# Patient Record
Sex: MALE | Race: WHITE | NOT HISPANIC OR LATINO
[De-identification: names, ages, dates, MRNs, and addresses within clinical notes are randomized per-mention and may not be internally consistent; named-entity substitution may affect disease eponyms.]

---

## 2017-04-03 PROBLEM — Z00.00 ENCOUNTER FOR PREVENTIVE HEALTH EXAMINATION: Status: ACTIVE | Noted: 2017-04-03

## 2022-02-09 ENCOUNTER — APPOINTMENT (OUTPATIENT)
Dept: COLORECTAL SURGERY | Facility: CLINIC | Age: 57
End: 2022-02-09
Payer: COMMERCIAL

## 2022-02-09 VITALS
HEART RATE: 58 BPM | WEIGHT: 240 LBS | BODY MASS INDEX: 33.6 KG/M2 | DIASTOLIC BLOOD PRESSURE: 80 MMHG | HEIGHT: 71 IN | TEMPERATURE: 98.1 F | SYSTOLIC BLOOD PRESSURE: 150 MMHG

## 2022-02-09 DIAGNOSIS — Z83.3 FAMILY HISTORY OF DIABETES MELLITUS: ICD-10-CM

## 2022-02-09 DIAGNOSIS — U07.1 COVID-19: ICD-10-CM

## 2022-02-09 DIAGNOSIS — Z72.89 OTHER PROBLEMS RELATED TO LIFESTYLE: ICD-10-CM

## 2022-02-09 DIAGNOSIS — Z87.891 PERSONAL HISTORY OF NICOTINE DEPENDENCE: ICD-10-CM

## 2022-02-09 DIAGNOSIS — Z86.79 PERSONAL HISTORY OF OTHER DISEASES OF THE CIRCULATORY SYSTEM: ICD-10-CM

## 2022-02-09 DIAGNOSIS — R73.03 PREDIABETES.: ICD-10-CM

## 2022-02-09 PROCEDURE — 99244 OFF/OP CNSLTJ NEW/EST MOD 40: CPT

## 2022-02-10 NOTE — PHYSICAL EXAM
[Normal Breath Sounds] : Normal breath sounds [Normal Heart Sounds] : normal heart sounds [Normal Rate and Rhythm] : normal rate and rhythm [No Edema] : No edema [Alert] : alert [Oriented to Person] : oriented to person [Oriented to Place] : oriented to place [Oriented to Time] : oriented to time [Anxious] : anxious [de-identified] : protruberant large ventral hernia Lt sided ostomy [de-identified] : NC/AT [de-identified] : +ROM [de-identified] : intact

## 2022-02-10 NOTE — REVIEW OF SYSTEMS
[As Noted in HPI] : as noted in HPI [Negative] : Endocrine [Chest Pain] : no chest pain [Shortness Of Breath] : no shortness of breath [Easy Bleeding] : no tendency for easy bleeding [Easy Bruising] : no tendency for easy bruising [FreeTextEntry8] : recent testicular sono

## 2022-02-10 NOTE — HISTORY OF PRESENT ILLNESS
[FreeTextEntry1] : 55yo WM with COVID in August 2021. Sept 6 went to [facility] ER with excruciating abdominal yana/distention.  CT scan revealed perforated colon. Underwent urgent Dev procedure. Hospitalized x1 weeks. Discharged with PICC line for IV ABX. Two days later VNS sent pt to clinic. Wound opened. Returned to [facility] x1 week. IV ABX. Wound VAC x2-3weeks.\par \par Presently ostomy functioning. Denies abdominal pain. Large ventral hernia. Presents for consultation re reversal of ostomy. Last colonoscopy 2015 (?diverticulosis)

## 2022-02-10 NOTE — ASSESSMENT
[FreeTextEntry1] : 56-year-old male who presents for consultation regarding Dev reversal.\par \par Patient had Covid in August of 2021. In early September of this same year he developed excruciating abdominal pain prompting evaluation at the emergency room of [facility]. CAT scan revealed perforated diverticulitis. He underwent an emergent Dev procedure. He was discharged home with a PICC line for an extended course of antibiotic. He was seen by a visiting nurse and there were concerns regarding his wound. He was seen at urgent care where the wound was opened. He eventually was readmitted to the hospital for additional antibiotics and eventual VAC placement. Slowly his wound healed. A large ventral hernia has developed. The patient has not had a colonoscopy in many years.\par \par We certainly can proceed with scheduling of the reversal of Dev. He will require a preoperative colonoscopy via the ostomy and anus. I will also have him seen by general surgery preoperatively so that we can combine the hernia repair with biomesh with the reversal of the Dev. We talked about anticipated operative time, hospital stay and time to complete recuperation. Risks, alternatives and benefits including but not limited to anastomotic leak, wound infection and deep venous thrombosis were discussed.\par \par Questions were answered and he wishes to proceed with the plan outlined above.

## 2022-02-17 ENCOUNTER — APPOINTMENT (OUTPATIENT)
Dept: SURGERY | Facility: CLINIC | Age: 57
End: 2022-02-17
Payer: COMMERCIAL

## 2022-02-17 VITALS
WEIGHT: 240 LBS | HEART RATE: 91 BPM | DIASTOLIC BLOOD PRESSURE: 87 MMHG | SYSTOLIC BLOOD PRESSURE: 142 MMHG | BODY MASS INDEX: 33.6 KG/M2 | HEIGHT: 71 IN | RESPIRATION RATE: 17 BRPM | OXYGEN SATURATION: 98 % | TEMPERATURE: 97.8 F

## 2022-02-17 DIAGNOSIS — Z78.9 OTHER SPECIFIED HEALTH STATUS: ICD-10-CM

## 2022-02-17 PROCEDURE — 99204 OFFICE O/P NEW MOD 45 MIN: CPT

## 2022-02-17 RX ORDER — METRONIDAZOLE 500 MG/1
500 TABLET ORAL
Qty: 3 | Refills: 0 | Status: DISCONTINUED | COMMUNITY
Start: 2022-02-09 | End: 2022-02-17

## 2022-02-17 RX ORDER — NEOMYCIN SULFATE 500 MG/1
500 TABLET ORAL
Qty: 3 | Refills: 0 | Status: DISCONTINUED | COMMUNITY
Start: 2022-02-09 | End: 2022-02-17

## 2022-02-20 ENCOUNTER — OUTPATIENT (OUTPATIENT)
Dept: OUTPATIENT SERVICES | Facility: HOSPITAL | Age: 57
LOS: 1 days | End: 2022-02-20
Payer: COMMERCIAL

## 2022-02-20 DIAGNOSIS — Z11.52 ENCOUNTER FOR SCREENING FOR COVID-19: ICD-10-CM

## 2022-02-20 LAB — SARS-COV-2 RNA SPEC QL NAA+PROBE: SIGNIFICANT CHANGE UP

## 2022-02-20 PROCEDURE — C9803: CPT

## 2022-02-20 PROCEDURE — U0005: CPT

## 2022-02-20 PROCEDURE — U0003: CPT

## 2022-02-22 ENCOUNTER — RESULT REVIEW (OUTPATIENT)
Age: 57
End: 2022-02-22

## 2022-02-22 ENCOUNTER — OUTPATIENT (OUTPATIENT)
Dept: OUTPATIENT SERVICES | Facility: HOSPITAL | Age: 57
LOS: 1 days | End: 2022-02-22
Payer: COMMERCIAL

## 2022-02-22 ENCOUNTER — APPOINTMENT (OUTPATIENT)
Dept: ULTRASOUND IMAGING | Facility: HOSPITAL | Age: 57
End: 2022-02-22
Payer: COMMERCIAL

## 2022-02-22 DIAGNOSIS — Z00.00 ENCOUNTER FOR GENERAL ADULT MEDICAL EXAMINATION WITHOUT ABNORMAL FINDINGS: ICD-10-CM

## 2022-02-22 PROCEDURE — 64647 CHEMODENERV TRUNK MUSC 6/>: CPT

## 2022-02-22 PROCEDURE — 76942 ECHO GUIDE FOR BIOPSY: CPT | Mod: 26

## 2022-02-22 PROCEDURE — 76942 ECHO GUIDE FOR BIOPSY: CPT

## 2022-02-22 PROCEDURE — 64646 CHEMODENERV TRUNK MUSC 1-5: CPT

## 2022-02-25 ENCOUNTER — OUTPATIENT (OUTPATIENT)
Dept: OUTPATIENT SERVICES | Facility: HOSPITAL | Age: 57
LOS: 1 days | End: 2022-02-25
Payer: COMMERCIAL

## 2022-02-25 VITALS
WEIGHT: 240.97 LBS | TEMPERATURE: 98 F | HEART RATE: 92 BPM | SYSTOLIC BLOOD PRESSURE: 130 MMHG | DIASTOLIC BLOOD PRESSURE: 85 MMHG | RESPIRATION RATE: 18 BRPM | HEIGHT: 71 IN | OXYGEN SATURATION: 96 %

## 2022-02-25 DIAGNOSIS — Z98.890 OTHER SPECIFIED POSTPROCEDURAL STATES: Chronic | ICD-10-CM

## 2022-02-25 DIAGNOSIS — G47.33 OBSTRUCTIVE SLEEP APNEA (ADULT) (PEDIATRIC): ICD-10-CM

## 2022-02-25 DIAGNOSIS — K57.33 DIVERTICULITIS OF LARGE INTESTINE WITHOUT PERFORATION OR ABSCESS WITH BLEEDING: ICD-10-CM

## 2022-02-25 DIAGNOSIS — Z29.9 ENCOUNTER FOR PROPHYLACTIC MEASURES, UNSPECIFIED: ICD-10-CM

## 2022-02-25 DIAGNOSIS — K43.2 INCISIONAL HERNIA WITHOUT OBSTRUCTION OR GANGRENE: ICD-10-CM

## 2022-02-25 DIAGNOSIS — K57.32 DIVERTICULITIS OF LARGE INTESTINE WITHOUT PERFORATION OR ABSCESS WITHOUT BLEEDING: ICD-10-CM

## 2022-02-25 DIAGNOSIS — Z01.818 ENCOUNTER FOR OTHER PREPROCEDURAL EXAMINATION: ICD-10-CM

## 2022-02-25 LAB
ANION GAP SERPL CALC-SCNC: 14 MMOL/L — SIGNIFICANT CHANGE UP (ref 5–17)
BLD GP AB SCN SERPL QL: NEGATIVE — SIGNIFICANT CHANGE UP
BUN SERPL-MCNC: 20 MG/DL — SIGNIFICANT CHANGE UP (ref 7–23)
CALCIUM SERPL-MCNC: 9.7 MG/DL — SIGNIFICANT CHANGE UP (ref 8.4–10.5)
CHLORIDE SERPL-SCNC: 100 MMOL/L — SIGNIFICANT CHANGE UP (ref 96–108)
CO2 SERPL-SCNC: 23 MMOL/L — SIGNIFICANT CHANGE UP (ref 22–31)
CREAT SERPL-MCNC: 0.71 MG/DL — SIGNIFICANT CHANGE UP (ref 0.5–1.3)
GLUCOSE SERPL-MCNC: 112 MG/DL — HIGH (ref 70–99)
HCT VFR BLD CALC: 44.3 % — SIGNIFICANT CHANGE UP (ref 39–50)
HGB BLD-MCNC: 14.5 G/DL — SIGNIFICANT CHANGE UP (ref 13–17)
MCHC RBC-ENTMCNC: 29.3 PG — SIGNIFICANT CHANGE UP (ref 27–34)
MCHC RBC-ENTMCNC: 32.7 GM/DL — SIGNIFICANT CHANGE UP (ref 32–36)
MCV RBC AUTO: 89.5 FL — SIGNIFICANT CHANGE UP (ref 80–100)
NRBC # BLD: 0 /100 WBCS — SIGNIFICANT CHANGE UP (ref 0–0)
PLATELET # BLD AUTO: 279 K/UL — SIGNIFICANT CHANGE UP (ref 150–400)
POTASSIUM SERPL-MCNC: 4.5 MMOL/L — SIGNIFICANT CHANGE UP (ref 3.5–5.3)
POTASSIUM SERPL-SCNC: 4.5 MMOL/L — SIGNIFICANT CHANGE UP (ref 3.5–5.3)
RBC # BLD: 4.95 M/UL — SIGNIFICANT CHANGE UP (ref 4.2–5.8)
RBC # FLD: 13.3 % — SIGNIFICANT CHANGE UP (ref 10.3–14.5)
RH IG SCN BLD-IMP: POSITIVE — SIGNIFICANT CHANGE UP
SODIUM SERPL-SCNC: 137 MMOL/L — SIGNIFICANT CHANGE UP (ref 135–145)
WBC # BLD: 6.64 K/UL — SIGNIFICANT CHANGE UP (ref 3.8–10.5)
WBC # FLD AUTO: 6.64 K/UL — SIGNIFICANT CHANGE UP (ref 3.8–10.5)

## 2022-02-25 PROCEDURE — 86900 BLOOD TYPING SEROLOGIC ABO: CPT

## 2022-02-25 PROCEDURE — 86901 BLOOD TYPING SEROLOGIC RH(D): CPT

## 2022-02-25 PROCEDURE — 85027 COMPLETE CBC AUTOMATED: CPT

## 2022-02-25 PROCEDURE — 87640 STAPH A DNA AMP PROBE: CPT

## 2022-02-25 PROCEDURE — 86850 RBC ANTIBODY SCREEN: CPT

## 2022-02-25 PROCEDURE — 87086 URINE CULTURE/COLONY COUNT: CPT

## 2022-02-25 PROCEDURE — 80048 BASIC METABOLIC PNL TOTAL CA: CPT

## 2022-02-25 PROCEDURE — G0463: CPT

## 2022-02-25 PROCEDURE — 83036 HEMOGLOBIN GLYCOSYLATED A1C: CPT

## 2022-02-25 PROCEDURE — 87641 MR-STAPH DNA AMP PROBE: CPT

## 2022-02-25 RX ORDER — CEFOTETAN DISODIUM 1 G
2 VIAL (EA) INJECTION ONCE
Refills: 0 | Status: DISCONTINUED | OUTPATIENT
Start: 2022-03-15 | End: 2022-03-16

## 2022-02-25 NOTE — H&P PST ADULT - NSICDXPASTMEDICALHX_GEN_ALL_CORE_FT
PAST MEDICAL HISTORY:  Abdominal wall hernia     Colostomy present     HTN (hypertension)     Obesity BMI-33    Perforated bowel

## 2022-02-25 NOTE — H&P PST ADULT - PROBLEM SELECTOR PLAN 1
ERP, Reversal Of Deleon Procedure ,Incisional Hernia Repair on 3/15/22  Pre- Op Instructions discussed   Labs sent  covid test 2  urine culture sent   medical eval due to- currently on sinus infection sinus infection

## 2022-02-25 NOTE — H&P PST ADULT - HISTORY OF PRESENT ILLNESS
56 year old male with PMH of HTN , Covid in August of 2021, Status post Dev procedure for perforated diverticulitis in 9/21@ [facility] / discharged home with an extended course of antibiotic and required wound VAC placement which was removed . Pt noted with abdominal bulge, c/o abdominal pain/discomfort noted with abdominal wall hernia . Pt was seen by Dr. Chi Campos and Dr Wagner -  for Dev reversal combined with incisional hernia repair on 3/15/22.    Covid test 3/12/22  Pt will stop phentermine 7 days prior to surgery     pt current on ABT and prednisone for ? sinus infection - requested medical eval prior to surgery

## 2022-02-25 NOTE — H&P PST ADULT - GASTROINTESTINAL COMMENTS
abdominal wall hernia abdominal surgical  wound / abdominal area reddened/ colostomy / pt wearing abdominal binder

## 2022-02-25 NOTE — H&P PST ADULT - NSANTHOSAYNRD_GEN_A_CORE
No. LUCITA screening performed.  STOP BANG Legend: 0-2 = LOW Risk; 3-4 = INTERMEDIATE Risk; 5-8 = HIGH Risk

## 2022-02-25 NOTE — H&P PST ADULT - ASSESSMENT
CAPRINI SCORE [CLOT updated 18]    AGE RELATED RISK FACTORS                                                       MOBILITY RELATED FACTORS  [x ] Age 41-60 years                                            (1 Point)                    [ ] Bed rest                                                        (1 Point)  [ ] Age: 61-74 years                                           (2 Points)                  [ ] Plaster cast                                                   (2 Points)  [ ] Age= 75 years                                              (3 Points)                    [ ] Bed bound for more than 72 hours                 (2 Points)    DISEASE RELATED RISK FACTORS                                               GENDER SPECIFIC FACTORS  [ ] Edema in the lower extremities                       (1 Point)              [ ] Pregnancy                                                     (1 Point)  [ ] Varicose veins                                               (1 Point)                     [ ] Post-partum < 6 weeks                                   (1 Point)             [x ] BMI > 25 Kg/m2                                            (1 Point)                     [ ] Hormonal therapy  or oral contraception          (1 Point)                 [ ] Sepsis (in the previous month)                        (1 Point)               [ ] History of pregnancy complications                 (1 point)  [ ] Pneumonia or serious lung disease                                               [ ] Unexplained or recurrent                     (1 Point)           (in the previous month)                               (1 Point)  [ ] Abnormal pulmonary function test                     (1 Point)                 SURGERY RELATED RISK FACTORS  [ ] Acute myocardial infarction                              (1 Point)               [ ]  Section                                             (1 Point)  [ ] Congestive heart failure (in the previous month)  (1 Point)      [ ] Minor surgery                                                  (1 Point)   [ ] Inflammatory bowel disease                             (1 Point)               [ ] Arthroscopic surgery                                        (2 Points)  [ ] Central venous access                                      (2 Points)                [x ] General surgery lasting more than 45 minutes (2 points)  [ ] Present or previous malignancy                     (2 Points)                [ ] Elective arthroplasty                                         (5 points)    [ ] Stroke (in the previous month)                          (5 Points)                                                                                                                                                           HEMATOLOGY RELATED FACTORS                                                 TRAUMA RELATED RISK FACTORS  [ ] Prior episodes of VTE                                     (3 Points)                [ ] Fracture of the hip, pelvis, or leg                       (5 Points)  [ ] Positive family history for VTE                         (3 Points)             [ ] Acute spinal cord injury (in the previous month)  (5 Points)  [ ] Prothrombin 60990 A                                     (3 Points)               [ ] Paralysis  (less than 1 month)                             (5 Points)  [ ] Factor V Leiden                                             (3 Points)                  [ ] Multiple Trauma within 1 month                        (5 Points)  [ ] Lupus anticoagulants                                     (3 Points)                                                           [ ] Anticardiolipin antibodies                               (3 Points)                                                       [ ] High homocysteine in the blood                      (3 Points)                                             [ ] Other congenital or acquired thrombophilia      (3 Points)                                                [ ] Heparin induced thrombocytopenia                  (3 Points)                                     Total Score [     4     ]

## 2022-02-26 LAB
A1C WITH ESTIMATED AVERAGE GLUCOSE RESULT: 7.5 % — HIGH (ref 4–5.6)
CULTURE RESULTS: SIGNIFICANT CHANGE UP
ESTIMATED AVERAGE GLUCOSE: 169 MG/DL — HIGH (ref 68–114)
MRSA PCR RESULT.: SIGNIFICANT CHANGE UP
S AUREUS DNA NOSE QL NAA+PROBE: DETECTED
SPECIMEN SOURCE: SIGNIFICANT CHANGE UP

## 2022-02-28 DIAGNOSIS — Z22.321 CARRIER OR SUSPECTED CARRIER OF METHICILLIN SUSCEPTIBLE STAPHYLOCOCCUS AUREUS: ICD-10-CM

## 2022-03-01 DIAGNOSIS — Z12.11 ENCOUNTER FOR SCREENING FOR MALIGNANT NEOPLASM OF COLON: ICD-10-CM

## 2022-03-02 NOTE — HISTORY OF PRESENT ILLNESS
[de-identified] : Ramesh is a 56 year old male here for consultation of abdominal bulge. Pt seen by Dr. Chi Campos 2/9/22- Status post Dev for perforated diverticulitis now ready for Dev reversal combined with incisional hernia repair. Patient had Covid in August of 2021. In early September of this same year he developed excruciating abdominal pain prompting evaluation at the emergency room of [facility]. CAT scan revealed perforated diverticulitis. He underwent an emergent Dev procedure. He was discharged home with a PICC line for an extended course of antibiotic. He was seen by a visiting nurse and there were concerns regarding his wound. He was seen at urgent care where the wound was opened. He eventually was readmitted to the hospital for additional antibiotics and eventual VAC placement. Slowly his wound healed. A large ventral hernia has developed. The patient has not had a colonoscopy in many years.  Today pt reports abdominal discomfort. Pt take Oxycodone for abdominal pain, drainage from abdominal wounds which he changes daily, mid abdominal bulge, wears abdominal binder. Reports ostomy functioning well, daily output. Good appetite, no nausea, no vomiting. Denies fever and chills.  The patient is scheduled for a Dev reversal.  At the time of the Dev reversal I have been asked to fix the incisional hernia.

## 2022-03-02 NOTE — CONSULT LETTER
[Dear  ___] : Dear  [unfilled], [Consult Letter:] : I had the pleasure of evaluating your patient, [unfilled]. [Please see my note below.] : Please see my note below. [Consult Closing:] : Thank you very much for allowing me to participate in the care of this patient.  If you have any questions, please do not hesitate to contact me. [Sincerely,] : Sincerely, [FreeTextEntry3] : I have reviewed all the documentation for this encounter with the patient and have edited where appropriate\par \par Dr. Mann Wagner

## 2022-03-02 NOTE — ASSESSMENT
[FreeTextEntry1] : I had a long and detailed discussion with the patient about the repair of this incisional hernia at the time of the Dev reversal which will be done by Dr. Chi Campos.  I have told the patient that I need to review his CT images and have asked him to get me a disc with those images that were done recently.\par \par I discussed with the patient the abdominal wall reconstruction that would be needed to fix this large incisional hernia.  I have told him that to give us the best chance of being able to fix this hernia without having to do any bridging will require him to have Botox injected 50 units in each muscle layer bilaterally in his abdominal wall.  As the patient is already set up for March 15 for his Dev reversal I am trying to expedite the Botox injection this week.\par \par I will discuss all this with Dr. Campos no once I see the CT images.

## 2022-03-02 NOTE — PHYSICAL EXAM
[Normal Breath Sounds] : Normal breath sounds [Normal Heart Sounds] : normal heart sounds [No Rash or Lesion] : No rash or lesion [Alert] : alert [Oriented to Person] : oriented to person [Oriented to Place] : oriented to place [Oriented to Time] : oriented to time [Calm] : calm [No HSM] : no hepatosplenomegaly [JVD] : no jugular venous distention  [Abdominal Masses] : No abdominal masses [Abdomen Tenderness] : ~T ~M No abdominal tenderness [de-identified] : WNL- cranial nerves 2-12 intact [de-identified] : WNL [de-identified] : There is a large incisional hernia with some pressure necrosis of the overlying skin. [de-identified] : ROM WNL

## 2022-03-07 ENCOUNTER — APPOINTMENT (OUTPATIENT)
Dept: UROLOGY | Facility: CLINIC | Age: 57
End: 2022-03-07
Payer: COMMERCIAL

## 2022-03-07 DIAGNOSIS — K63.1 PERFORATION OF INTESTINE (NONTRAUMATIC): ICD-10-CM

## 2022-03-07 PROBLEM — K43.9 VENTRAL HERNIA WITHOUT OBSTRUCTION OR GANGRENE: Chronic | Status: ACTIVE | Noted: 2022-02-25

## 2022-03-07 PROBLEM — I10 ESSENTIAL (PRIMARY) HYPERTENSION: Chronic | Status: ACTIVE | Noted: 2022-02-25

## 2022-03-07 PROCEDURE — 99442: CPT

## 2022-03-07 NOTE — HISTORY OF PRESENT ILLNESS
[FreeTextEntry1] : Will plan for cystoscopy ureteral catheters insertion bilaterally.\par \par We discussed ureteral catheter placement at the time of upcoming colorectal surgery. I explained the nature of the operation with catheters be placed cystoscopically. I have explained that this is a relatively short duration of procedure done just prior to the colon resection and that there may be associated gross hematuria after the procedure a temporary basis. Ureteral injury or edema or perforation of renal pelvis or calyces can occur, acute kidney injury can occur as well. I've also explained that catheters are not always able to be placed successfully. We also discussed the purpose of the catheters being to help assist with identification of ureters during the operation or to assist in identification of ureteral injury that may occur during the time of such an operation. If ureteral injury were to occur, I have explained that there were procedures to repair such injury that would be undertaken at that time, assuming that the injury was recognized immediately. Also, any encountered and recognized bladder injury would be repaired at the time. There may be a need for longer duration ureteral stenting if ureteral injury was noted and the extent of the injury suggested a need for ureteral stenting.\par

## 2022-03-12 ENCOUNTER — OUTPATIENT (OUTPATIENT)
Dept: OUTPATIENT SERVICES | Facility: HOSPITAL | Age: 57
LOS: 1 days | End: 2022-03-12
Payer: COMMERCIAL

## 2022-03-12 DIAGNOSIS — Z98.890 OTHER SPECIFIED POSTPROCEDURAL STATES: Chronic | ICD-10-CM

## 2022-03-12 DIAGNOSIS — Z11.52 ENCOUNTER FOR SCREENING FOR COVID-19: ICD-10-CM

## 2022-03-12 LAB — SARS-COV-2 RNA SPEC QL NAA+PROBE: SIGNIFICANT CHANGE UP

## 2022-03-12 PROCEDURE — U0005: CPT

## 2022-03-12 PROCEDURE — C9803: CPT

## 2022-03-12 PROCEDURE — U0003: CPT

## 2022-03-14 ENCOUNTER — APPOINTMENT (OUTPATIENT)
Dept: COLORECTAL SURGERY | Facility: CLINIC | Age: 57
End: 2022-03-14
Payer: COMMERCIAL

## 2022-03-14 ENCOUNTER — TRANSCRIPTION ENCOUNTER (OUTPATIENT)
Age: 57
End: 2022-03-14

## 2022-03-14 DIAGNOSIS — K57.30 DIVERTICULOSIS OF LARGE INTESTINE W/OUT PERFORATION OR ABSCESS W/OUT BLEEDING: ICD-10-CM

## 2022-03-14 PROCEDURE — 44388 COLONOSCOPY THRU STOMA SPX: CPT

## 2022-03-15 ENCOUNTER — INPATIENT (INPATIENT)
Facility: HOSPITAL | Age: 57
LOS: 7 days | Discharge: HOME | DRG: 329 | End: 2022-03-23
Attending: SURGERY | Admitting: SURGERY
Payer: COMMERCIAL

## 2022-03-15 ENCOUNTER — APPOINTMENT (OUTPATIENT)
Dept: SURGERY | Facility: HOSPITAL | Age: 57
End: 2022-03-15
Payer: COMMERCIAL

## 2022-03-15 ENCOUNTER — RESULT REVIEW (OUTPATIENT)
Age: 57
End: 2022-03-15

## 2022-03-15 ENCOUNTER — APPOINTMENT (OUTPATIENT)
Dept: COLORECTAL SURGERY | Facility: HOSPITAL | Age: 57
End: 2022-03-15
Payer: COMMERCIAL

## 2022-03-15 VITALS
TEMPERATURE: 98 F | RESPIRATION RATE: 18 BRPM | HEIGHT: 71 IN | DIASTOLIC BLOOD PRESSURE: 89 MMHG | HEART RATE: 107 BPM | SYSTOLIC BLOOD PRESSURE: 134 MMHG | WEIGHT: 240.97 LBS | OXYGEN SATURATION: 95 %

## 2022-03-15 DIAGNOSIS — K43.2 INCISIONAL HERNIA WITHOUT OBSTRUCTION OR GANGRENE: ICD-10-CM

## 2022-03-15 DIAGNOSIS — Z98.890 OTHER SPECIFIED POSTPROCEDURAL STATES: Chronic | ICD-10-CM

## 2022-03-15 DIAGNOSIS — K57.33 DIVERTICULITIS OF LARGE INTESTINE WITHOUT PERFORATION OR ABSCESS WITH BLEEDING: ICD-10-CM

## 2022-03-15 LAB
ANION GAP SERPL CALC-SCNC: 16 MMOL/L — SIGNIFICANT CHANGE UP (ref 5–17)
BASOPHILS # BLD AUTO: 0.02 K/UL — SIGNIFICANT CHANGE UP (ref 0–0.2)
BASOPHILS NFR BLD AUTO: 0.2 % — SIGNIFICANT CHANGE UP (ref 0–2)
BUN SERPL-MCNC: 17 MG/DL — SIGNIFICANT CHANGE UP (ref 7–23)
CALCIUM SERPL-MCNC: 8.3 MG/DL — LOW (ref 8.4–10.5)
CHLORIDE SERPL-SCNC: 106 MMOL/L — SIGNIFICANT CHANGE UP (ref 96–108)
CO2 SERPL-SCNC: 18 MMOL/L — LOW (ref 22–31)
CREAT SERPL-MCNC: 1.22 MG/DL — SIGNIFICANT CHANGE UP (ref 0.5–1.3)
EGFR: 69 ML/MIN/1.73M2 — SIGNIFICANT CHANGE UP
EOSINOPHIL # BLD AUTO: 0.01 K/UL — SIGNIFICANT CHANGE UP (ref 0–0.5)
EOSINOPHIL NFR BLD AUTO: 0.1 % — SIGNIFICANT CHANGE UP (ref 0–6)
GLUCOSE BLDC GLUCOMTR-MCNC: 160 MG/DL — HIGH (ref 70–99)
GLUCOSE BLDC GLUCOMTR-MCNC: 165 MG/DL — HIGH (ref 70–99)
GLUCOSE BLDC GLUCOMTR-MCNC: 237 MG/DL — HIGH (ref 70–99)
GLUCOSE SERPL-MCNC: 179 MG/DL — HIGH (ref 70–99)
HCT VFR BLD CALC: 42 % — SIGNIFICANT CHANGE UP (ref 39–50)
HGB BLD-MCNC: 13.7 G/DL — SIGNIFICANT CHANGE UP (ref 13–17)
IMM GRANULOCYTES NFR BLD AUTO: 0.4 % — SIGNIFICANT CHANGE UP (ref 0–1.5)
LYMPHOCYTES # BLD AUTO: 0.63 K/UL — LOW (ref 1–3.3)
LYMPHOCYTES # BLD AUTO: 4.8 % — LOW (ref 13–44)
MCHC RBC-ENTMCNC: 30.5 PG — SIGNIFICANT CHANGE UP (ref 27–34)
MCHC RBC-ENTMCNC: 32.6 GM/DL — SIGNIFICANT CHANGE UP (ref 32–36)
MCV RBC AUTO: 93.5 FL — SIGNIFICANT CHANGE UP (ref 80–100)
MONOCYTES # BLD AUTO: 1.06 K/UL — HIGH (ref 0–0.9)
MONOCYTES NFR BLD AUTO: 8 % — SIGNIFICANT CHANGE UP (ref 2–14)
NEUTROPHILS # BLD AUTO: 11.49 K/UL — HIGH (ref 1.8–7.4)
NEUTROPHILS NFR BLD AUTO: 86.5 % — HIGH (ref 43–77)
NRBC # BLD: 0 /100 WBCS — SIGNIFICANT CHANGE UP (ref 0–0)
PLATELET # BLD AUTO: 258 K/UL — SIGNIFICANT CHANGE UP (ref 150–400)
POTASSIUM SERPL-MCNC: 4.7 MMOL/L — SIGNIFICANT CHANGE UP (ref 3.5–5.3)
POTASSIUM SERPL-SCNC: 4.7 MMOL/L — SIGNIFICANT CHANGE UP (ref 3.5–5.3)
RBC # BLD: 4.49 M/UL — SIGNIFICANT CHANGE UP (ref 4.2–5.8)
RBC # FLD: 13.1 % — SIGNIFICANT CHANGE UP (ref 10.3–14.5)
SODIUM SERPL-SCNC: 140 MMOL/L — SIGNIFICANT CHANGE UP (ref 135–145)
WBC # BLD: 13.26 K/UL — HIGH (ref 3.8–10.5)
WBC # FLD AUTO: 13.26 K/UL — HIGH (ref 3.8–10.5)

## 2022-03-15 PROCEDURE — 15734 MUSCLE-SKIN GRAFT TRUNK: CPT | Mod: 82

## 2022-03-15 PROCEDURE — 15734 MUSCLE-SKIN GRAFT TRUNK: CPT | Mod: 59

## 2022-03-15 PROCEDURE — 49568: CPT

## 2022-03-15 PROCEDURE — 44620 REPAIR BOWEL OPENING: CPT | Mod: 59

## 2022-03-15 PROCEDURE — 74018 RADEX ABDOMEN 1 VIEW: CPT | Mod: 26

## 2022-03-15 PROCEDURE — 49568: CPT | Mod: 82

## 2022-03-15 PROCEDURE — 49560: CPT

## 2022-03-15 PROCEDURE — 15734 MUSCLE-SKIN GRAFT TRUNK: CPT

## 2022-03-15 PROCEDURE — 49560: CPT | Mod: 82

## 2022-03-15 PROCEDURE — 88307 TISSUE EXAM BY PATHOLOGIST: CPT | Mod: 26

## 2022-03-15 PROCEDURE — 15734 MUSCLE-SKIN GRAFT TRUNK: CPT | Mod: 82,59

## 2022-03-15 PROCEDURE — 45300 PROCTOSIGMOIDOSCOPY DX: CPT | Mod: 59

## 2022-03-15 PROCEDURE — 44145 PARTIAL REMOVAL OF COLON: CPT

## 2022-03-15 PROCEDURE — 52005 CYSTO W/URTRL CATHJ: CPT

## 2022-03-15 PROCEDURE — 88304 TISSUE EXAM BY PATHOLOGIST: CPT | Mod: 26

## 2022-03-15 DEVICE — STAPLER ETHICON PROXIMATE 75MM WITH BLUE RELOAD: Type: IMPLANTABLE DEVICE | Status: FUNCTIONAL

## 2022-03-15 DEVICE — IMPLANTABLE DEVICE: Type: IMPLANTABLE DEVICE | Status: FUNCTIONAL

## 2022-03-15 DEVICE — STAPLER ETHICON CIRCULAR XL 29MM: Type: IMPLANTABLE DEVICE | Status: FUNCTIONAL

## 2022-03-15 DEVICE — GUIDEWIRE SENSOR DUAL-FLEX NITINOL STRAIGHT .035" X 150CM: Type: IMPLANTABLE DEVICE | Status: FUNCTIONAL

## 2022-03-15 DEVICE — STAPLER CONTOUR CURVED WITH BLUE CART: Type: IMPLANTABLE DEVICE | Status: FUNCTIONAL

## 2022-03-15 DEVICE — STAPLER COVIDIEN PURSTRING 65MM: Type: IMPLANTABLE DEVICE | Status: FUNCTIONAL

## 2022-03-15 DEVICE — URETERAL CATH WHISTLE TIP 5FR LEFT: Type: IMPLANTABLE DEVICE | Status: FUNCTIONAL

## 2022-03-15 DEVICE — VISTASEAL FIBRIN HUMAN 4ML: Type: IMPLANTABLE DEVICE | Status: FUNCTIONAL

## 2022-03-15 DEVICE — URETERAL CATH OPEN END 5FR 70CM: Type: IMPLANTABLE DEVICE | Status: FUNCTIONAL

## 2022-03-15 RX ORDER — NALOXONE HYDROCHLORIDE 4 MG/.1ML
0.1 SPRAY NASAL
Refills: 0 | Status: DISCONTINUED | OUTPATIENT
Start: 2022-03-15 | End: 2022-03-23

## 2022-03-15 RX ORDER — HYDROMORPHONE HYDROCHLORIDE 2 MG/ML
0.5 INJECTION INTRAMUSCULAR; INTRAVENOUS; SUBCUTANEOUS
Refills: 0 | Status: DISCONTINUED | OUTPATIENT
Start: 2022-03-15 | End: 2022-03-16

## 2022-03-15 RX ORDER — INSULIN LISPRO 100/ML
VIAL (ML) SUBCUTANEOUS AT BEDTIME
Refills: 0 | Status: DISCONTINUED | OUTPATIENT
Start: 2022-03-15 | End: 2022-03-17

## 2022-03-15 RX ORDER — FENTANYL CITRATE 50 UG/ML
25 INJECTION INTRAVENOUS
Refills: 0 | Status: DISCONTINUED | OUTPATIENT
Start: 2022-03-15 | End: 2022-03-16

## 2022-03-15 RX ORDER — ONDANSETRON 8 MG/1
4 TABLET, FILM COATED ORAL ONCE
Refills: 0 | Status: DISCONTINUED | OUTPATIENT
Start: 2022-03-15 | End: 2022-03-16

## 2022-03-15 RX ORDER — ACETAMINOPHEN 500 MG
1000 TABLET ORAL ONCE
Refills: 0 | Status: COMPLETED | OUTPATIENT
Start: 2022-03-16 | End: 2022-03-16

## 2022-03-15 RX ORDER — HYDROMORPHONE HYDROCHLORIDE 2 MG/ML
0.5 INJECTION INTRAMUSCULAR; INTRAVENOUS; SUBCUTANEOUS ONCE
Refills: 0 | Status: DISCONTINUED | OUTPATIENT
Start: 2022-03-15 | End: 2022-03-16

## 2022-03-15 RX ORDER — PHENYLEPHRINE HYDROCHLORIDE 10 MG/ML
0.5 INJECTION INTRAVENOUS
Qty: 40 | Refills: 0 | Status: DISCONTINUED | OUTPATIENT
Start: 2022-03-15 | End: 2022-03-16

## 2022-03-15 RX ORDER — GLUCAGON INJECTION, SOLUTION 0.5 MG/.1ML
1 INJECTION, SOLUTION SUBCUTANEOUS ONCE
Refills: 0 | Status: DISCONTINUED | OUTPATIENT
Start: 2022-03-15 | End: 2022-03-23

## 2022-03-15 RX ORDER — ACETAMINOPHEN 500 MG
1000 TABLET ORAL ONCE
Refills: 0 | Status: COMPLETED | OUTPATIENT
Start: 2022-03-15 | End: 2022-03-16

## 2022-03-15 RX ORDER — ACETAMINOPHEN 500 MG
1000 TABLET ORAL ONCE
Refills: 0 | Status: DISCONTINUED | OUTPATIENT
Start: 2022-03-15 | End: 2022-03-16

## 2022-03-15 RX ORDER — LIDOCAINE HCL 20 MG/ML
0.2 VIAL (ML) INJECTION ONCE
Refills: 0 | Status: DISCONTINUED | OUTPATIENT
Start: 2022-03-15 | End: 2022-03-15

## 2022-03-15 RX ORDER — BUPIVACAINE HCL/EPINEPHRINE/PF 0.5-1:200K
10 VIAL (ML) INJECTION ONCE
Refills: 0 | Status: DISCONTINUED | OUTPATIENT
Start: 2022-03-15 | End: 2022-03-21

## 2022-03-15 RX ORDER — ONDANSETRON 8 MG/1
4 TABLET, FILM COATED ORAL EVERY 6 HOURS
Refills: 0 | Status: DISCONTINUED | OUTPATIENT
Start: 2022-03-15 | End: 2022-03-23

## 2022-03-15 RX ORDER — CHLORHEXIDINE GLUCONATE 213 G/1000ML
1 SOLUTION TOPICAL DAILY
Refills: 0 | Status: DISCONTINUED | OUTPATIENT
Start: 2022-03-15 | End: 2022-03-15

## 2022-03-15 RX ORDER — SODIUM CHLORIDE 9 MG/ML
1000 INJECTION, SOLUTION INTRAVENOUS
Refills: 0 | Status: DISCONTINUED | OUTPATIENT
Start: 2022-03-15 | End: 2022-03-17

## 2022-03-15 RX ORDER — MORPHINE SULFATE 50 MG/1
2 CAPSULE, EXTENDED RELEASE ORAL ONCE
Refills: 0 | Status: DISCONTINUED | OUTPATIENT
Start: 2022-03-15 | End: 2022-03-16

## 2022-03-15 RX ORDER — HEPARIN SODIUM 5000 [USP'U]/ML
5000 INJECTION INTRAVENOUS; SUBCUTANEOUS EVERY 8 HOURS
Refills: 0 | Status: DISCONTINUED | OUTPATIENT
Start: 2022-03-15 | End: 2022-03-23

## 2022-03-15 RX ORDER — INSULIN LISPRO 100/ML
VIAL (ML) SUBCUTANEOUS
Refills: 0 | Status: DISCONTINUED | OUTPATIENT
Start: 2022-03-15 | End: 2022-03-16

## 2022-03-15 RX ORDER — DEXTROSE 50 % IN WATER 50 %
25 SYRINGE (ML) INTRAVENOUS ONCE
Refills: 0 | Status: DISCONTINUED | OUTPATIENT
Start: 2022-03-15 | End: 2022-03-17

## 2022-03-15 RX ORDER — GABAPENTIN 400 MG/1
600 CAPSULE ORAL ONCE
Refills: 0 | Status: COMPLETED | OUTPATIENT
Start: 2022-03-15 | End: 2022-03-15

## 2022-03-15 RX ORDER — HYDROMORPHONE HYDROCHLORIDE 2 MG/ML
0.25 INJECTION INTRAMUSCULAR; INTRAVENOUS; SUBCUTANEOUS ONCE
Refills: 0 | Status: DISCONTINUED | OUTPATIENT
Start: 2022-03-15 | End: 2022-03-15

## 2022-03-15 RX ORDER — FENTANYL CITRATE 50 UG/ML
100 INJECTION INTRAVENOUS ONCE
Refills: 0 | Status: DISCONTINUED | OUTPATIENT
Start: 2022-03-15 | End: 2022-03-17

## 2022-03-15 RX ORDER — CELECOXIB 200 MG/1
400 CAPSULE ORAL ONCE
Refills: 0 | Status: COMPLETED | OUTPATIENT
Start: 2022-03-15 | End: 2022-03-15

## 2022-03-15 RX ORDER — DEXTROSE 50 % IN WATER 50 %
15 SYRINGE (ML) INTRAVENOUS ONCE
Refills: 0 | Status: DISCONTINUED | OUTPATIENT
Start: 2022-03-15 | End: 2022-03-23

## 2022-03-15 RX ORDER — DEXTROSE 50 % IN WATER 50 %
12.5 SYRINGE (ML) INTRAVENOUS ONCE
Refills: 0 | Status: DISCONTINUED | OUTPATIENT
Start: 2022-03-15 | End: 2022-03-17

## 2022-03-15 RX ORDER — SODIUM CHLORIDE 9 MG/ML
3 INJECTION INTRAMUSCULAR; INTRAVENOUS; SUBCUTANEOUS EVERY 8 HOURS
Refills: 0 | Status: DISCONTINUED | OUTPATIENT
Start: 2022-03-15 | End: 2022-03-15

## 2022-03-15 RX ADMIN — HYDROMORPHONE HYDROCHLORIDE 0.25 MILLIGRAM(S): 2 INJECTION INTRAMUSCULAR; INTRAVENOUS; SUBCUTANEOUS at 19:15

## 2022-03-15 RX ADMIN — GABAPENTIN 600 MILLIGRAM(S): 400 CAPSULE ORAL at 11:59

## 2022-03-15 RX ADMIN — HYDROMORPHONE HYDROCHLORIDE 0.25 MILLIGRAM(S): 2 INJECTION INTRAMUSCULAR; INTRAVENOUS; SUBCUTANEOUS at 19:05

## 2022-03-15 RX ADMIN — SODIUM CHLORIDE 125 MILLILITER(S): 9 INJECTION, SOLUTION INTRAVENOUS at 21:20

## 2022-03-15 RX ADMIN — HYDROMORPHONE HYDROCHLORIDE 0.5 MILLIGRAM(S): 2 INJECTION INTRAMUSCULAR; INTRAVENOUS; SUBCUTANEOUS at 19:33

## 2022-03-15 RX ADMIN — PHENYLEPHRINE HYDROCHLORIDE 20.5 MICROGRAM(S)/KG/MIN: 10 INJECTION INTRAVENOUS at 21:21

## 2022-03-15 RX ADMIN — HEPARIN SODIUM 5000 UNIT(S): 5000 INJECTION INTRAVENOUS; SUBCUTANEOUS at 21:35

## 2022-03-15 RX ADMIN — CELECOXIB 400 MILLIGRAM(S): 200 CAPSULE ORAL at 11:59

## 2022-03-15 RX ADMIN — HYDROMORPHONE HYDROCHLORIDE 0.5 MILLIGRAM(S): 2 INJECTION INTRAMUSCULAR; INTRAVENOUS; SUBCUTANEOUS at 19:45

## 2022-03-15 NOTE — PATIENT PROFILE ADULT - FALL HARM RISK - HARM RISK INTERVENTIONS

## 2022-03-15 NOTE — PATIENT PROFILE ADULT - PATIENT REPRESENTATIVE: ( YOU CAN CHOOSE ANY PERSON THAT CAN ASSIST YOU WITH YOUR HEALTH CARE PREFERENCES, DOES NOT HAVE TO BE A SPOUSE, IMMEDIATE FAMILY OR SIGNIFICANT OTHER/PARTNER)
[General Appearance - Alert] : alert [General Appearance - In No Acute Distress] : in no acute distress [Neck Appearance] : the appearance of the neck was normal [Neck Cervical Mass (___cm)] : no neck mass was observed [Jugular Venous Distention Increased] : there was no jugular-venous distention [Thyroid Diffuse Enlargement] : the thyroid was not enlarged [Thyroid Nodule] : there were no palpable thyroid nodules [Auscultation Breath Sounds / Voice Sounds] : lungs were clear to auscultation bilaterally [Heart Rate And Rhythm] : heart rate was normal and rhythm regular [Heart Sounds] : normal S1 and S2 [Heart Sounds Gallop] : no gallops [Murmurs] : no murmurs [Heart Sounds Pericardial Friction Rub] : no pericardial rub [Bowel Sounds] : normal bowel sounds [Abdomen Soft] : soft [] : no hepato-splenomegaly [Abdomen Tenderness] : non-tender [Abdomen Mass (___ Cm)] : no abdominal mass palpated [FreeTextEntry1] : mildly tender on left [Abnormal Walk] : normal gait [Nail Clubbing] : no clubbing  or cyanosis of the fingernails [Musculoskeletal - Swelling] : no joint swelling seen [Motor Tone] : muscle strength and tone were normal [Oriented To Time, Place, And Person] : oriented to person, place, and time [Impaired Insight] : insight and judgment were intact [Affect] : the affect was normal same name as above

## 2022-03-15 NOTE — PATIENT PROFILE ADULT - HOME ACCESSIBILITY CONCERNS
"Patient: Tawny Shin    Procedure Summary     Date: 03/03/21 Room / Location:  [facility]  /  [facility]    Anesthesia Start: 1333 Anesthesia Stop: 1843    Procedure: CERVICAL 6 CORPECTOMY WITH TITANIUM CAGE WITH NEURO MONITORING (N/A Spine Cervical) Diagnosis:       Stenosis of cervical spine with myelopathy (CMS/HCC)      Spinal stenosis, lumbar region, with neurogenic claudication      Discitis of lumbar region      Osteomyelitis of lumbar spine (CMS/HCC)      (Stenosis of cervical spine with myelopathy (CMS/HCC) [M48.02, G99.2])      (Spinal stenosis, lumbar region, with neurogenic claudication [M48.062])      (Discitis of lumbar region [M46.46])      (Osteomyelitis of lumbar spine (CMS/HCC) [M46.26])    Surgeon: Bandar Shea MD Provider: Nino Samayoa CRNA    Anesthesia Type: general ASA Status: 3 - Emergent          Anesthesia Type: general    Vitals  Vitals Value Taken Time   /73 03/03/21 1955   Temp 98.4 °F (36.9 °C) 03/03/21 2000   Pulse 80 03/03/21 2000   Resp 20 03/03/21 2000   SpO2 94 % 03/03/21 2000           Post Anesthesia Care and Evaluation    Patient location during evaluation: PACU  Patient participation: complete - patient participated  Level of consciousness: awake and alert  Pain management: adequate  Airway patency: patent  Anesthetic complications: No anesthetic complications    Cardiovascular status: acceptable  Respiratory status: acceptable  Hydration status: acceptable    Comments: Blood pressure 135/69, pulse 67, temperature 98.3 °F (36.8 °C), temperature source Axillary, resp. rate 20, height 165.1 cm (65\"), weight 98.2 kg (216 lb 7.9 oz), SpO2 98 %, not currently breastfeeding.    Pt discharged from PACU based on tyler score >8      "
none

## 2022-03-15 NOTE — CHART NOTE - NSCHARTNOTEFT_GEN_A_CORE
Surgery Post-Op Note    Pre-Op Dx: diverticulitis   Procedure: Reversal, Dev procedure; Rigid proctosigmoidoscopy; repair of midline abdominal wall defect     SUBJECTIVE:  Pt seen and examined at the bedside. Initially upon PACU entry, patient was in severe abdominal pain (10/10). Anesthesia gave 10ml of 0.25% bupivacaine with epinephrine at the level of L2-L3. Pain improved however patient now requiring pressors support to counteract the epidural. Denies F/C/N/V. No chest pain or shortness of breath    OBJECTIVE:  Vital Signs Last 24 Hrs  T(C): 36.1 (15 Mar 2022 22:00), Max: 36.9 (15 Mar 2022 11:35)  T(F): 97 (15 Mar 2022 22:00), Max: 98.4 (15 Mar 2022 11:35)  HR: 87 (15 Mar 2022 23:00) (75 - 107)  BP: 102/57 (15 Mar 2022 23:00) (77/50 - 134/89)  BP(mean): 74 (15 Mar 2022 23:00) (59 - 84)  RR: 16 (15 Mar 2022 23:00) (15 - 18)  SpO2: 99% (15 Mar 2022 23:00) (95% - 100%)    Physical Exam:  General: NAD, resting comfortably in bed  Neuro: A/O x 3, no focal deficits  Pulmonary: Nonlabored breathing, no respiratory distress  Cardiovascular: NSR  Abdominal: soft, NTTP, ND, wound vac in place. No herniation or defect noted on abdominal wall.  Incision: C/D/I   Drains: Tello in place with left U-cath  Extremities: [facility]    LABS:                        13.7   13.26 )-----------( 258      ( 15 Mar 2022 19:26 )             42.0     03-15    140  |  106  |  17  ----------------------------<  179<H>  4.7   |  18<L>  |  1.22    Ca    8.3<L>      15 Mar 2022 19:26        CAPILLARY BLOOD GLUCOSE      POCT Blood Glucose.: 165 mg/dL (15 Mar 2022 21:30)  POCT Blood Glucose.: 160 mg/dL (15 Mar 2022 19:29)  POCT Blood Glucose.: 237 mg/dL (15 Mar 2022 11:24)        ABO Interpretation: O (03-15 @ 11:37)      IMAGING:    ASSESSMENT:56y Male now 4hours s/p     PLAN:  - Pain control  - Encourage IS  - Nausea control PRN  - Monitor vitals  - Diet: IVF  - Monitor I+Os  - OOB/ Ambulate  - DVT ppx:      Team Surgery Surgery Post-Op Note    Pre-Op Dx: diverticulitis   Procedure: Reversal, Dev procedure; Rigid proctosigmoidoscopy; repair of midline abdominal wall defect     SUBJECTIVE:  Pt seen and examined at the bedside. Initially upon PACU entry, patient was in severe abdominal pain (10/10). Anesthesia gave 10ml of 0.25% bupivacaine with epinephrine at the level of L2-L3. Pain improved however patient now requiring pressors support (0.5 ben) to counteract the epidural. Denies F/C/N/V. No chest pain or shortness of breath. Has difficulty raising his left leg. Anesthesia explained to patient that this is an epidural effect.     OBJECTIVE:  Vital Signs Last 24 Hrs  T(C): 36.1 (15 Mar 2022 22:00), Max: 36.9 (15 Mar 2022 11:35)  T(F): 97 (15 Mar 2022 22:00), Max: 98.4 (15 Mar 2022 11:35)  HR: 87 (15 Mar 2022 23:00) (75 - 107)  BP: 102/57 (15 Mar 2022 23:00) (77/50 - 134/89)  BP(mean): 74 (15 Mar 2022 23:00) (59 - 84)  RR: 16 (15 Mar 2022 23:00) (15 - 18)  SpO2: 99% (15 Mar 2022 23:00) (95% - 100%)    Physical Exam:  General: NAD, resting comfortably in bed  Neuro: A/O x 3, no focal deficits  Pulmonary: Nonlabored breathing, no respiratory distress  Cardiovascular: NSR  Abdominal: soft, NTTP, ND, wound vac in place. No herniation or defect noted on abdominal wall.  Incision: C/D/I   Drains: Tello in place with left U-cath  Extremities: [facility]    LABS:                        13.7   13.26 )-----------( 258      ( 15 Mar 2022 19:26 )             42.0     03-15    140  |  106  |  17  ----------------------------<  179<H>  4.7   |  18<L>  |  1.22    Ca    8.3<L>      15 Mar 2022 19:26        CAPILLARY BLOOD GLUCOSE      POCT Blood Glucose.: 165 mg/dL (15 Mar 2022 21:30)  POCT Blood Glucose.: 160 mg/dL (15 Mar 2022 19:29)  POCT Blood Glucose.: 237 mg/dL (15 Mar 2022 11:24)        ABO Interpretation: O (03-15 @ 11:37)      IMAGING:    ASSESSMENT:56y Male now 4hours s/p Dev reveral and repair of midline abdominal wall defect. Patient requiring pressor support secondary to administration of epidural bupivacaine. Pain now controlled and being weaned off ben.     PLAN:  - Pain control  - Encourage IS  - Nausea control PRN  - Monitor vitals  - Tello with left u-cath  - Diet: IVF + NPO; may consider sips tomorrow   - Monitor I+Os  - OOB/ Ambulate  - DVT ppx     Team Surgery

## 2022-03-15 NOTE — CHART NOTE - NSCHARTNOTEFT_GEN_A_CORE
Called to bedside @ 1900, Pt c/o severe pain 10 on 0 to 10 scale.. Unable to determine Epidural level at this time. Given Dilaudid 0.25 @ 1903 with minimal relief. ordered additional 0.5 mg of Dilaudid Called to an emergency at another bedside , Called Dr Webb, who addressed pain by giving 10ml of 0.25% bupivacaine with epinephrine. Catheter noted to be @ L2-3.pateints pain now a 2 on o0 to 10 scale.   Patient required 100 mcg of Phenylephrine and subsequent phenylephrine drip @ 0.5 mcg/mg/kg as Pcea started. as SBP 70's. This is an expected outcome of bolusing a working epidural. D/w Dr Webb. Called to bedside @ 1900, Pt c/o severe pain 10 on 0 to 10 scale.. Unable to determine Epidural level at this time. Given Dilaudid 0.25 @ 1903 with minimal relief. ordered additional 0.5 mg of Dilaudid Called to an emergency at another bedside , Called Dr Webb, who addressed pain by giving 10ml of 0.25% bupivacaine with epinephrine. Catheter noted to be @ L2-3.pateints pain now a 2 on 0 to 10 scale.   Patient required 100 mcg of Phenylephrine and subsequent phenylephrine drip @ 0.5 mcg/mg/kg as SBP  drop[ed breifly to 70's. This is an expected outcome of bolusing a working epidural.  Currently  D/w Dr Webb.

## 2022-03-15 NOTE — PATIENT PROFILE ADULT - FALL HARM RISK - UNIVERSAL INTERVENTIONS
Bed in lowest position, wheels locked, appropriate side rails in place/Call bell, personal items and telephone in reach/Instruct patient to call for assistance before getting out of bed or chair/Non-slip footwear when patient is out of bed/[place] to call system/Physically safe environment - no spills, clutter or unnecessary equipment/Purposeful Proactive Rounding/Room/bathroom lighting operational, light cord in reach

## 2022-03-15 NOTE — PATIENT PROFILE ADULT - NSPROSPHOSPCHAPLAINYN_GEN_A_NUR
I have reviewed the notes, assessments, and/or procedures performed by Dr. Rodríguez, I concur with her/his documentation and assessment and plan for Cristo Musa.       This document has been electronically signed by César Haider MD on February 25, 2019 4:41 PM      no

## 2022-03-16 LAB
ANION GAP SERPL CALC-SCNC: 11 MMOL/L — SIGNIFICANT CHANGE UP (ref 5–17)
APTT BLD: 26.2 SEC — LOW (ref 27.5–35.5)
BASOPHILS # BLD AUTO: 0.01 K/UL — SIGNIFICANT CHANGE UP (ref 0–0.2)
BASOPHILS NFR BLD AUTO: 0.1 % — SIGNIFICANT CHANGE UP (ref 0–2)
BUN SERPL-MCNC: 20 MG/DL — SIGNIFICANT CHANGE UP (ref 7–23)
CALCIUM SERPL-MCNC: 7.9 MG/DL — LOW (ref 8.4–10.5)
CHLORIDE SERPL-SCNC: 109 MMOL/L — HIGH (ref 96–108)
CO2 SERPL-SCNC: 21 MMOL/L — LOW (ref 22–31)
CREAT SERPL-MCNC: 1.18 MG/DL — SIGNIFICANT CHANGE UP (ref 0.5–1.3)
EGFR: 72 ML/MIN/1.73M2 — SIGNIFICANT CHANGE UP
EOSINOPHIL # BLD AUTO: 0 K/UL — SIGNIFICANT CHANGE UP (ref 0–0.5)
EOSINOPHIL NFR BLD AUTO: 0 % — SIGNIFICANT CHANGE UP (ref 0–6)
GLUCOSE BLDC GLUCOMTR-MCNC: 135 MG/DL — HIGH (ref 70–99)
GLUCOSE BLDC GLUCOMTR-MCNC: 141 MG/DL — HIGH (ref 70–99)
GLUCOSE BLDC GLUCOMTR-MCNC: 151 MG/DL — HIGH (ref 70–99)
GLUCOSE BLDC GLUCOMTR-MCNC: 158 MG/DL — HIGH (ref 70–99)
GLUCOSE BLDC GLUCOMTR-MCNC: 181 MG/DL — HIGH (ref 70–99)
GLUCOSE SERPL-MCNC: 151 MG/DL — HIGH (ref 70–99)
HCT VFR BLD CALC: 36.4 % — LOW (ref 39–50)
HGB BLD-MCNC: 11.6 G/DL — LOW (ref 13–17)
IMM GRANULOCYTES NFR BLD AUTO: 0.3 % — SIGNIFICANT CHANGE UP (ref 0–1.5)
INR BLD: 0.95 RATIO — SIGNIFICANT CHANGE UP (ref 0.88–1.16)
LACTATE BLDV-MCNC: 1.7 MMOL/L — SIGNIFICANT CHANGE UP (ref 0.7–2)
LYMPHOCYTES # BLD AUTO: 0.84 K/UL — LOW (ref 1–3.3)
LYMPHOCYTES # BLD AUTO: 9.5 % — LOW (ref 13–44)
MAGNESIUM SERPL-MCNC: 2.1 MG/DL — SIGNIFICANT CHANGE UP (ref 1.6–2.6)
MCHC RBC-ENTMCNC: 29.7 PG — SIGNIFICANT CHANGE UP (ref 27–34)
MCHC RBC-ENTMCNC: 31.9 GM/DL — LOW (ref 32–36)
MCV RBC AUTO: 93.3 FL — SIGNIFICANT CHANGE UP (ref 80–100)
MONOCYTES # BLD AUTO: 0.78 K/UL — SIGNIFICANT CHANGE UP (ref 0–0.9)
MONOCYTES NFR BLD AUTO: 8.8 % — SIGNIFICANT CHANGE UP (ref 2–14)
NEUTROPHILS # BLD AUTO: 7.22 K/UL — SIGNIFICANT CHANGE UP (ref 1.8–7.4)
NEUTROPHILS NFR BLD AUTO: 81.3 % — HIGH (ref 43–77)
NRBC # BLD: 0 /100 WBCS — SIGNIFICANT CHANGE UP (ref 0–0)
PHOSPHATE SERPL-MCNC: 5 MG/DL — HIGH (ref 2.5–4.5)
PLATELET # BLD AUTO: 231 K/UL — SIGNIFICANT CHANGE UP (ref 150–400)
POTASSIUM SERPL-MCNC: 5 MMOL/L — SIGNIFICANT CHANGE UP (ref 3.5–5.3)
POTASSIUM SERPL-SCNC: 5 MMOL/L — SIGNIFICANT CHANGE UP (ref 3.5–5.3)
PROTHROM AB SERPL-ACNC: 10.9 SEC — SIGNIFICANT CHANGE UP (ref 10.5–13.4)
RBC # BLD: 3.9 M/UL — LOW (ref 4.2–5.8)
RBC # FLD: 13.5 % — SIGNIFICANT CHANGE UP (ref 10.3–14.5)
SODIUM SERPL-SCNC: 141 MMOL/L — SIGNIFICANT CHANGE UP (ref 135–145)
WBC # BLD: 8.88 K/UL — SIGNIFICANT CHANGE UP (ref 3.8–10.5)
WBC # FLD AUTO: 8.88 K/UL — SIGNIFICANT CHANGE UP (ref 3.8–10.5)

## 2022-03-16 RX ORDER — HYDROMORPHONE HYDROCHLORIDE 2 MG/ML
0.5 INJECTION INTRAMUSCULAR; INTRAVENOUS; SUBCUTANEOUS
Refills: 0 | Status: DISCONTINUED | OUTPATIENT
Start: 2022-03-16 | End: 2022-03-21

## 2022-03-16 RX ORDER — HYDROMORPHONE HYDROCHLORIDE 2 MG/ML
30 INJECTION INTRAMUSCULAR; INTRAVENOUS; SUBCUTANEOUS
Refills: 0 | Status: DISCONTINUED | OUTPATIENT
Start: 2022-03-16 | End: 2022-03-17

## 2022-03-16 RX ORDER — DEXTROSE 50 % IN WATER 50 %
12.5 SYRINGE (ML) INTRAVENOUS ONCE
Refills: 0 | Status: DISCONTINUED | OUTPATIENT
Start: 2022-03-16 | End: 2022-03-17

## 2022-03-16 RX ORDER — DEXTROSE 50 % IN WATER 50 %
15 SYRINGE (ML) INTRAVENOUS ONCE
Refills: 0 | Status: DISCONTINUED | OUTPATIENT
Start: 2022-03-16 | End: 2022-03-17

## 2022-03-16 RX ORDER — SODIUM CHLORIDE 9 MG/ML
1000 INJECTION, SOLUTION INTRAVENOUS
Refills: 0 | Status: DISCONTINUED | OUTPATIENT
Start: 2022-03-16 | End: 2022-03-17

## 2022-03-16 RX ORDER — DEXTROSE 50 % IN WATER 50 %
25 SYRINGE (ML) INTRAVENOUS ONCE
Refills: 0 | Status: DISCONTINUED | OUTPATIENT
Start: 2022-03-16 | End: 2022-03-17

## 2022-03-16 RX ORDER — GLUCAGON INJECTION, SOLUTION 0.5 MG/.1ML
1 INJECTION, SOLUTION SUBCUTANEOUS ONCE
Refills: 0 | Status: DISCONTINUED | OUTPATIENT
Start: 2022-03-16 | End: 2022-03-21

## 2022-03-16 RX ORDER — SIMETHICONE 80 MG/1
80 TABLET, CHEWABLE ORAL ONCE
Refills: 0 | Status: COMPLETED | OUTPATIENT
Start: 2022-03-16 | End: 2022-03-16

## 2022-03-16 RX ORDER — INSULIN LISPRO 100/ML
VIAL (ML) SUBCUTANEOUS EVERY 6 HOURS
Refills: 0 | Status: DISCONTINUED | OUTPATIENT
Start: 2022-03-16 | End: 2022-03-21

## 2022-03-16 RX ADMIN — Medication 1000 MILLIGRAM(S): at 06:15

## 2022-03-16 RX ADMIN — HEPARIN SODIUM 5000 UNIT(S): 5000 INJECTION INTRAVENOUS; SUBCUTANEOUS at 15:46

## 2022-03-16 RX ADMIN — Medication 400 MILLIGRAM(S): at 01:40

## 2022-03-16 RX ADMIN — Medication 400 MILLIGRAM(S): at 13:38

## 2022-03-16 RX ADMIN — Medication 400 MILLIGRAM(S): at 06:14

## 2022-03-16 RX ADMIN — SODIUM CHLORIDE 125 MILLILITER(S): 9 INJECTION, SOLUTION INTRAVENOUS at 13:36

## 2022-03-16 RX ADMIN — HYDROMORPHONE HYDROCHLORIDE 30 MILLILITER(S): 2 INJECTION INTRAMUSCULAR; INTRAVENOUS; SUBCUTANEOUS at 19:20

## 2022-03-16 RX ADMIN — Medication 1000 MILLIGRAM(S): at 01:55

## 2022-03-16 RX ADMIN — HYDROMORPHONE HYDROCHLORIDE 30 MILLILITER(S): 2 INJECTION INTRAMUSCULAR; INTRAVENOUS; SUBCUTANEOUS at 07:36

## 2022-03-16 RX ADMIN — HEPARIN SODIUM 5000 UNIT(S): 5000 INJECTION INTRAVENOUS; SUBCUTANEOUS at 21:33

## 2022-03-16 RX ADMIN — Medication 1: at 17:46

## 2022-03-16 RX ADMIN — HEPARIN SODIUM 5000 UNIT(S): 5000 INJECTION INTRAVENOUS; SUBCUTANEOUS at 06:12

## 2022-03-16 RX ADMIN — SIMETHICONE 80 MILLIGRAM(S): 80 TABLET, CHEWABLE ORAL at 21:33

## 2022-03-16 NOTE — PROGRESS NOTE ADULT - SUBJECTIVE AND OBJECTIVE BOX
SURGERY DAILY PROGRESS NOTE:       SUBJECTIVE/ROS: Patient is s/p Dev reversal and repair of midline abdominal wall defect. Had an extended PACU stay due to needing pressors secondary to spinal epidural bupivacaine. He has been complaining of left lower extremity weakness after receiving the spinal epidural Anesthesia saw the patient and will reevaluate patient in AM.    Patient seen and examined at bedside during morning rounds. Still c/o LLE weakness and inability to extend knee.    OBJECTIVE:    Vital Signs Last 24 Hrs  T(C): 36.8 (16 Mar 2022 05:45), Max: 36.9 (15 Mar 2022 11:35)  T(F): 98.2 (16 Mar 2022 05:45), Max: 98.4 (15 Mar 2022 11:35)  HR: 87 (16 Mar 2022 05:45) (75 - 107)  BP: 94/58 (16 Mar 2022 05:45) (77/50 - 134/89)  BP(mean): 70 (16 Mar 2022 02:15) (59 - 93)  RR: 16 (16 Mar 2022 05:45) (14 - 18)  SpO2: 95% (16 Mar 2022 05:45) (95% - 100%)    I&O's Detail    15 Mar 2022 07:01  -  16 Mar 2022 07:00  --------------------------------------------------------  IN:    IV PiggyBack: 100 mL    Lactated Ringers: 1625 mL  Total IN: 1725 mL    OUT:    Indwelling Catheter - Urethral (mL): 690 mL  Total OUT: 690 mL    Total NET: 1035 mL    Physical Exam:  General: NAD, resting comfortably in bed  Pulmonary: Nonlabored breathing, no respiratory distress  Abdominal: soft, nondistended, appropriate incisional tenderness, wound vac in place. No herniation or defect noted on abdominal wall.  Incision: C/D/I   Drains: Tello in place with left U-cath  Extremities: WWP, difficulty with extending left knee (1/5)    LABS:                        11.6   8.88  )-----------( 231      ( 16 Mar 2022 05:18 )             36.4     03-16    141  |  109<H>  |  20  ----------------------------<  151<H>  5.0   |  21<L>  |  1.18    Ca    7.9<L>      16 Mar 2022 05:18  Phos  5.0     03-16  Mg     2.1     03-16                           SURGERY DAILY PROGRESS NOTE:       SUBJECTIVE/ROS: Patient is s/p Dev reversal and repair of midline abdominal wall defect. Had an extended PACU stay due to needing pressors secondary to spinal epidural bupivacaine. He has been complaining of left lower extremity weakness after receiving the spinal epidural Anesthesia saw the patient and will reevaluate patient in AM.    Patient seen and examined at bedside during morning rounds. Still c/o LLE weakness and inability to extend knee.    OBJECTIVE:    Vital Signs Last 24 Hrs  T(C): 36.8 (16 Mar 2022 05:45), Max: 36.9 (15 Mar 2022 11:35)  T(F): 98.2 (16 Mar 2022 05:45), Max: 98.4 (15 Mar 2022 11:35)  HR: 87 (16 Mar 2022 05:45) (75 - 107)  BP: 94/58 (16 Mar 2022 05:45) (77/50 - 134/89)  BP(mean): 70 (16 Mar 2022 02:15) (59 - 93)  RR: 16 (16 Mar 2022 05:45) (14 - 18)  SpO2: 95% (16 Mar 2022 05:45) (95% - 100%)    I&O's Detail    15 Mar 2022 07:01  -  16 Mar 2022 07:00  --------------------------------------------------------  IN:    IV PiggyBack: 100 mL    Lactated Ringers: 1625 mL  Total IN: 1725 mL    OUT:    Indwelling Catheter - Urethral (mL): 690 mL  Total OUT: 690 mL    Total NET: 1035 mL    Physical Exam:  General: NAD, resting comfortably in bed  Pulmonary: Nonlabored breathing, no respiratory distress  Abdominal: soft, nondistended, appropriate incisional tenderness, wound vac in place. No herniation or defect noted on abdominal wall.  Incision: C/D/I   Drains: Tello in place with left U-cath  Extremities: WWP, difficulty with extending left knee (1/5)      LABS:                        11.6   8.88  )-----------( 231      ( 16 Mar 2022 05:18 )             36.4     03-16    141  |  109<H>  |  20  ----------------------------<  151<H>  5.0   |  21<L>  |  1.18    Ca    7.9<L>      16 Mar 2022 05:18  Phos  5.0     03-16  Mg     2.1     03-16

## 2022-03-16 NOTE — DIETITIAN INITIAL EVALUATION ADULT. - ORAL INTAKE PTA/DIET HISTORY
Pt reports good PO intake and appetite PTA, consumes regular diet that is lower in fiber. NKFA. Pt denies chewing/swallowing difficulty, nausea, vomiting. Reports no issues with colostomy output. Was taking zinc and vitamin C supplement.

## 2022-03-16 NOTE — PHYSICAL THERAPY INITIAL EVALUATION ADULT - GAIT DEVIATIONS NOTED, PT EVAL
decreased palmira/increased time in double stance/decreased velocity of limb motion/decreased stride length/decreased weight-shifting ability

## 2022-03-16 NOTE — DIETITIAN INITIAL EVALUATION ADULT. - PERTINENT LABORATORY DATA
03-16 @ 05:18: Na 141, BUN 20, Cr 1.18, <H>, K+ 5.0, Phos 5.0<H>, Mg 2.1, HbA1c 7.5% (2/26)  POCT Blood Glucose.: 141 mg/dL (03-16-22 @ 05:57)  POCT Blood Glucose.: 165 mg/dL (03-15-22 @ 21:30)  POCT Blood Glucose.: 160 mg/dL (03-15-22 @ 19:29)  POCT Blood Glucose.: 237 mg/dL (03-15-22 @ 11:24)

## 2022-03-16 NOTE — BRIEF OPERATIVE NOTE - NSICDXBRIEFPROCEDURE_GEN_ALL_CORE_FT
PROCEDURES:  Rigid proctosigmoidoscpy 15-Mar-2022 16:43:59  Christopher Burgess  Repair, hernia, incisional, reducible, recurrent 16-Mar-2022 10:38:39  Mann Wagner  
PROCEDURES:  Rigid proctosigmoidoscpy 15-Mar-2022 16:43:59  Christopher Burgess  Repair, hernia, incisional, reducible, recurrent 16-Mar-2022 10:38:39  Mann Wagner  
PROCEDURES:  Reversal, Dev procedure 15-Mar-2022 16:40:21  Christopher Burgess  Rigid proctosigmoidoscpy 15-Mar-2022 16:43:59  Christopher Burgess

## 2022-03-16 NOTE — PROGRESS NOTE ADULT - SUBJECTIVE AND OBJECTIVE BOX
TEAM Surgery Progress Note    INTERVAL EVENTS: Patient is s/p Dev reveral and repair of midline abdominal wall defect. Had an extended PACU stay due to needing pressors secondary to spinal epidural bupivacaine. He has been complaining of left lower extremity weakness after receiving the spinal epidural Anesthesia saw the patient. We will fu with patient in AM.    SUBJECTIVE: Patient seen and examined at bedside with surgical team      OBJECTIVE:    Vital Signs Last 24 Hrs  T(C): 36.8 (16 Mar 2022 02:43), Max: 36.9 (15 Mar 2022 11:35)  T(F): 98.3 (16 Mar 2022 02:43), Max: 98.4 (15 Mar 2022 11:35)  HR: 87 (16 Mar 2022 02:43) (75 - 107)  BP: 90/51 (16 Mar 2022 02:43) (77/50 - 134/89)  BP(mean): 70 (16 Mar 2022 02:15) (59 - 93)  RR: 16 (16 Mar 2022 02:43) (14 - 18)  SpO2: 95% (16 Mar 2022 02:43) (95% - 100%)I&O's Detail    15 Mar 2022 07:01  -  16 Mar 2022 02:47  --------------------------------------------------------  IN:    Lactated Ringers: 1000 mL  Total IN: 1000 mL    OUT:    Indwelling Catheter - Urethral (mL): 490 mL  Total OUT: 490 mL    Total NET: 510 mL      MEDICATIONS  (STANDING):  acetaminophen   IVPB .. 1000 milliGRAM(s) IV Intermittent once  acetaminophen   IVPB .. 1000 milliGRAM(s) IV Intermittent once  acetaminophen   IVPB .. 1000 milliGRAM(s) IV Intermittent once  bupivacaine  0.25%/epinephrine 1:[id number] Injectable 10 milliLiter(s) Local Injection once  cefoTEtan  IVPB 2 Gram(s) IV Intermittent once  dextrose 40% Gel 15 Gram(s) Oral once  dextrose 5%. 1000 milliLiter(s) (50 mL/Hr) IV Continuous <Continuous>  dextrose 5%. 1000 milliLiter(s) (100 mL/Hr) IV Continuous <Continuous>  dextrose 50% Injectable 25 Gram(s) IV Push once  dextrose 50% Injectable 12.5 Gram(s) IV Push once  dextrose 50% Injectable 25 Gram(s) IV Push once  fentaNYL    Injectable 100 MICROGram(s) IV Push once  glucagon  Injectable 1 milliGRAM(s) IntraMuscular once  heparin   Injectable 5000 Unit(s) SubCutaneous every 8 hours  hydromorphone (10 MICROgram(s)/mL) + bupivacaine 0.0625% in 0.9% Sodium Chloride PCEA 250 milliLiter(s) Epidural PCA Continuous  insulin lispro (ADMELOG) corrective regimen sliding scale   SubCutaneous three times a day before meals  insulin lispro (ADMELOG) corrective regimen sliding scale   SubCutaneous at bedtime  lactated ringers. 1000 milliLiter(s) (125 mL/Hr) IV Continuous <Continuous>  morphine PF Epidural 2 milliGRAM(s) Epidural once  phenylephrine    Infusion 0.5 MICROgram(s)/kG/Min (20.5 mL/Hr) IV Continuous <Continuous>    MEDICATIONS  (PRN):  fentaNYL    Injectable 25 MICROGram(s) IV Push every 5 minutes PRN Moderate Pain (4 - 6)  HYDROmorphone  Injectable 0.5 milliGRAM(s) IV Push once PRN Severe Pain (7 - 10)  HYDROmorphone  Injectable 0.5 milliGRAM(s) IV Push every 10 minutes PRN Severe Pain (7 - 10)  naloxone Injectable 0.1 milliGRAM(s) IV Push every 3 minutes PRN For ANY of the following changes in patient status:  A. RR LESS THAN 10 breaths per minute, B. Oxygen saturation LESS THAN 90%, C. Sedation score of 6  ondansetron Injectable 4 milliGRAM(s) IV Push every 6 hours PRN Nausea  ondansetron Injectable 4 milliGRAM(s) IV Push once PRN Nausea and/or Vomiting    Physical Exam:  General: NAD, resting comfortably in bed  Neuro: A/O x 3, no focal deficits  Pulmonary: Nonlabored breathing, no respiratory distress  Cardiovascular: NSR  Abdominal: soft, NTTP, ND, wound vac in place. No herniation or defect noted on abdominal wall.  Incision: C/D/I   Drains: Tello in place with left U-cath  Extremities: WWP, difficulty with extending left knee (1/5)      LABS:                        13.7   13.26 )-----------( 258      ( 15 Mar 2022 19:26 )             42.0     03-15    140  |  106  |  17  ----------------------------<  179<H>  4.7   |  18<L>  |  1.22    Ca    8.3<L>      15 Mar 2022 19:26            ABO Interpretation: O (03-15-22 @ 11:37)      IMAGING:     TEAM Surgery Progress Note    SUBJECTIVE/INTERVAL EVENTS: Patient seen and examined at bedside with surgical team. Needs better pain control. Patient is s/p Dev reveral and repair of midline abdominal wall defect. Had an extended PACU stay due to needing pressors secondary to spinal epidural bupivacaine. He has been complaining of left lower extremity weakness after receiving the spinal epidural Anesthesia saw the patient and will reevaluate patient in AM.      OBJECTIVE:     Vital Signs Last 24 Hrs  T(C): 36.8 (16 Mar 2022 05:45), Max: 36.9 (15 Mar 2022 11:35)  T(F): 98.2 (16 Mar 2022 05:45), Max: 98.4 (15 Mar 2022 11:35)  HR: 87 (16 Mar 2022 05:45) (75 - 107)  BP: 94/58 (16 Mar 2022 05:45) (77/50 - 134/89)  BP(mean): 70 (16 Mar 2022 02:15) (59 - 93)  RR: 16 (16 Mar 2022 05:45) (14 - 18)  SpO2: 95% (16 Mar 2022 05:45) (95% - 100%)    MEDICATIONS  (STANDING):  acetaminophen   IVPB .. 1000 milliGRAM(s) IV Intermittent once  acetaminophen   IVPB .. 1000 milliGRAM(s) IV Intermittent once  acetaminophen   IVPB .. 1000 milliGRAM(s) IV Intermittent once  bupivacaine  0.25%/epinephrine 1:[id number] Injectable 10 milliLiter(s) Local Injection once  cefoTEtan  IVPB 2 Gram(s) IV Intermittent once  dextrose 40% Gel 15 Gram(s) Oral once  dextrose 5%. 1000 milliLiter(s) (50 mL/Hr) IV Continuous <Continuous>  dextrose 5%. 1000 milliLiter(s) (100 mL/Hr) IV Continuous <Continuous>  dextrose 50% Injectable 25 Gram(s) IV Push once  dextrose 50% Injectable 12.5 Gram(s) IV Push once  dextrose 50% Injectable 25 Gram(s) IV Push once  fentaNYL    Injectable 100 MICROGram(s) IV Push once  glucagon  Injectable 1 milliGRAM(s) IntraMuscular once  heparin   Injectable 5000 Unit(s) SubCutaneous every 8 hours  hydromorphone (10 MICROgram(s)/mL) + bupivacaine 0.0625% in 0.9% Sodium Chloride PCEA 250 milliLiter(s) Epidural PCA Continuous  insulin lispro (ADMELOG) corrective regimen sliding scale   SubCutaneous three times a day before meals  insulin lispro (ADMELOG) corrective regimen sliding scale   SubCutaneous at bedtime  lactated ringers. 1000 milliLiter(s) (125 mL/Hr) IV Continuous <Continuous>  morphine PF Epidural 2 milliGRAM(s) Epidural once  phenylephrine    Infusion 0.5 MICROgram(s)/kG/Min (20.5 mL/Hr) IV Continuous <Continuous>    MEDICATIONS  (PRN):  fentaNYL    Injectable 25 MICROGram(s) IV Push every 5 minutes PRN Moderate Pain (4 - 6)  HYDROmorphone  Injectable 0.5 milliGRAM(s) IV Push once PRN Severe Pain (7 - 10)  HYDROmorphone  Injectable 0.5 milliGRAM(s) IV Push every 10 minutes PRN Severe Pain (7 - 10)  naloxone Injectable 0.1 milliGRAM(s) IV Push every 3 minutes PRN For ANY of the following changes in patient status:  A. RR LESS THAN 10 breaths per minute, B. Oxygen saturation LESS THAN 90%, C. Sedation score of 6  ondansetron Injectable 4 milliGRAM(s) IV Push every 6 hours PRN Nausea  ondansetron Injectable 4 milliGRAM(s) IV Push once PRN Nausea and/or Vomiting    Physical Exam:  General: NAD, resting comfortably in bed  Neuro: A/O x 3  Pulmonary: Nonlabored breathing, no respiratory distress  Abdominal: soft, nondistended, appropriate incisional tenderness, wound vac in place. No herniation or defect noted on abdominal wall.  Incision: C/D/I   Drains: Tello in place with left U-cath  Extremities: WWP, difficulty with extending left knee (1/5)         LABS:  cret                        11.6   8.88  )-----------( 231      ( 16 Mar 2022 05:18 )             36.4     03-16    141  |  109<H>  |  20  ----------------------------<  151<H>  5.0   |  21<L>  |  1.18    Ca    7.9<L>      16 Mar 2022 05:18  Phos  5.0     03-16  Mg     2.1     03-16

## 2022-03-16 NOTE — PHYSICAL THERAPY INITIAL EVALUATION ADULT - GENERAL OBSERVATIONS, REHAB EVAL
received semisupine in bed, pleasant & eager to participate, s/p Deleon Reversal, protosigmoidoscopy(3/15), +PCEA, +wound VAC, +chavarria. LLE weakness at this time, due to epidural in OR

## 2022-03-16 NOTE — PROGRESS NOTE ADULT - ASSESSMENT
56y with PMH of HTN , Covid in August of 2021, Status post Dev procedure for perforated diverticulitis in 9/21@ [facility] / discharged home with an extended course of antibiotic and required wound VAC placement which was removed . Pt noted with abdominal bulge, c/o abdominal pain/discomfort noted with abdominal wall hernia. Patient now s/p Dev reversal and repair of midline abdominal wall defect (3/15). Patient requiring pressor support secondary to administration of epidural bupivacaine. Pain now controlled and being weaned off ben.     PLAN:  - Continue to monitor wound vac output  - Pain control  - Encourage IS  - Nausea control PRN  - Monitor vitals  - Keep Tello, but remove left u-cath  - Diet: IVF + NPO w sips  - Monitor I+Os  - OOB/ Ambulate  - DVT ppx  - Primary care per red team surgery    Green Team Surgery, [phone] 56y with PMH of HTN , Covid in August of 2021, Status post Dev procedure for perforated diverticulitis in 9/21@ [facility] / discharged home with an extended course of antibiotic and required wound VAC placement which was removed . Pt noted with abdominal bulge, c/o abdominal pain/discomfort noted with abdominal wall hernia. Patient now s/p Dev reversal and repair of midline abdominal wall defect (3/15). Patient requiring pressor support secondary to administration of epidural bupivacaine. Pain now controlled and being weaned off ben.     PLAN:  - Continue to monitor wound vac output.   - Pain control as per Pain and primary team (pt complaining of PCEA).  * - FU left lower extremity weakness. Present since spinal epidural was placed.   - Encourage IS  - Nausea control PRN  - Monitor vitals  - Keep Tello, but remove left u-cath  - Diet: IVF + NPO w sips  - Monitor I+Os  - OOB/ Ambulate/ PT  - DVT ppx  - Primary care per red team surgery    Green Team Surgery, [phone]

## 2022-03-16 NOTE — PHYSICAL THERAPY INITIAL EVALUATION ADULT - MODALITIES TREATMENT COMMENTS
Pt tolerated VAC Eval well for midline and left lateral abdomen wounds. Pt with two wounds noted: midline abdomen and L lateral abdomen. Midline abdomen wound measured: 24.5cm x 1.0cm x 3.0cm with +undermining 5.0cm from 7:00-9:00. L lateral abdomen wound measured: 5.5cm x 3.5cm x 3.4cm with area of communicating/tunneline at least 6.0cm from left lateral abdomen wound into midline abdomen wound. Wounds mainly granular with adipose tissue noted left lateral abdomen wound, staples intact midline abdomen. Wounds cleansed with NS, pad dry with gauze, cavilon to periwound, white foam to depth of wound beds via wicking procedure and to communicating/undermining regions, adaptic non-adherent ontop, followed by black foam bridged together with good seal noted 125mmHg continuous, secured with tape to lateral abdomen.

## 2022-03-16 NOTE — PROGRESS NOTE ADULT - SUBJECTIVE AND OBJECTIVE BOX
Day 1 of Anesthesia Pain Management Service    SUBJECTIVE: Its not working too well    Pain Scale Score:	[X] Refer to charted pain scores    THERAPY:    [ ] IV PCA Morphine		[ ] 5 mg/mL	[ ] 1 mg/mL  [X] IV PCA Hydromorphone	[ ] 5 mg/mL	[X] 1 mg/mL  [ ] IV PCA Fentanyl		[ ] 50 micrograms/mL    Demand dose: 0.2 mg     Lockout: 6 minutes   Continuous Rate: 0 mg/hr  4 Hour Limit: 4 mg    MEDICATIONS  (STANDING):  acetaminophen   IVPB .. 1000 milliGRAM(s) IV Intermittent once  bupivacaine  0.25%/epinephrine 1:[id number] Injectable 10 milliLiter(s) Local Injection once  dextrose 40% Gel 15 Gram(s) Oral once  dextrose 40% Gel 15 Gram(s) Oral once  dextrose 5%. 1000 milliLiter(s) (50 mL/Hr) IV Continuous <Continuous>  dextrose 5%. 1000 milliLiter(s) (100 mL/Hr) IV Continuous <Continuous>  dextrose 5%. 1000 milliLiter(s) (50 mL/Hr) IV Continuous <Continuous>  dextrose 5%. 1000 milliLiter(s) (100 mL/Hr) IV Continuous <Continuous>  dextrose 50% Injectable 25 Gram(s) IV Push once  dextrose 50% Injectable 12.5 Gram(s) IV Push once  dextrose 50% Injectable 25 Gram(s) IV Push once  dextrose 50% Injectable 25 Gram(s) IV Push once  dextrose 50% Injectable 12.5 Gram(s) IV Push once  dextrose 50% Injectable 25 Gram(s) IV Push once  fentaNYL    Injectable 100 MICROGram(s) IV Push once  glucagon  Injectable 1 milliGRAM(s) IntraMuscular once  glucagon  Injectable 1 milliGRAM(s) IntraMuscular once  heparin   Injectable 5000 Unit(s) SubCutaneous every 8 hours  HYDROmorphone PCA (1 mG/mL) 30 milliLiter(s) PCA Continuous PCA Continuous  insulin lispro (ADMELOG) corrective regimen sliding scale   SubCutaneous every 6 hours  insulin lispro (ADMELOG) corrective regimen sliding scale   SubCutaneous at bedtime  lactated ringers. 1000 milliLiter(s) (125 mL/Hr) IV Continuous <Continuous>    MEDICATIONS  (PRN):  HYDROmorphone PCA (1 mG/mL) Rescue Clinician Bolus 0.5 milliGRAM(s) IV Push every 15 minutes PRN for Pain Scale GREATER THAN 6  naloxone Injectable 0.1 milliGRAM(s) IV Push every 3 minutes PRN For ANY of the following changes in patient status:  A. RR LESS THAN 10 breaths per minute, B. Oxygen saturation LESS THAN 90%, C. Sedation score of 6  ondansetron Injectable 4 milliGRAM(s) IV Push every 6 hours PRN Nausea      OBJECTIVE:    Sedation Score:	[ X] Alert 	[ ] Drowsy 	[ ] Arousable	[ ] Asleep	[ ] Unresponsive    Side Effects:	[X ] None	[ ] Nausea	[ ] Vomiting	[ ] Pruritus  		[ ] Other:    Vital Signs Last 24 Hrs  T(C): 36.7 (16 Mar 2022 08:20), Max: 36.8 (16 Mar 2022 02:43)  T(F): 98.1 (16 Mar 2022 08:20), Max: 98.3 (16 Mar 2022 02:43)  HR: 95 (16 Mar 2022 08:20) (75 - 99)  BP: 93/59 (16 Mar 2022 08:20) (77/50 - 126/72)  BP(mean): 70 (16 Mar 2022 02:15) (59 - 93)  RR: 18 (16 Mar 2022 08:20) (14 - 18)  SpO2: 95% (16 Mar 2022 09:47) (93% - 100%)    ASSESSMENT/ PLAN    Therapy to  be:               [X] Continued   [ ] Discontinued   [ ] Changed to PRN Analgesics    Documentation and Verification of current medications:   [X] Done	[ ] Not done, not eligible    Comments: Coagulation status and labs reviewed.  Epidural catheter removed. Tip intact. LL extremity motor blockade resolved. Ambulated earlier.  PCA use 1-4x/hr. Reeducated and encouraged increased use.

## 2022-03-16 NOTE — PHYSICAL THERAPY INITIAL EVALUATION ADULT - PLANNED THERAPY INTERVENTIONS, PT EVAL
GOALS: Pt will negotiate 12 steps with unilateral handrail & step to pattern with independence in 4wks/bed mobility training/gait training/transfer training GOALS: Pt will negotiate 12 steps with unilateral handrail & step to pattern with independence in 4wks/ GOAL: WC MANAGEMENT/bed mobility training/gait training/transfer training

## 2022-03-16 NOTE — PROGRESS NOTE ADULT - SUBJECTIVE AND OBJECTIVE BOX
Day 1 of Anesthesia Pain Management Service    SUBJECTIVE: I've had pain throughout the night     Pain Scale Score:   Refer to charted pain scores    THERAPY:  [X] Epidural Bupivacaine 0.0625% and Hydromorphone         [X] 10 micrograms/mL 	[ ] 5 micrograms/mL  [ ] Epidural Ropivacaine 0.2% plain – 1 mg/mL    Demand dose: 3 mL  Lockout: 15 minutes  Continuous Rate: 8 mL/hr    MEDICATIONS  (STANDING):  acetaminophen   IVPB .. 1000 milliGRAM(s) IV Intermittent once  bupivacaine  0.25%/epinephrine 1:[id number] Injectable 10 milliLiter(s) Local Injection once  cefoTEtan  IVPB 2 Gram(s) IV Intermittent once  dextrose 40% Gel 15 Gram(s) Oral once  dextrose 40% Gel 15 Gram(s) Oral once  dextrose 5%. 1000 milliLiter(s) (50 mL/Hr) IV Continuous <Continuous>  dextrose 5%. 1000 milliLiter(s) (100 mL/Hr) IV Continuous <Continuous>  dextrose 5%. 1000 milliLiter(s) (50 mL/Hr) IV Continuous <Continuous>  dextrose 5%. 1000 milliLiter(s) (100 mL/Hr) IV Continuous <Continuous>  dextrose 50% Injectable 25 Gram(s) IV Push once  dextrose 50% Injectable 12.5 Gram(s) IV Push once  dextrose 50% Injectable 25 Gram(s) IV Push once  dextrose 50% Injectable 25 Gram(s) IV Push once  dextrose 50% Injectable 12.5 Gram(s) IV Push once  dextrose 50% Injectable 25 Gram(s) IV Push once  fentaNYL    Injectable 100 MICROGram(s) IV Push once  glucagon  Injectable 1 milliGRAM(s) IntraMuscular once  glucagon  Injectable 1 milliGRAM(s) IntraMuscular once  heparin   Injectable 5000 Unit(s) SubCutaneous every 8 hours  HYDROmorphone PCA (1 mG/mL) 30 milliLiter(s) PCA Continuous PCA Continuous  insulin lispro (ADMELOG) corrective regimen sliding scale   SubCutaneous every 6 hours  insulin lispro (ADMELOG) corrective regimen sliding scale   SubCutaneous at bedtime  lactated ringers. 1000 milliLiter(s) (125 mL/Hr) IV Continuous <Continuous>    MEDICATIONS  (PRN):  HYDROmorphone PCA (1 mG/mL) Rescue Clinician Bolus 0.5 milliGRAM(s) IV Push every 15 minutes PRN for Pain Scale GREATER THAN 6  naloxone Injectable 0.1 milliGRAM(s) IV Push every 3 minutes PRN For ANY of the following changes in patient status:  A. RR LESS THAN 10 breaths per minute, B. Oxygen saturation LESS THAN 90%, C. Sedation score of 6  ondansetron Injectable 4 milliGRAM(s) IV Push every 6 hours PRN Nausea      OBJECTIVE:    Assessment of Catheter Site:    [X] Epidural 	  [X] Dressing intact	[X] Site non-tender	[X] Site without erythema, discharge, edema  [X] Epidural tubing and connection checked	[X] Gross neurological exam within normal limits  [ ] Catheter removed – tip intact		[X] Afebrile	            [ ] Febrile: ___                          11.6   8.88  )-----------( 231      ( 16 Mar 2022 05:18 )             36.4     Vital Signs Last 24 Hrs  T(C): 36.8 (03-16-22 @ 05:45), Max: 36.9 (03-15-22 @ 11:35)  T(F): 98.2 (03-16-22 @ 05:45), Max: 98.4 (03-15-22 @ 11:35)  HR: 87 (03-16-22 @ 05:45) (75 - 107)  BP: 94/58 (03-16-22 @ 05:45) (77/50 - 134/89)  BP(mean): 70 (03-16-22 @ 02:15) (59 - 93)  RR: 16 (03-16-22 @ 05:45) (14 - 18)  SpO2: 95% (03-16-22 @ 05:45) (95% - 100%)      Sedation Score:	[X] Alert 	[ ] Drowsy	[ ] Arousable  [ ] Asleep     [ ] Unresponsive    Side Effects:	[ ] None	[ ] Nausea	[ ] Vomiting   [ ] Pruritus  		[X ] Weakness: Left lower extremity     [ ] Numbness	[ ] Other:    ASSESSMENT/ PLAN:    Therapy:	[X] Continue   [ ] Discontinue   [ ] Change to PRN Analgesics   [ ] Change to PCA    Documentation and Verification of current medications:  [X] Done	[ ] Not done, not eligible, reason:    COMMENTS: PCEA initiated in PACU, endorsed incisional pain requiring Bupivacaine 0.25%/Epi bolus by anesthesia. Endorsed relief after bolus followed by drop in BP.  Option offered to remove epidural last pm but patient requested to wait until the am to discuss with Dr Campos. Anticoagulation status reviewed.  Epidural capped. PCA Dilaudid initiated, therapy explained to patient. Epidural to be removed later this am, 4-5hours following SQ Heparin.

## 2022-03-16 NOTE — PHYSICAL THERAPY INITIAL EVALUATION ADULT - ADDITIONAL COMMENTS
Pt resides in private home with spouse and 3 young children, 3 steps to enter, flight with handrail within. PTA independent with mobility and ADL's, (+)driving, works as psychologist.

## 2022-03-16 NOTE — PROGRESS NOTE ADULT - ASSESSMENT
ASSESSMENT:56y with PMH of HTN , Covid in August of 2021, Status post Dev procedure for perforated diverticulitis in 9/21@ [facility] / discharged home with an extended course of antibiotic and required wound VAC placement which was removed . Pt noted with abdominal bulge, c/o abdominal pain/discomfort noted with abdominal wall hernia. Patient now s/p Dev reveral and repair of midline abdominal wall defect (3/15). Patient requiring pressor support secondary to administration of epidural bupivacaine. Pain now controlled and being weaned off ben.     PLAN:  - FU left lower extremity weakness. Present since spinal epidural was placed.  - Pain control  - Encourage IS  - Nausea control PRN  - Monitor vitals  - Tello with left u-cath  - Diet: IVF + NPO; may consider sips tomorrow   - Monitor I+Os  - OOB/ Ambulate  - DVT ppx     Team Surgery. ASSESSMENT:56y with PMH of HTN , Covid in August of 2021, Status post Dev procedure for perforated diverticulitis in 9/21@ [facility] / discharged home with an extended course of antibiotic and required wound VAC placement which was removed . Pt noted with abdominal bulge, c/o abdominal pain/discomfort noted with abdominal wall hernia. Patient now s/p Dev reveral and repair of midline abdominal wall defect (3/15). Patient requiring pressor support secondary to administration of epidural bupivacaine. Pain now controlled and being weaned off ben.     PLAN:  - FU left lower extremity weakness. Present since spinal epidural was placed.Anesthesia will evaluate the patient and assess if PCEA can be removed and replaced with PCA.  - Pain control  - Encourage IS  - Nausea control PRN  - Monitor vitals  - Keep Tello, but remove left u-cath  - Diet: IVF + NPO w sips  - Monitor I+Os  - OOB/ Ambulate  - DVT ppx     Red Team Surgery  [id number]

## 2022-03-16 NOTE — BRIEF OPERATIVE NOTE - OPERATION/FINDINGS
Large recurrent incisional hernia - repeiared with a 20x30 IPOM
Ovitrex repair of ventral rec incisional hernia
Large defect of anterior midline abdominal wall, repair of which will be dictated by Dr. Wagner in a separate note. Dev's takedown with reconstitution of continuity with primary end to end stapled coloproctostomy using single firing of 29 mm EEA stapler. Densely adherent rectal stump. Endoscopic leak testing with no evidence of leak and hemostatic anastomosis at 16 cm.

## 2022-03-16 NOTE — DIETITIAN INITIAL EVALUATION ADULT. - ADD RECOMMEND
1) Medical team to advance diet as feasible. Consider advancing to carbohydrate consistent clear liquid, followed by carbohydrate consistent low fiber diet with Glucerna 1x daily  as tolerated. 2) Diet education provided, reinforce as needed. 3) RD to remain available and follow-up as medically appropriate.

## 2022-03-16 NOTE — BRIEF OPERATIVE NOTE - NSICDXBRIEFPREOP_GEN_ALL_CORE_FT
PRE-OP DIAGNOSIS:  Diverticulitis 15-Mar-2022 16:40:34  Christopher Burgess  
PRE-OP DIAGNOSIS:  Diverticulitis 15-Mar-2022 16:40:34  Christopher Burgess  Recurrent ventral incisional hernia 16-Mar-2022 11:21:17  Mann Wagner  
PRE-OP DIAGNOSIS:  Diverticulitis 15-Mar-2022 16:40:34  Christopher Burgess

## 2022-03-16 NOTE — CHART NOTE - NSCHARTNOTEFT_GEN_A_CORE
Patient complaining of left lower extremity weakness, specifically localized to inability to extend his left leg (1/5). Anesthesia attending was called to bedside and discussed the possibility that this deficit is likely temporary and related to the spread of pain medication through the spinal epidural catheter. Patient was given the option of removing the epidural now or waiting to remove it later. Patient decided to keep the epidural catheter in place for now and to be reassessed in the morning. Patient would like to first speak with Dr. Campos in morning prior to removal of catheter. Patient complaining of left lower extremity weakness, specifically localized to inability to extend his left leg (1/5). Anesthesia attending was called to bedside and discussed the possibility that this deficit is likely temporary and related to the spread of pain medication through the spinal epidural catheter. Patient was given the option of removing the epidural now or waiting to remove it later. Patient decided to keep the epidural catheter in place for now and to be reassessed in the morning. Patient would like to first speak with Dr. Campos in morning prior to removal of catheter. Plans discussed with senior surgical resident and colorectal surgery fellow.

## 2022-03-16 NOTE — PHYSICAL THERAPY INITIAL EVALUATION ADULT - PERTINENT HX OF CURRENT PROBLEM, REHAB EVAL
Pt is 56M admitted 3/15/22 PMHx HTN, Covid in August of 2021, s/p Dev procedure for perforated diverticulitis in 9/21 @ [facility] / discharged home with an extended course of antibiotic and required wound VAC placement which was removed . Pt noted with abdominal bulge, c/o abdominal pain/discomfort noted with abdominal wall hernia. Pt was seen by Dr. Chi Campos and Dr Wagner -  for Dev reversal combined with incisional hernia repair.

## 2022-03-16 NOTE — DIETITIAN INITIAL EVALUATION ADULT. - CHIEF COMPLAINT
This is a ":56y with PMH of HTN , Covid in August of 2021, Status post Dev procedure for perforated diverticulitis in 9/21@ [facility] / discharged home with an extended course of antibiotic and required wound VAC placement which was removed . Pt noted with abdominal bulge, c/o abdominal pain/discomfort noted with abdominal wall hernia. Patient now s/p Dev reveral and repair of midline abdominal wall defect (3/15)."

## 2022-03-16 NOTE — PROGRESS NOTE ADULT - SUBJECTIVE AND OBJECTIVE BOX
Pain Management Attending Addendum    SUBJECTIVE: Patient doing well with PCEA    Therapy:    [X] PCEA    OBJECTIVE:   [X] Pain appropriately controlled    [ ] Other:    Side Effects:  [X] None	             [ ] Nausea              [ ] Pruritis        [ ] Weakness          [ ] Numbness        	[ ] Other:    ASSESSMENT/PLAN:    [ ] Continue current therapy    [X] Therapy changed to:    [X] PRN Analgesics   [ ] IV PCA    Comments: epidural capped and to be removed 4 hours following sq heparin administration Pain Management Attending Addendum    SUBJECTIVE: Patient doing well with PCEA    Therapy:    [X] PCEA    OBJECTIVE:   [X] Pain appropriately controlled    [ ] Other:    Side Effects:  [X] None	             [ ] Nausea              [ ] Pruritis        [ ] Weakness          [ ] Numbness        	[ ] Other:    ASSESSMENT/PLAN:    [ ] Continue current therapy    [X] Therapy changed to:    [ ] PRN Analgesics   [x] IV PCA    Comments: epidural capped and to be removed 4 hours following sq heparin administration

## 2022-03-16 NOTE — PHYSICAL THERAPY INITIAL EVALUATION ADULT - DIAGNOSIS, PT EVAL
impaired balance, strength, endurance for functional mobility impaired balance, strength, endurance for functional mobility/integumentary impairments

## 2022-03-16 NOTE — BRIEF OPERATIVE NOTE - NSICDXBRIEFPOSTOP_GEN_ALL_CORE_FT
POST-OP DIAGNOSIS:  Recurrent ventral incisional hernia 16-Mar-2022 11:21:49  Mann Wagner  
POST-OP DIAGNOSIS:  Diverticulitis 15-Mar-2022 16:40:46  Christopher Burgess

## 2022-03-16 NOTE — DIETITIAN INITIAL EVALUATION ADULT. - OTHER INFO
Weight: Pt reports some weight gain over the last 6 months. Reports UBW is 220lbs, reports recently weight was ~ 233lbs, pt attributes this to dietary indiscretions. Pt expressed desire to lose weight once healed. Current dosing weight is 240lbs. Will continue to monitor.     Pt with elevated HbA1c 7.5% (2/26), no history of diabetes noted per chart, pt not on any diabetes medications per H&P. Unclear if patient aware of elevated HbA1c, will discuss with provider.     Since admission pt has been NPO (x 1 day). Pt reports feeling well, eager for diet advancement. No BM noted since surgery. Pt receptive to diet education. Provided low-fiber nutrition therapy including importance of avoiding  fiber rich foods, fresh fruits/vegetables, whole grains, and added fiber in processed foods. Discussed chewing foods well and adequate hydration and protein intake. Encouraged pairing protein with carbohydrates and avoidance of concentrated sweets.  Discussed gradual reintroduction of fiber back into diet once cleared by MD. Pt verbalized understanding and accepted written handout. Patient with no nutrition-related questions at this time. Made aware RD remains available as needed.

## 2022-03-17 LAB
ANION GAP SERPL CALC-SCNC: 13 MMOL/L — SIGNIFICANT CHANGE UP (ref 5–17)
BUN SERPL-MCNC: 13 MG/DL — SIGNIFICANT CHANGE UP (ref 7–23)
CALCIUM SERPL-MCNC: 9 MG/DL — SIGNIFICANT CHANGE UP (ref 8.4–10.5)
CHLORIDE SERPL-SCNC: 103 MMOL/L — SIGNIFICANT CHANGE UP (ref 96–108)
CO2 SERPL-SCNC: 25 MMOL/L — SIGNIFICANT CHANGE UP (ref 22–31)
CREAT SERPL-MCNC: 0.9 MG/DL — SIGNIFICANT CHANGE UP (ref 0.5–1.3)
EGFR: 100 ML/MIN/1.73M2 — SIGNIFICANT CHANGE UP
GLUCOSE BLDC GLUCOMTR-MCNC: 103 MG/DL — HIGH (ref 70–99)
GLUCOSE BLDC GLUCOMTR-MCNC: 119 MG/DL — HIGH (ref 70–99)
GLUCOSE BLDC GLUCOMTR-MCNC: 140 MG/DL — HIGH (ref 70–99)
GLUCOSE BLDC GLUCOMTR-MCNC: 152 MG/DL — HIGH (ref 70–99)
GLUCOSE BLDC GLUCOMTR-MCNC: 156 MG/DL — HIGH (ref 70–99)
GLUCOSE BLDC GLUCOMTR-MCNC: 172 MG/DL — HIGH (ref 70–99)
GLUCOSE SERPL-MCNC: 138 MG/DL — HIGH (ref 70–99)
HCT VFR BLD CALC: 37.2 % — LOW (ref 39–50)
HGB BLD-MCNC: 11.5 G/DL — LOW (ref 13–17)
MAGNESIUM SERPL-MCNC: 2.4 MG/DL — SIGNIFICANT CHANGE UP (ref 1.6–2.6)
MCHC RBC-ENTMCNC: 30.2 PG — SIGNIFICANT CHANGE UP (ref 27–34)
MCHC RBC-ENTMCNC: 30.9 GM/DL — LOW (ref 32–36)
MCV RBC AUTO: 97.6 FL — SIGNIFICANT CHANGE UP (ref 80–100)
NRBC # BLD: 0 /100 WBCS — SIGNIFICANT CHANGE UP (ref 0–0)
PHOSPHATE SERPL-MCNC: 2.3 MG/DL — LOW (ref 2.5–4.5)
PLATELET # BLD AUTO: 215 K/UL — SIGNIFICANT CHANGE UP (ref 150–400)
POTASSIUM SERPL-MCNC: 4.1 MMOL/L — SIGNIFICANT CHANGE UP (ref 3.5–5.3)
POTASSIUM SERPL-SCNC: 4.1 MMOL/L — SIGNIFICANT CHANGE UP (ref 3.5–5.3)
RBC # BLD: 3.81 M/UL — LOW (ref 4.2–5.8)
RBC # FLD: 13.9 % — SIGNIFICANT CHANGE UP (ref 10.3–14.5)
SODIUM SERPL-SCNC: 141 MMOL/L — SIGNIFICANT CHANGE UP (ref 135–145)
WBC # BLD: 7.12 K/UL — SIGNIFICANT CHANGE UP (ref 3.8–10.5)
WBC # FLD AUTO: 7.12 K/UL — SIGNIFICANT CHANGE UP (ref 3.8–10.5)

## 2022-03-17 PROCEDURE — 93010 ELECTROCARDIOGRAM REPORT: CPT

## 2022-03-17 RX ORDER — DEXTROSE MONOHYDRATE, SODIUM CHLORIDE, AND POTASSIUM CHLORIDE 50; .745; 4.5 G/1000ML; G/1000ML; G/1000ML
1000 INJECTION, SOLUTION INTRAVENOUS
Refills: 0 | Status: DISCONTINUED | OUTPATIENT
Start: 2022-03-17 | End: 2022-03-22

## 2022-03-17 RX ORDER — ACETAMINOPHEN 500 MG
1000 TABLET ORAL EVERY 6 HOURS
Refills: 0 | Status: COMPLETED | OUTPATIENT
Start: 2022-03-18 | End: 2022-03-19

## 2022-03-17 RX ORDER — CYCLOBENZAPRINE HYDROCHLORIDE 10 MG/1
5 TABLET, FILM COATED ORAL THREE TIMES A DAY
Refills: 0 | Status: DISCONTINUED | OUTPATIENT
Start: 2022-03-17 | End: 2022-03-23

## 2022-03-17 RX ORDER — ACETAMINOPHEN 500 MG
1000 TABLET ORAL EVERY 6 HOURS
Refills: 0 | Status: DISCONTINUED | OUTPATIENT
Start: 2022-03-17 | End: 2022-03-17

## 2022-03-17 RX ORDER — ACETAMINOPHEN 500 MG
1000 TABLET ORAL ONCE
Refills: 0 | Status: COMPLETED | OUTPATIENT
Start: 2022-03-17 | End: 2022-03-17

## 2022-03-17 RX ORDER — HYDROMORPHONE HYDROCHLORIDE 2 MG/ML
30 INJECTION INTRAMUSCULAR; INTRAVENOUS; SUBCUTANEOUS
Refills: 0 | Status: DISCONTINUED | OUTPATIENT
Start: 2022-03-17 | End: 2022-03-21

## 2022-03-17 RX ADMIN — HYDROMORPHONE HYDROCHLORIDE 30 MILLILITER(S): 2 INJECTION INTRAMUSCULAR; INTRAVENOUS; SUBCUTANEOUS at 19:13

## 2022-03-17 RX ADMIN — Medication 1000 MILLIGRAM(S): at 06:30

## 2022-03-17 RX ADMIN — Medication 400 MILLIGRAM(S): at 01:32

## 2022-03-17 RX ADMIN — Medication 62.5 MILLIMOLE(S): at 12:10

## 2022-03-17 RX ADMIN — Medication 1000 MILLIGRAM(S): at 02:00

## 2022-03-17 RX ADMIN — Medication 1000 MILLIGRAM(S): at 18:17

## 2022-03-17 RX ADMIN — HEPARIN SODIUM 5000 UNIT(S): 5000 INJECTION INTRAVENOUS; SUBCUTANEOUS at 06:08

## 2022-03-17 RX ADMIN — HYDROMORPHONE HYDROCHLORIDE 30 MILLILITER(S): 2 INJECTION INTRAMUSCULAR; INTRAVENOUS; SUBCUTANEOUS at 07:34

## 2022-03-17 RX ADMIN — HEPARIN SODIUM 5000 UNIT(S): 5000 INJECTION INTRAVENOUS; SUBCUTANEOUS at 21:54

## 2022-03-17 RX ADMIN — HEPARIN SODIUM 5000 UNIT(S): 5000 INJECTION INTRAVENOUS; SUBCUTANEOUS at 13:48

## 2022-03-17 RX ADMIN — SODIUM CHLORIDE 125 MILLILITER(S): 9 INJECTION, SOLUTION INTRAVENOUS at 06:08

## 2022-03-17 RX ADMIN — Medication 400 MILLIGRAM(S): at 17:31

## 2022-03-17 RX ADMIN — Medication 1: at 13:47

## 2022-03-17 RX ADMIN — DEXTROSE MONOHYDRATE, SODIUM CHLORIDE, AND POTASSIUM CHLORIDE 125 MILLILITER(S): 50; .745; 4.5 INJECTION, SOLUTION INTRAVENOUS at 10:40

## 2022-03-17 RX ADMIN — Medication 1: at 01:21

## 2022-03-17 RX ADMIN — Medication 1000 MILLIGRAM(S): at 13:18

## 2022-03-17 RX ADMIN — Medication 400 MILLIGRAM(S): at 06:09

## 2022-03-17 RX ADMIN — Medication 400 MILLIGRAM(S): at 12:09

## 2022-03-17 NOTE — PROGRESS NOTE ADULT - SUBJECTIVE AND OBJECTIVE BOX
Day 2 of Anesthesia Pain Management Service    SUBJECTIVE: Having some pain     Pain Scale Score:	[X] Refer to charted pain scores    THERAPY:    [ ] IV PCA Morphine		[ ] 5 mg/mL	[ ] 1 mg/mL  [X] IV PCA Hydromorphone	[ ] 5 mg/mL	[X] 1 mg/mL  [ ] IV PCA Fentanyl		[ ] 50 micrograms/mL    Demand dose: 0.2 mg     Lockout: 6 minutes   Continuous Rate: 0 mg/hr  4 Hour Limit: 4 mg    MEDICATIONS  (STANDING):  acetaminophen   IVPB  1000 milliGRAM(s) IV Intermittent every 6 hours  bupivacaine  0.25%/epinephrine [id number] Injectable 10 milliLiter(s) Local Injection once  dextrose 40% Gel 15 Gram(s) Oral once  dextrose 5% + sodium chloride 0.45% with potassium chloride 20 mEq/L 1000 milliLiter(s) (125 mL/Hr) IV Continuous <Continuous>  fentaNYL    Injectable 100 MICROGram(s) IV Push once  glucagon  Injectable 1 milliGRAM(s) IntraMuscular once  glucagon  Injectable 1 milliGRAM(s) IntraMuscular once  heparin   Injectable 5000 Unit(s) SubCutaneous every 8 hours  HYDROmorphone PCA (1 mG/mL) 30 milliLiter(s) PCA Continuous PCA Continuous  insulin lispro (ADMELOG) corrective regimen sliding scale   SubCutaneous every 6 hours  insulin lispro (ADMELOG) corrective regimen sliding scale   SubCutaneous at bedtime  sodium phosphate IVPB 15 milliMole(s) IV Intermittent once    MEDICATIONS  (PRN):  cyclobenzaprine 5 milliGRAM(s) Oral three times a day PRN Muscle Spasm  HYDROmorphone PCA (1 mG/mL) Rescue Clinician Bolus 0.5 milliGRAM(s) IV Push every 15 minutes PRN for Pain Scale GREATER THAN 6  naloxone Injectable 0.1 milliGRAM(s) IV Push every 3 minutes PRN For ANY of the following changes in patient status:  A. RR LESS THAN 10 breaths per minute, B. Oxygen saturation LESS THAN 90%, C. Sedation score of 6  ondansetron Injectable 4 milliGRAM(s) IV Push every 6 hours PRN Nausea      OBJECTIVE:    Sedation Score:	[ X] Alert 	[ ] Drowsy 	[ ] Arousable	[ ] Asleep	[ ] Unresponsive    Side Effects:	[X ] None	[ ] Nausea	[ ] Vomiting	[ ] Pruritus  		[ ] Other:    Vital Signs Last 24 Hrs  T(C): 36.9 (17 Mar 2022 05:56), Max: 37.1 (16 Mar 2022 16:13)  T(F): 98.5 (17 Mar 2022 05:56), Max: 98.8 (16 Mar 2022 16:13)  HR: 100 (17 Mar 2022 09:03) (93 - 120)  BP: 117/76 (17 Mar 2022 09:03) (95/65 - 150/78)  BP(mean): --  RR: 18 (17 Mar 2022 09:03) (18 - 18)  SpO2: 95% (17 Mar 2022 09:03) (91% - 96%)    ASSESSMENT/ PLAN    Therapy to  be:               [X] Continued   [ ] Discontinued   [ ] Changed to PRN Analgesics    Documentation and Verification of current medications:   [X] Done	[ ] Not done, not eligible    Comments: Endorsing incisional pain and abdominal gas / distention.  PCA use 1-7x/hr. Total 13.4mg Dilaudid/24 hours. Demand dose increased to 0.25mg, four hour increased to 6mg. Reeducated to use.

## 2022-03-17 NOTE — PROGRESS NOTE ADULT - SUBJECTIVE AND OBJECTIVE BOX
Pain Management Attending Addendum    SUBJECTIVE: Patient doing well with IV PCA    Therapy:    [X] IV PCA         [ ] PRN Analgesics    OBJECTIVE:   [X] Pain appropriately controlled    [ ] Other:    Side Effects:  [X] None	             [ ] Nausea              [ ] Pruritis                	[ ] Other:    ASSESSMENT/PLAN: Continue current therapy    Comments: pt still NPO. Instructed to use PCA for a few doses before dressing change. will increase demand dose

## 2022-03-17 NOTE — PROGRESS NOTE ADULT - SUBJECTIVE AND OBJECTIVE BOX
SURGERY DAILY PROGRESS NOTE:       SUBJECTIVE/ROS: No acute overnight events. Patient seen and examined at bedside during morning rounds.    OBJECTIVE:    Vital Signs Last 24 Hrs  T(C): 36.9 (17 Mar 2022 05:56), Max: 37.1 (16 Mar 2022 16:13)  T(F): 98.5 (17 Mar 2022 05:56), Max: 98.8 (16 Mar 2022 16:13)  HR: 100 (17 Mar 2022 05:56) (93 - 120)  BP: 150/78 (17 Mar 2022 05:56) (93/59 - 150/78)  BP(mean): --  RR: 18 (17 Mar 2022 05:56) (18 - 18)  SpO2: 95% (17 Mar 2022 05:56) (91% - 96%)    I&O's Detail    15 Mar 2022 07:01  -  16 Mar 2022 07:00  --------------------------------------------------------  IN:    IV PiggyBack: 100 mL    Lactated Ringers: 1625 mL  Total IN: 1725 mL    OUT:    Indwelling Catheter - Urethral (mL): 690 mL  Total OUT: 690 mL    Total NET: 1035 mL      16 Mar 2022 07:01  -  17 Mar 2022 05:57  --------------------------------------------------------  IN:    IV PiggyBack: 100 mL    Lactated Ringers: 1375 mL  Total IN: 1475 mL    OUT:    Indwelling Catheter - Urethral (mL): 1900 mL  Total OUT: 1900 mL    Total NET: -425 mL    Physical Exam:  General: NAD, resting comfortably in bed  Pulmonary: Nonlabored breathing, no respiratory distress  Abdominal: soft, nondistended, appropriate incisional tenderness, wound vac in place. No herniation or defect noted on abdominal wall.  Incision: C/D/I   Drains: Tello in place with left U-cath  Extremities: WWP, difficulty with extending left knee (1/5)    LABS:                        11.6   8.88  )-----------( 231      ( 16 Mar 2022 05:18 )             36.4     03-16    141  |  109<H>  |  20  ----------------------------<  151<H>  5.0   |  21<L>  |  1.18    Ca    7.9<L>      16 Mar 2022 05:18  Phos  5.0     03-16  Mg     2.1     03-16      PT/INR - ( 16 Mar 2022 11:33 )   PT: 10.9 sec;   INR: 0.95 ratio         PTT - ( 16 Mar 2022 11:33 )  PTT:26.2 sec

## 2022-03-17 NOTE — PROGRESS NOTE ADULT - PROVIDER SPECIALTY LIST ADULT
Anesthesia Paramedian Forehead Flap Text: A decision was made to reconstruct the defect utilizing an interpolation axial flap and a staged reconstruction.  A telfa template was made of the defect.  This telfa template was then used to outline the paramedian forehead pedicle flap.  The donor area for the pedicle flap was then injected with anesthesia.  The flap was excised through the skin and subcutaneous tissue down to the layer of the underlying musculature.  The pedicle flap was carefully excised within this deep plane to maintain its blood supply.  The edges of the donor site were undermined.   The donor site was closed in a primary fashion.  The pedicle was then rotated into position and sutured.  Once the tube was sutured into place, adequate blood supply was confirmed with blanching and refill.  The pedicle was then wrapped with xeroform gauze and dressed appropriately with a telfa and gauze bandage to ensure continued blood supply and protect the attached pedicle.

## 2022-03-17 NOTE — PROGRESS NOTE ADULT - ASSESSMENT
56y with PMH of HTN , Covid in August of 2021, Status post Dev procedure for perforated diverticulitis in 9/21@ [facility] / discharged home with an extended course of antibiotic and required wound VAC placement which was removed . Pt noted with abdominal bulge, c/o abdominal pain/discomfort noted with abdominal wall hernia. Patient now s/p Dev reversal and repair of midline abdominal wall defect (3/15). Patient requiring pressor support secondary to administration of epidural bupivacaine. Pain now controlled and being weaned off ben.     PLAN:  - Continue to monitor wound vac output.   - Pain control as per Pain and primary team (pt complaining of PCEA).  * - FU left lower extremity weakness. Present since spinal epidural was placed.   - Encourage IS  - Nausea control PRN  - Monitor vitals  - Diet: IVF + NPO w sips  - Monitor I+Os  - OOB/ Ambulate/ PT  - DVT ppx  - Primary care per red team surgery    Green Team Surgery, [phone] 56y with PMH of HTN , Covid in August of 2021, Status post Dev procedure for perforated diverticulitis in 9/21@ [facility] / discharged home with an extended course of antibiotic and required wound VAC placement which was removed . Pt noted with abdominal bulge, c/o abdominal pain/discomfort noted with abdominal wall hernia. Patient now s/p Dev reversal and repair of midline abdominal wall defect (3/15). Patient requiring pressor support secondary to administration of epidural bupivacaine. Pain now controlled and being weaned off ben.     PLAN:  - Continue to monitor wound vac output.   vac change today, please send photo to green surgery team  - Pain control as per Pain and primary team (pt complaining of PCEA).  * - FU left lower extremity weakness. Present since spinal epidural was placed.   - Encourage IS  - Nausea control PRN  - Monitor vitals  - Diet: IVF + NPO w sips  - Monitor I+Os  - OOB/ Ambulate/ PT  - DVT ppx  - Primary care per red team surgery    Discussed with Dr. Bernard Montano Team Surgery, [phone] 56y with PMH of HTN , Covid in August of 2021, Status post Dev procedure for perforated diverticulitis in 9/21@ [facility] / discharged home with an extended course of antibiotic and required wound VAC placement which was removed . Pt noted with abdominal bulge, c/o abdominal pain/discomfort noted with abdominal wall hernia. Patient now s/p Dev reversal and repair of midline abdominal wall defect (3/15). Patient requiring pressor support secondary to administration of epidural bupivacaine. Pain now controlled and being weaned off ben.     PLAN:  - Continue to monitor wound vac output.   vac change today, please send photo to green surgery team  - Pain control as per Pain and primary team  pcea removed yesterday  - Appreciate rest of care per red team surgery    Discussed with Dr. Bernard Montano Team Surgery, [phone]

## 2022-03-17 NOTE — PROGRESS NOTE ADULT - SUBJECTIVE AND OBJECTIVE BOX
TEAM Surgery Progress Note    INTERVAL EVENTS: Patient is POD1. Ucath and epidural were removed today. Has not yet passed gas or stool. Overnight he complained of gas pain in his abdomen as well as abdominal distention. He was tachycardic to the 120s. He is using his PCA pump but says he needs more long term pain control. IV tylenol ordered. He is urinating adequately. Patient seen and discussed with senior resident. Will monitor overnight.    SUBJECTIVE: Patient seen and examined at bedside with surgical team      OBJECTIVE:    Vital Signs Last 24 Hrs  T(C): 36.8 (17 Mar 2022 00:34), Max: 37.1 (16 Mar 2022 16:13)  T(F): 98.2 (17 Mar 2022 00:34), Max: 98.8 (16 Mar 2022 16:13)  HR: 110 (17 Mar 2022 00:34) (83 - 120)  BP: 114/69 (17 Mar 2022 00:34) (88/52 - 130/82)  BP(mean): 70 (16 Mar 2022 02:15) (67 - 70)  RR: 18 (17 Mar 2022 00:34) (16 - 18)  SpO2: 93% (17 Mar 2022 00:34) (91% - 99%)I&O's Detail    15 Mar 2022 07:01  -  16 Mar 2022 07:00  --------------------------------------------------------  IN:    IV PiggyBack: 100 mL    Lactated Ringers: 1625 mL  Total IN: 1725 mL    OUT:    Indwelling Catheter - Urethral (mL): 690 mL  Total OUT: 690 mL    Total NET: 1035 mL      16 Mar 2022 07:01  -  17 Mar 2022 01:55  --------------------------------------------------------  IN:    Lactated Ringers: 1375 mL  Total IN: 1375 mL    OUT:    Indwelling Catheter - Urethral (mL): 1750 mL  Total OUT: 1750 mL    Total NET: -375 mL      MEDICATIONS  (STANDING):  bupivacaine  0.25%/epinephrine 1:[id number] Injectable 10 milliLiter(s) Local Injection once  dextrose 40% Gel 15 Gram(s) Oral once  fentaNYL    Injectable 100 MICROGram(s) IV Push once  glucagon  Injectable 1 milliGRAM(s) IntraMuscular once  glucagon  Injectable 1 milliGRAM(s) IntraMuscular once  heparin   Injectable 5000 Unit(s) SubCutaneous every 8 hours  HYDROmorphone PCA (1 mG/mL) 30 milliLiter(s) PCA Continuous PCA Continuous  insulin lispro (ADMELOG) corrective regimen sliding scale   SubCutaneous every 6 hours  insulin lispro (ADMELOG) corrective regimen sliding scale   SubCutaneous at bedtime  lactated ringers. 1000 milliLiter(s) (125 mL/Hr) IV Continuous <Continuous>    MEDICATIONS  (PRN):  HYDROmorphone PCA (1 mG/mL) Rescue Clinician Bolus 0.5 milliGRAM(s) IV Push every 15 minutes PRN for Pain Scale GREATER THAN 6  naloxone Injectable 0.1 milliGRAM(s) IV Push every 3 minutes PRN For ANY of the following changes in patient status:  A. RR LESS THAN 10 breaths per minute, B. Oxygen saturation LESS THAN 90%, C. Sedation score of 6  ondansetron Injectable 4 milliGRAM(s) IV Push every 6 hours PRN Nausea      Physical Exam:  General: NAD, resting comfortably in bed  Neuro: A/O x 3  Pulmonary: Nonlabored breathing, no respiratory distress  Abdominal: soft, nondistended, appropriate incisional tenderness, wound vac in place. No herniation or defect noted on abdominal wall.  Incision: C/D/I   Drains: Tello in place   Extremities: [facility]       LABS:                        11.6   8.88  )-----------( 231      ( 16 Mar 2022 05:18 )             36.4     03-16    141  |  109<H>  |  20  ----------------------------<  151<H>  5.0   |  21<L>  |  1.18    Ca    7.9<L>      16 Mar 2022 05:18  Phos  5.0     03-16  Mg     2.1     03-16      PT/INR - ( 16 Mar 2022 11:33 )   PT: 10.9 sec;   INR: 0.95 ratio         PTT - ( 16 Mar 2022 11:33 )  PTT:26.2 sec          IMAGING:

## 2022-03-17 NOTE — PROGRESS NOTE ADULT - ASSESSMENT
ASSESSMENT:56y with PMH of HTN , Covid in August of 2021, Status post Dev procedure for perforated diverticulitis in 9/21@ [facility] / discharged home with an extended course of antibiotic and required wound VAC placement which was removed . Pt noted with abdominal bulge, c/o abdominal pain/discomfort noted with abdominal wall hernia. Patient now s/p Dev reveral and repair of midline abdominal wall defect (3/15). Patient requiring pressor support secondary to administration of epidural bupivacaine. Pain now controlled and being weaned off ben.     PLAN:  - Pain control with PCA and IV pain meds  - FU bowel fx  - Encourage IS  - Nausea control PRN  - Monitor vitals  - Keep Tello, left u-cath removed 3/16  - Diet: IVF + NPO w sips  - Monitor I+Os  - OOB/ Ambulate  - DVT ppx     Red Team Surgery  [phone]

## 2022-03-18 ENCOUNTER — TRANSCRIPTION ENCOUNTER (OUTPATIENT)
Age: 57
End: 2022-03-18

## 2022-03-18 LAB
ANION GAP SERPL CALC-SCNC: 12 MMOL/L — SIGNIFICANT CHANGE UP (ref 5–17)
BUN SERPL-MCNC: 9 MG/DL — SIGNIFICANT CHANGE UP (ref 7–23)
CALCIUM SERPL-MCNC: 8.7 MG/DL — SIGNIFICANT CHANGE UP (ref 8.4–10.5)
CHLORIDE SERPL-SCNC: 100 MMOL/L — SIGNIFICANT CHANGE UP (ref 96–108)
CO2 SERPL-SCNC: 24 MMOL/L — SIGNIFICANT CHANGE UP (ref 22–31)
CREAT SERPL-MCNC: 0.75 MG/DL — SIGNIFICANT CHANGE UP (ref 0.5–1.3)
EGFR: 105 ML/MIN/1.73M2 — SIGNIFICANT CHANGE UP
GLUCOSE BLDC GLUCOMTR-MCNC: 126 MG/DL — HIGH (ref 70–99)
GLUCOSE BLDC GLUCOMTR-MCNC: 148 MG/DL — HIGH (ref 70–99)
GLUCOSE BLDC GLUCOMTR-MCNC: 185 MG/DL — HIGH (ref 70–99)
GLUCOSE SERPL-MCNC: 160 MG/DL — HIGH (ref 70–99)
HCT VFR BLD CALC: 35.8 % — LOW (ref 39–50)
HGB BLD-MCNC: 11.1 G/DL — LOW (ref 13–17)
MAGNESIUM SERPL-MCNC: 2 MG/DL — SIGNIFICANT CHANGE UP (ref 1.6–2.6)
MCHC RBC-ENTMCNC: 30.2 PG — SIGNIFICANT CHANGE UP (ref 27–34)
MCHC RBC-ENTMCNC: 31 GM/DL — LOW (ref 32–36)
MCV RBC AUTO: 97.3 FL — SIGNIFICANT CHANGE UP (ref 80–100)
NRBC # BLD: 0 /100 WBCS — SIGNIFICANT CHANGE UP (ref 0–0)
PHOSPHATE SERPL-MCNC: 1.8 MG/DL — LOW (ref 2.5–4.5)
PHOSPHATE SERPL-MCNC: 2.7 MG/DL — SIGNIFICANT CHANGE UP (ref 2.5–4.5)
PLATELET # BLD AUTO: 235 K/UL — SIGNIFICANT CHANGE UP (ref 150–400)
POTASSIUM SERPL-MCNC: 4.1 MMOL/L — SIGNIFICANT CHANGE UP (ref 3.5–5.3)
POTASSIUM SERPL-SCNC: 4.1 MMOL/L — SIGNIFICANT CHANGE UP (ref 3.5–5.3)
RBC # BLD: 3.68 M/UL — LOW (ref 4.2–5.8)
RBC # FLD: 13.4 % — SIGNIFICANT CHANGE UP (ref 10.3–14.5)
SODIUM SERPL-SCNC: 136 MMOL/L — SIGNIFICANT CHANGE UP (ref 135–145)
WBC # BLD: 7.86 K/UL — SIGNIFICANT CHANGE UP (ref 3.8–10.5)
WBC # FLD AUTO: 7.86 K/UL — SIGNIFICANT CHANGE UP (ref 3.8–10.5)

## 2022-03-18 PROCEDURE — 93010 ELECTROCARDIOGRAM REPORT: CPT

## 2022-03-18 RX ORDER — SIMETHICONE 80 MG/1
80 TABLET, CHEWABLE ORAL ONCE
Refills: 0 | Status: COMPLETED | OUTPATIENT
Start: 2022-03-18 | End: 2022-03-18

## 2022-03-18 RX ORDER — PANTOPRAZOLE SODIUM 20 MG/1
40 TABLET, DELAYED RELEASE ORAL DAILY
Refills: 0 | Status: DISCONTINUED | OUTPATIENT
Start: 2022-03-18 | End: 2022-03-23

## 2022-03-18 RX ADMIN — HEPARIN SODIUM 5000 UNIT(S): 5000 INJECTION INTRAVENOUS; SUBCUTANEOUS at 05:45

## 2022-03-18 RX ADMIN — Medication 83.33 MILLIMOLE(S): at 10:11

## 2022-03-18 RX ADMIN — HEPARIN SODIUM 5000 UNIT(S): 5000 INJECTION INTRAVENOUS; SUBCUTANEOUS at 21:11

## 2022-03-18 RX ADMIN — Medication 1: at 05:49

## 2022-03-18 RX ADMIN — DEXTROSE MONOHYDRATE, SODIUM CHLORIDE, AND POTASSIUM CHLORIDE 125 MILLILITER(S): 50; .745; 4.5 INJECTION, SOLUTION INTRAVENOUS at 05:44

## 2022-03-18 RX ADMIN — Medication 400 MILLIGRAM(S): at 13:22

## 2022-03-18 RX ADMIN — Medication 400 MILLIGRAM(S): at 05:44

## 2022-03-18 RX ADMIN — Medication 62.5 MILLIMOLE(S): at 22:21

## 2022-03-18 RX ADMIN — PANTOPRAZOLE SODIUM 40 MILLIGRAM(S): 20 TABLET, DELAYED RELEASE ORAL at 13:15

## 2022-03-18 RX ADMIN — HYDROMORPHONE HYDROCHLORIDE 30 MILLILITER(S): 2 INJECTION INTRAMUSCULAR; INTRAVENOUS; SUBCUTANEOUS at 19:04

## 2022-03-18 RX ADMIN — Medication 1000 MILLIGRAM(S): at 06:30

## 2022-03-18 RX ADMIN — Medication 400 MILLIGRAM(S): at 17:29

## 2022-03-18 RX ADMIN — DEXTROSE MONOHYDRATE, SODIUM CHLORIDE, AND POTASSIUM CHLORIDE 125 MILLILITER(S): 50; .745; 4.5 INJECTION, SOLUTION INTRAVENOUS at 13:16

## 2022-03-18 RX ADMIN — HEPARIN SODIUM 5000 UNIT(S): 5000 INJECTION INTRAVENOUS; SUBCUTANEOUS at 13:15

## 2022-03-18 RX ADMIN — HYDROMORPHONE HYDROCHLORIDE 30 MILLILITER(S): 2 INJECTION INTRAMUSCULAR; INTRAVENOUS; SUBCUTANEOUS at 07:15

## 2022-03-18 RX ADMIN — HYDROMORPHONE HYDROCHLORIDE 0.5 MILLIGRAM(S): 2 INJECTION INTRAMUSCULAR; INTRAVENOUS; SUBCUTANEOUS at 04:30

## 2022-03-18 RX ADMIN — SIMETHICONE 80 MILLIGRAM(S): 80 TABLET, CHEWABLE ORAL at 02:17

## 2022-03-18 RX ADMIN — HYDROMORPHONE HYDROCHLORIDE 30 MILLILITER(S): 2 INJECTION INTRAMUSCULAR; INTRAVENOUS; SUBCUTANEOUS at 06:35

## 2022-03-18 NOTE — PROGRESS NOTE ADULT - ASSESSMENT
ASSESSMENT:56y with PMH of HTN , Covid in August of 2021, Status post Dev procedure for perforated diverticulitis in 9/21@ [facility] / discharged home with an extended course of antibiotic and required wound VAC placement which was removed . Pt noted with abdominal bulge, c/o abdominal pain/discomfort noted with abdominal wall hernia. Patient now s/p Dev reveral and repair of midline abdominal wall defect (3/15). Patient requiring pressor support secondary to administration of epidural bupivacaine. Pain now controlled and being weaned off ben.     PLAN:  - Pain control with PCA and IV pain meds  - FU bowel fx  - Encourage IS  - Nausea control PRN  - Monitor vitals  - Diet: IVF + NPO w sips  - Monitor I+Os  - OOB/ Ambulate  - DVT ppx  - PT: no skilled needs     Red Team Surgery  [id number]   ASSESSMENT:56y with PMH of HTN , Covid in August of 2021, Status post Dev procedure for perforated diverticulitis in 9/21@ [facility] / discharged home with an extended course of antibiotic and required wound VAC placement which was removed . Pt noted with abdominal bulge, c/o abdominal pain/discomfort noted with abdominal wall hernia. Patient now s/p Dev reversal and repair of midline abdominal wall defect (3/15). Patient requiring pressor support secondary to administration of epidural bupivacaine. Pain now controlled and being weaned off ben.     PLAN:  - Pain control with PCA and IV pain meds  - FU bowel fx  - Encourage IS  - Nausea control PRN  - Monitor vitals  - Diet: IVF + NPO w sips  - Monitor I+Os  - OOB/ Ambulate  - DVT ppx  - PT: no skilled needs     Red Team Surgery  [id number]   ASSESSMENT:56y with PMH of HTN , Covid in August of 2021, Status post Dev procedure for perforated diverticulitis in 9/21@ [facility] / discharged home with an extended course of antibiotic and required wound VAC placement which was removed . Pt noted with abdominal bulge, c/o abdominal pain/discomfort noted with abdominal wall hernia. Patient now s/p Dev reversal and repair of midline abdominal wall defect (3/15). Patient requiring pressor support secondary to administration of epidural bupivacaine. Pain now controlled and being weaned off ben.     PLAN:  - Pain control with PCA and IV pain meds  - FU bowel fx; consider abdominal xray to evaluate for ileus  - Encourage IS  - Nausea control PRN  - Monitor vitals  - Diet: IVF + NPO w sips  - Monitor I+Os  - OOB/ Ambulate  - DVT ppx  - PT: no skilled needs     Red Team Surgery  [id number]   ASSESSMENT:56y with PMH of HTN , Covid in August of 2021, Status post Dev procedure for perforated diverticulitis in 9/21@ [facility] / discharged home with an extended course of antibiotic and required wound VAC placement which was removed . Pt noted with abdominal bulge, c/o abdominal pain/discomfort noted with abdominal wall hernia. Patient now s/p Dev reversal and repair of midline abdominal wall defect (3/15). Patient requiring pressor support secondary to administration of epidural bupivacaine. Pain now controlled and being weaned off ben.     PLAN:    - Pain control with PCA and IV pain meds  - AROBF  - Encourage IS  - Nausea control PRN  - Monitor vitals  - Diet: IVF + NPO w sips  - Monitor I+Os  - OOB/ Ambulate  - DVT ppx  - PT: no skilled needs     Red Team Surgery  [id number]   ASSESSMENT:56y with PMH of HTN , Covid in August of 2021, Status post Dev procedure for perforated diverticulitis in 9/21@ [facility] / discharged home with an extended course of antibiotic and required wound VAC placement which was removed . Pt noted with abdominal bulge, c/o abdominal pain/discomfort noted with abdominal wall hernia. Patient now s/p Dev reversal and repair of midline abdominal wall defect (3/15). Patient requiring pressor support secondary to administration of epidural bupivacaine. Pain now controlled and being weaned off ben.     PLAN:    - Pain control with PCA and IV pain meds  - AROBF  - Encourage IS  - Nausea control PRN, Low threshold to place NGT if patient has nausea and monitor for nausea/vomiting  - Monitor vitals  - Diet: IVF + NPO w sips  - Monitor I+Os  - OOB/ Ambulate  - DVT ppx  - PT: no skilled needs     Red Team Surgery  [id number]

## 2022-03-18 NOTE — DISCHARGE NOTE PROVIDER - NSDCFUADDINST_GEN_ALL_CORE_FT
Please take tylenol as needed for mild pain. You may take oxycodone 2.5mg (1/2 tab) as needed every 4-6 hours for moderate breakthrough pain or oxycodone 5mg (1 tab) every 4-6 hours as needed for severe breakthrough pain.    Taking narcotic pain medication may cause constipation, nausea, fatigue, dizziness, and itchiness- these are the most common side effects.     You can take over the counter stool softeners ie. Senna or Miralax to prevent constipation. Please do not take stool softeners if you are having soft/loose bowel movements.     Do not drive, operate machinery, or make important decisions if taking narcotic pain medications.    Notify your physician/surgeon and return to ER for difficulty breathing, shortness of breath, for temperatures greater than 101, pain not controlled with pain medications, persistent nausea and vomiting, or acutely concerning matters to you, that may require urgent medical attention.  Please take tylenol alternating with motrin as needed for mild pain. It is most effective to alternate these two medicines with each other. You may take oxycodone 2.5mg (1/2 tab) as needed every 4-6 hours for moderate breakthrough pain or oxycodone 5mg (1 tab) every 4-6 hours as needed for severe breakthrough pain.    Taking narcotic pain medication may cause constipation, nausea, fatigue, dizziness, and itchiness- these are the most common side effects.     You can take over the counter stool softeners ie. Senna or Miralax to prevent constipation. Please do not take stool softeners if you are having soft/loose bowel movements.     Do not drive, operate machinery, or make important decisions if taking narcotic pain medications.    Notify your physician/surgeon and return to ER for difficulty breathing, shortness of breath, for temperatures greater than 101, pain not controlled with pain medications, persistent nausea and vomiting, or acutely concerning matters to you, that may require urgent medical attention.     Taking narcotic pain medication may cause constipation, nausea, fatigue, dizziness, and itchiness- these are the most common side effects.     You can take over the counter stool softeners ie. Senna or Miralax to prevent constipation. Please do not take stool softeners if you are having soft/loose bowel movements.     Do not drive, operate machinery, or make important decisions if taking narcotic pain medications.    Notify your physician/surgeon and return to ER for difficulty breathing, shortness of breath, for temperatures greater than 101, pain not controlled with pain medications, persistent nausea and vomiting, or acutely concerning matters to you, that may require urgent medical attention.  Please take tylenol alternating with motrin (ibuprofen) as needed for mild pain. It is most effective to alternate these two medicines with each other. You may take oxycodone 2.5mg (1/2 tab) as needed every 4-6 hours for moderate breakthrough pain or oxycodone 5mg (1 tab) every 4-6 hours as needed for severe breakthrough pain.    Taking narcotic pain medication may cause constipation, nausea, fatigue, dizziness, and itchiness- these are the most common side effects.     You can take over the counter stool softeners ie. Senna or Miralax to prevent constipation. Please do not take stool softeners if you are having soft/loose bowel movements.     Do not drive, operate machinery, or make important decisions if taking narcotic pain medications.    Notify your physician/surgeon and return to ER for difficulty breathing, shortness of breath, for temperatures greater than 101, pain not controlled with pain medications, persistent nausea and vomiting, or acutely concerning matters to you, that may require urgent medical attention.

## 2022-03-18 NOTE — PROGRESS NOTE ADULT - SUBJECTIVE AND OBJECTIVE BOX
TEAM Surgery Progress Note    INTERVAL EVENTS: Patient has not yet passed gas or stool. Passed TOV during day. PCA dose increased today. No acute events overnight.  SUBJECTIVE: Patient seen and examined at bedside with surgical team        OBJECTIVE:    Vital Signs Last 24 Hrs  T(C): 36.7 (17 Mar 2022 23:51), Max: 37.9 (17 Mar 2022 13:05)  T(F): 98.1 (17 Mar 2022 23:51), Max: 100.2 (17 Mar 2022 13:05)  HR: 105 (17 Mar 2022 23:51) (93 - 105)  BP: 148/82 (17 Mar 2022 23:51) (103/69 - 150/78)  BP(mean): --  RR: 18 (17 Mar 2022 23:51) (18 - 18)  SpO2: 95% (17 Mar 2022 23:51) (93% - 97%)I&O's Detail    16 Mar 2022 07:01  -  17 Mar 2022 07:00  --------------------------------------------------------  IN:    IV PiggyBack: 200 mL    Lactated Ringers: 2875 mL  Total IN: 3075 mL    OUT:    Indwelling Catheter - Urethral (mL): 2400 mL  Total OUT: 2400 mL    Total NET: 675 mL      17 Mar 2022 07:01  -  18 Mar 2022 00:59  --------------------------------------------------------  IN:  Total IN: 0 mL    OUT:    Indwelling Catheter - Urethral (mL): 300 mL    Oral Fluid: 0 mL    Voided (mL): 1200 mL  Total OUT: 1500 mL    Total NET: -1500 mL      MEDICATIONS  (STANDING):  acetaminophen   IVPB .. 1000 milliGRAM(s) IV Intermittent every 6 hours  bupivacaine  0.25%/epinephrine [id number] Injectable 10 milliLiter(s) Local Injection once  dextrose 40% Gel 15 Gram(s) Oral once  dextrose 5% + sodium chloride 0.45% with potassium chloride 20 mEq/L 1000 milliLiter(s) (125 mL/Hr) IV Continuous <Continuous>  glucagon  Injectable 1 milliGRAM(s) IntraMuscular once  glucagon  Injectable 1 milliGRAM(s) IntraMuscular once  heparin   Injectable 5000 Unit(s) SubCutaneous every 8 hours  HYDROmorphone PCA (1 mG/mL) 30 milliLiter(s) PCA Continuous PCA Continuous  insulin lispro (ADMELOG) corrective regimen sliding scale   SubCutaneous every 6 hours    MEDICATIONS  (PRN):  cyclobenzaprine 5 milliGRAM(s) Oral three times a day PRN Muscle Spasm  HYDROmorphone PCA (1 mG/mL) Rescue Clinician Bolus 0.5 milliGRAM(s) IV Push every 15 minutes PRN for Pain Scale GREATER THAN 6  naloxone Injectable 0.1 milliGRAM(s) IV Push every 3 minutes PRN For ANY of the following changes in patient status:  A. RR LESS THAN 10 breaths per minute, B. Oxygen saturation LESS THAN 90%, C. Sedation score of 6  ondansetron Injectable 4 milliGRAM(s) IV Push every 6 hours PRN Nausea        Physical Exam:  General: NAD, resting comfortably in bed  Neuro: A/O x 3  Pulmonary: Nonlabored breathing, no respiratory distress  Abdominal: soft, nondistended, appropriate incisional tenderness, wound vac in place. No herniation or defect noted on abdominal wall.  Incision: C/D/I   [facility]remities: [facility]     LABS:                        11.5   7.12  )-----------( 215      ( 17 Mar 2022 07:24 )             37.2     03-17    141  |  103  |  13  ----------------------------<  138<H>  4.1   |  25  |  0.90    Ca    9.0      17 Mar 2022 07:25  Phos  2.3     03-17  Mg     2.4     03-17      PT/INR - ( 16 Mar 2022 11:33 )   PT: 10.9 sec;   INR: 0.95 ratio         PTT - ( 16 Mar 2022 11:33 )  PTT:26.2 sec          IMAGING:     TEAM Surgery Progress Note    INTERVAL EVENTS: Patient has not yet passed gas or stool. Passed TOV during day. PCA dose increased today. Patient complaining of some mild gas pain. Ordered simethicone.  SUBJECTIVE: Patient seen and examined at bedside with surgical team        OBJECTIVE:    Vital Signs Last 24 Hrs  T(C): 36.7 (17 Mar 2022 23:51), Max: 37.9 (17 Mar 2022 13:05)  T(F): 98.1 (17 Mar 2022 23:51), Max: 100.2 (17 Mar 2022 13:05)  HR: 105 (17 Mar 2022 23:51) (93 - 105)  BP: 148/82 (17 Mar 2022 23:51) (103/69 - 150/78)  BP(mean): --  RR: 18 (17 Mar 2022 23:51) (18 - 18)  SpO2: 95% (17 Mar 2022 23:51) (93% - 97%)I&O's Detail    16 Mar 2022 07:01  -  17 Mar 2022 07:00  --------------------------------------------------------  IN:    IV PiggyBack: 200 mL    Lactated Ringers: 2875 mL  Total IN: 3075 mL    OUT:    Indwelling Catheter - Urethral (mL): 2400 mL  Total OUT: 2400 mL    Total NET: 675 mL      17 Mar 2022 07:01  -  18 Mar 2022 00:59  --------------------------------------------------------  IN:  Total IN: 0 mL    OUT:    Indwelling Catheter - Urethral (mL): 300 mL    Oral Fluid: 0 mL    Voided (mL): 1200 mL  Total OUT: 1500 mL    Total NET: -1500 mL      MEDICATIONS  (STANDING):  acetaminophen   IVPB .. 1000 milliGRAM(s) IV Intermittent every 6 hours  bupivacaine  0.25%/epinephrine [id number] Injectable 10 milliLiter(s) Local Injection once  dextrose 40% Gel 15 Gram(s) Oral once  dextrose 5% + sodium chloride 0.45% with potassium chloride 20 mEq/L 1000 milliLiter(s) (125 mL/Hr) IV Continuous <Continuous>  glucagon  Injectable 1 milliGRAM(s) IntraMuscular once  glucagon  Injectable 1 milliGRAM(s) IntraMuscular once  heparin   Injectable 5000 Unit(s) SubCutaneous every 8 hours  HYDROmorphone PCA (1 mG/mL) 30 milliLiter(s) PCA Continuous PCA Continuous  insulin lispro (ADMELOG) corrective regimen sliding scale   SubCutaneous every 6 hours    MEDICATIONS  (PRN):  cyclobenzaprine 5 milliGRAM(s) Oral three times a day PRN Muscle Spasm  HYDROmorphone PCA (1 mG/mL) Rescue Clinician Bolus 0.5 milliGRAM(s) IV Push every 15 minutes PRN for Pain Scale GREATER THAN 6  naloxone Injectable 0.1 milliGRAM(s) IV Push every 3 minutes PRN For ANY of the following changes in patient status:  A. RR LESS THAN 10 breaths per minute, B. Oxygen saturation LESS THAN 90%, C. Sedation score of 6  ondansetron Injectable 4 milliGRAM(s) IV Push every 6 hours PRN Nausea        Physical Exam:  General: NAD, resting comfortably in bed  Neuro: A/O x 3  Pulmonary: Nonlabored breathing, no respiratory distress  Abdominal: soft, nondistended, appropriate incisional tenderness, wound vac in place. No herniation or defect noted on abdominal wall.  Incision: C/D/I   Extremities: P     LABS:                        11.5   7.12  )-----------( 215      ( 17 Mar 2022 07:24 )             37.2     03-17    141  |  103  |  13  ----------------------------<  138<H>  4.1   |  25  |  0.90    Ca    9.0      17 Mar 2022 07:25  Phos  2.3     03-17  Mg     2.4     03-17      PT/INR - ( 16 Mar 2022 11:33 )   PT: 10.9 sec;   INR: 0.95 ratio         PTT - ( 16 Mar 2022 11:33 )  PTT:26.2 sec          IMAGING:     TEAM Surgery Progress Note    INTERVAL EVENTS: Passed TOV during day. PCA dose increased today. Patient complaining of gas pain and increased abdominal distention. He is normotensive but tachycardic to the 105-110s, associated with his pain. No complaints of N/V. He is soft but moderately distended. Has not been using his PCA pump. Encouraged him to use the pump to help control his pain. Patient has not yet passed gas or stool. Patient discussed with senior surgical resident.    SUBJECTIVE:         OBJECTIVE:    Vital Signs Last 24 Hrs  T(C): 36.7 (17 Mar 2022 23:51), Max: 37.9 (17 Mar 2022 13:05)  T(F): 98.1 (17 Mar 2022 23:51), Max: 100.2 (17 Mar 2022 13:05)  HR: 105 (17 Mar 2022 23:51) (93 - 105)  BP: 148/82 (17 Mar 2022 23:51) (103/69 - 150/78)  BP(mean): --  RR: 18 (17 Mar 2022 23:51) (18 - 18)  SpO2: 95% (17 Mar 2022 23:51) (93% - 97%)I&O's Detail    16 Mar 2022 07:01  -  17 Mar 2022 07:00  --------------------------------------------------------  IN:    IV PiggyBack: 200 mL    Lactated Ringers: 2875 mL  Total IN: 3075 mL    OUT:    Indwelling Catheter - Urethral (mL): 2400 mL  Total OUT: 2400 mL    Total NET: 675 mL      17 Mar 2022 07:01  -  18 Mar 2022 00:59  --------------------------------------------------------  IN:  Total IN: 0 mL    OUT:    Indwelling Catheter - Urethral (mL): 300 mL    Oral Fluid: 0 mL    Voided (mL): 1200 mL  Total OUT: 1500 mL    Total NET: -1500 mL      MEDICATIONS  (STANDING):  acetaminophen   IVPB .. 1000 milliGRAM(s) IV Intermittent every 6 hours  bupivacaine  0.25%/epinephrine 1:[id number] Injectable 10 milliLiter(s) Local Injection once  dextrose 40% Gel 15 Gram(s) Oral once  dextrose 5% + sodium chloride 0.45% with potassium chloride 20 mEq/L 1000 milliLiter(s) (125 mL/Hr) IV Continuous <Continuous>  glucagon  Injectable 1 milliGRAM(s) IntraMuscular once  glucagon  Injectable 1 milliGRAM(s) IntraMuscular once  heparin   Injectable 5000 Unit(s) SubCutaneous every 8 hours  HYDROmorphone PCA (1 mG/mL) 30 milliLiter(s) PCA Continuous PCA Continuous  insulin lispro (ADMELOG) corrective regimen sliding scale   SubCutaneous every 6 hours    MEDICATIONS  (PRN):  cyclobenzaprine 5 milliGRAM(s) Oral three times a day PRN Muscle Spasm  HYDROmorphone PCA (1 mG/mL) Rescue Clinician Bolus 0.5 milliGRAM(s) IV Push every 15 minutes PRN for Pain Scale GREATER THAN 6  naloxone Injectable 0.1 milliGRAM(s) IV Push every 3 minutes PRN For ANY of the following changes in patient status:  A. RR LESS THAN 10 breaths per minute, B. Oxygen saturation LESS THAN 90%, C. Sedation score of 6  ondansetron Injectable 4 milliGRAM(s) IV Push every 6 hours PRN Nausea        Physical Exam:  General: NAD, resting comfortably in bed  Neuro: A/O x 3  Pulmonary: Nonlabored breathing, no respiratory distress  Abdominal: soft, moderately distended, appropriate incisional tenderness, wound vac in place. No herniation or defect noted on abdominal wall.  Incision: C/D/I   Extremities: [facility]     LABS:                        11.5   7.12  )-----------( 215      ( 17 Mar 2022 07:24 )             37.2     03-17    141  |  103  |  13  ----------------------------<  138<H>  4.1   |  25  |  0.90    Ca    9.0      17 Mar 2022 07:25  Phos  2.3     03-17  Mg     2.4     03-17      PT/INR - ( 16 Mar 2022 11:33 )   PT: 10.9 sec;   INR: 0.95 ratio         PTT - ( 16 Mar 2022 11:33 )  PTT:26.2 sec          IMAGING:     TEAM Surgery Progress Note    INTERVAL EVENTS: Passed TOV during day. PCA dose increased today. Patient complaining of gas pain and increased abdominal distention. He is normotensive but tachycardic to the 105-110s, associated with his pain. No complaints of N/V. He severely distended but denies discomfort. Has not been using his PCA pump. Encouraged him to use the pump to help control his pain. Patient has not yet passed gas or stool. Patient discussed with senior surgical resident.    SUBJECTIVE:         OBJECTIVE:    Vital Signs Last 24 Hrs  T(C): 36.7 (17 Mar 2022 23:51), Max: 37.9 (17 Mar 2022 13:05)  T(F): 98.1 (17 Mar 2022 23:51), Max: 100.2 (17 Mar 2022 13:05)  HR: 105 (17 Mar 2022 23:51) (93 - 105)  BP: 148/82 (17 Mar 2022 23:51) (103/69 - 150/78)  BP(mean): --  RR: 18 (17 Mar 2022 23:51) (18 - 18)  SpO2: 95% (17 Mar 2022 23:51) (93% - 97%)I&O's Detail    16 Mar 2022 07:01  -  17 Mar 2022 07:00  --------------------------------------------------------  IN:    IV PiggyBack: 200 mL    Lactated Ringers: 2875 mL  Total IN: 3075 mL    OUT:    Indwelling Catheter - Urethral (mL): 2400 mL  Total OUT: 2400 mL    Total NET: 675 mL      17 Mar 2022 07:01  -  18 Mar 2022 00:59  --------------------------------------------------------  IN:  Total IN: 0 mL    OUT:    Indwelling Catheter - Urethral (mL): 300 mL    Oral Fluid: 0 mL    Voided (mL): 1200 mL  Total OUT: 1500 mL    Total NET: -1500 mL      MEDICATIONS  (STANDING):  acetaminophen   IVPB .. 1000 milliGRAM(s) IV Intermittent every 6 hours  bupivacaine  0.25%/epinephrine 1:[id number] Injectable 10 milliLiter(s) Local Injection once  dextrose 40% Gel 15 Gram(s) Oral once  dextrose 5% + sodium chloride 0.45% with potassium chloride 20 mEq/L 1000 milliLiter(s) (125 mL/Hr) IV Continuous <Continuous>  glucagon  Injectable 1 milliGRAM(s) IntraMuscular once  glucagon  Injectable 1 milliGRAM(s) IntraMuscular once  heparin   Injectable 5000 Unit(s) SubCutaneous every 8 hours  HYDROmorphone PCA (1 mG/mL) 30 milliLiter(s) PCA Continuous PCA Continuous  insulin lispro (ADMELOG) corrective regimen sliding scale   SubCutaneous every 6 hours    MEDICATIONS  (PRN):  cyclobenzaprine 5 milliGRAM(s) Oral three times a day PRN Muscle Spasm  HYDROmorphone PCA (1 mG/mL) Rescue Clinician Bolus 0.5 milliGRAM(s) IV Push every 15 minutes PRN for Pain Scale GREATER THAN 6  naloxone Injectable 0.1 milliGRAM(s) IV Push every 3 minutes PRN For ANY of the following changes in patient status:  A. RR LESS THAN 10 breaths per minute, B. Oxygen saturation LESS THAN 90%, C. Sedation score of 6  ondansetron Injectable 4 milliGRAM(s) IV Push every 6 hours PRN Nausea        Physical Exam:  General: NAD, resting comfortably in bed  Neuro: A/O x 3  Pulmonary: Nonlabored breathing, no respiratory distress  Abdominal: soft, moderately distended, appropriate incisional tenderness, wound vac in place. No herniation or defect noted on abdominal wall.  Incision: C/D/I   Extremities: [facility]     LABS:                        11.5   7.12  )-----------( 215      ( 17 Mar 2022 07:24 )             37.2     03-17    141  |  103  |  13  ----------------------------<  138<H>  4.1   |  25  |  0.90    Ca    9.0      17 Mar 2022 07:25  Phos  2.3     03-17  Mg     2.4     03-17      PT/INR - ( 16 Mar 2022 11:33 )   PT: 10.9 sec;   INR: 0.95 ratio         PTT - ( 16 Mar 2022 11:33 )  PTT:26.2 sec          IMAGING:     TEAM Surgery Progress Note    INTERVAL EVENTS: Passed TOV during day. PCA dose increased today. Patient complaining of gas pain and increased abdominal distention. He is normotensive but tachycardic to the 105-110s, associated with his pain. No complaints of N/V. He severely distended but denies discomfort. Has not been using his PCA pump. Encouraged him to use the pump to help control his pain. Patient has not yet passed gas or stool. Patient discussed with senior surgical resident.    SUBJECTIVE:   Patient was seen and examined at bedside. Not passing flatus and has not had any bowel movements. Denies nausea or vomiting at the time of examination.        OBJECTIVE:     Vital Signs Last 24 Hrs  T(C): 36.4 (18 Mar 2022 09:45), Max: 37.9 (17 Mar 2022 13:05)  T(F): 97.6 (18 Mar 2022 09:45), Max: 100.3 (18 Mar 2022 02:32)  HR: 90 (18 Mar 2022 09:45) (90 - 110)  BP: 129/86 (18 Mar 2022 09:45) (103/69 - 148/82)  BP(mean): --  RR: 18 (18 Mar 2022 09:45) (18 - 18)  SpO2: 92% (18 Mar 2022 09:45) (92% - 97%)    I&O's Detail    17 Mar 2022 07:01  -  18 Mar 2022 07:00  --------------------------------------------------------  IN:    dextrose 5% + sodium chloride 0.45% w/ Additives: 1500 mL  Total IN: 1500 mL    OUT:    Indwelling Catheter - Urethral (mL): 300 mL    Oral Fluid: 0 mL    Voided (mL): 1500 mL  Total OUT: 1800 mL    Total NET: -300 mL      18 Mar 2022 07:01  -  18 Mar 2022 11:08  --------------------------------------------------------  IN:    dextrose 5% + sodium chloride 0.45% w/ Additives: 500 mL    IV PiggyBack: 500 mL  Total IN: 1000 mL    OUT:    Oral Fluid: 0 mL    Voided (mL): 175 mL  Total OUT: 175 mL    Total NET: 825 mL          MEDICATIONS  (STANDING):  acetaminophen   IVPB .. 1000 milliGRAM(s) IV Intermittent every 6 hours  bupivacaine  0.25%/epinephrine 1:[id number] Injectable 10 milliLiter(s) Local Injection once  dextrose 40% Gel 15 Gram(s) Oral once  dextrose 5% + sodium chloride 0.45% with potassium chloride 20 mEq/L 1000 milliLiter(s) (125 mL/Hr) IV Continuous <Continuous>  glucagon  Injectable 1 milliGRAM(s) IntraMuscular once  glucagon  Injectable 1 milliGRAM(s) IntraMuscular once  heparin   Injectable 5000 Unit(s) SubCutaneous every 8 hours  HYDROmorphone PCA (1 mG/mL) 30 milliLiter(s) PCA Continuous PCA Continuous  insulin lispro (ADMELOG) corrective regimen sliding scale   SubCutaneous every 6 hours    MEDICATIONS  (PRN):  cyclobenzaprine 5 milliGRAM(s) Oral three times a day PRN Muscle Spasm  HYDROmorphone PCA (1 mG/mL) Rescue Clinician Bolus 0.5 milliGRAM(s) IV Push every 15 minutes PRN for Pain Scale GREATER THAN 6  naloxone Injectable 0.1 milliGRAM(s) IV Push every 3 minutes PRN For ANY of the following changes in patient status:  A. RR LESS THAN 10 breaths per minute, B. Oxygen saturation LESS THAN 90%, C. Sedation score of 6  ondansetron Injectable 4 milliGRAM(s) IV Push every 6 hours PRN Nausea        Physical Exam:  General: NAD, resting comfortably in bed  Neuro: A/O x 3  Pulmonary: Nonlabored breathing, no respiratory distress  Abdominal: soft, moderately distended, appropriate incisional tenderness, wound vac in place. No herniation or defect noted on abdominal wall.  Incision: C/D/I   Extremities: [facility]     LABS:                        11.5   7.12  )-----------( 215      ( 17 Mar 2022 07:24 )             37.2     03-17    141  |  103  |  13  ----------------------------<  138<H>  4.1   |  25  |  0.90    Ca    9.0      17 Mar 2022 07:25  Phos  2.3     03-17  Mg     2.4     03-17      PT/INR - ( 16 Mar 2022 11:33 )   PT: 10.9 sec;   INR: 0.95 ratio         PTT - ( 16 Mar 2022 11:33 )  PTT:26.2 sec          IMAGING:

## 2022-03-18 NOTE — DISCHARGE NOTE PROVIDER - NSDCCPTREATMENT_GEN_ALL_CORE_FT
PRINCIPAL PROCEDURE  Procedure: Reversal, Dev procedure  Findings and Treatment:       SECONDARY PROCEDURE  Procedure: Repair, hernia, incisional, reducible, recurrent  Findings and Treatment:      PRINCIPAL PROCEDURE  Procedure: Reversal, Dev procedure  Findings and Treatment: WOUND CARE: *********  Continue wound vac as per vac team/home VNS instructions; to be changed 3x/week  BATHING: You may shower daily. Let warm soapy water run over incisions in shower. Do not scrub incisions directly. Pat dry. Do not take tub baths or submerge your incisions in water for 4 weeks to ensure proper healing.  ACTIVITY: No heavy lifting anything more than 10-15lbs (about the weight of a gallon of milk) or straining. Avoid strenuous activity until cleared by your surgeon. If you are taking narcotic pain medication (such as oxycodone), do NOT drive a car, operate machinery or make important decisions.  DIET: low fiber diet carbohydrate consistent  NOTIFY YOUR SURGEON IF: You have any bleeding that does not stop, any pus draining from your wound, any fever (over 100.4 F) or chills, persistent nausea/vomiting with inability to tolerate food or liquids, persistent diarrhea, or if your pain is not controlled on your discharge pain medications.  FOLLOW-UP:  - Please call to make a follow-up appointment within 1-2 weeks of discharge  with your surgeons Dr Campos and Dr Wagner  - Please follow up with your primary care physician in 1-2 weeks regarding your hospitalization      SECONDARY PROCEDURE  Procedure: Repair, hernia, incisional, reducible, recurrent  Findings and Treatment:      PRINCIPAL PROCEDURE  Procedure: Reversal, Dev procedure  Findings and Treatment: WOUND CARE: Continue wound vac as per vac team/home VNS instructions; to be changed 3x/week  BATHING: You may shower daily. Let warm soapy water run over incisions in shower. Do not scrub incisions directly. Pat dry. Do not take tub baths or submerge your incisions in water for 4 weeks to ensure proper healing.  ACTIVITY: No heavy lifting anything more than 10-15lbs (about the weight of a gallon of milk) or straining. Avoid strenuous activity until cleared by your surgeon. If you are taking narcotic pain medication (such as oxycodone), do NOT drive a car, operate machinery or make important decisions.  DIET: low fiber diet carbohydrate consistent  NOTIFY YOUR SURGEON IF: You have any bleeding that does not stop, any pus draining from your wound, any fever (over 100.4 F) or chills, persistent nausea/vomiting with inability to tolerate food or liquids, persistent diarrhea, or if your pain is not controlled on your discharge pain medications.  FOLLOW-UP:  - Please call to make a follow-up appointment within 1-2 weeks of discharge  with your surgeons Dr Campos and Dr Wagner  - Please follow up with your primary care physician in 1-2 weeks regarding your hospitalization      SECONDARY PROCEDURE  Procedure: Repair, hernia, incisional, reducible, recurrent  Findings and Treatment:

## 2022-03-18 NOTE — DISCHARGE NOTE PROVIDER - NSDCMRMEDTOKEN_GEN_ALL_CORE_FT
Adipex-P 37.5 mg oral tablet: 1 tab(s) orally once a day  amLODIPine 5 mg oral tablet: 1 tab(s) orally once a day  lisinopril 10 mg oral tablet: 1 tab(s) orally 2 times a day  Multiple Vitamins oral tablet: 1 tab(s) orally once a day  Percocet 5/325 oral tablet: 1 tab(s) orally every 6 hours, As Needed  Vitamin C 1000 mg oral tablet: 1 tab(s) orally once a day   Adipex-P 37.5 mg oral tablet: 1 tab(s) orally once a day  amLODIPine 5 mg oral tablet: 1 tab(s) orally once a day  ibuprofen 400 mg oral tablet: 1 tab(s) orally every 6 hours  lisinopril 10 mg oral tablet: 1 tab(s) orally 2 times a day  Multiple Vitamins oral tablet: 1 tab(s) orally once a day  Vitamin C 1000 mg oral tablet: 1 tab(s) orally once a day   amLODIPine 5 mg oral tablet: 1 tab(s) orally once a day  ibuprofen 400 mg oral tablet: 1 tab(s) orally every 6 hours  lisinopril 10 mg oral tablet: 1 tab(s) orally 2 times a day  Multiple Vitamins oral tablet: 1 tab(s) orally once a day  oxyCODONE 5 mg oral tablet: 1 tab(s) orally every 6 hours, As Needed -Severe Pain (7 - 10) MDD:4  Tylenol 325 mg oral tablet: 3 tab(s) orally every 8 hours, As Needed for mild pain  Vitamin C 1000 mg oral tablet: 1 tab(s) orally once a day

## 2022-03-18 NOTE — DISCHARGE NOTE PROVIDER - CARE PROVIDER_API CALL
Chi Campos)  ColonRectal Surgery; Surgery  [address], [address]  [place] [address]  Phone: [phone]  Fax: [phone]  Follow Up Time:     Mann Wagner)  Surgery  [address], [address]  [place] [address]  Phone: [phone]  Fax: [phone]  Follow Up Time:

## 2022-03-18 NOTE — DISCHARGE NOTE PROVIDER - HOSPITAL COURSE
56 year old male with PMH of HTN , Covid in August of 2021, Status post Dev procedure for perforated diverticulitis in 9/21@ [facility] / discharged home with an extended course of antibiotic and required wound VAC placement which was removed . Pt noted with abdominal bulge, c/o abdominal pain/discomfort noted with abdominal wall hernia . Pt was seen by Dr. Chi Campos and Dr Wagner -  presents for scheduled Dev reversal combined with incisional hernia repair.    Pt admitted to surgery. 3/15 sp OR for hartmanns reversal and repair of incisional hernia.  Initially upon PACU entry, patient was in severe abdominal pain (10/10). Anesthesia gave 10ml of 0.25% bupivacaine with epinephrine at the level of L2-L3. Pain improved however patient now requiring pressors support (0.5 ben) to counteract the epidural. With c/o le weakness after receiving spinal epidural. Seen by anesthesia epidural capped, PCA Dilaudid initiated, epidural to be removed, weakness improved. Pt tachycardic, suspected 2/2 pain, ekg showing normal sinus rhythm.       Vac changed 3x/week per vac team, vac delivered to bedside. PT evaluated pt and recommended outpatient PT. Will need VNS for vac care at home. 56 year old male with PMH of HTN , Covid in August of 2021, Status post Dev procedure for perforated diverticulitis in 9/21@ [facility] / discharged home with an extended course of antibiotic and required wound VAC placement which was removed . Pt noted with abdominal bulge, c/o abdominal pain/discomfort noted with abdominal wall hernia . Pt was seen by Dr. Chi Campos and Dr Wagner -  presents for scheduled Dev reversal combined with incisional hernia repair.    Pt admitted to surgery. 3/15 sp OR for hartmanns reversal and repair of incisional hernia.  Initially upon PACU entry, patient was in severe abdominal pain (10/10). Anesthesia gave 10ml of 0.25% bupivacaine with epinephrine at the level of L2-L3. Pain improved however patient now requiring pressors support (0.5 ben) to counteract the epidural. With c/o le weakness after receiving spinal epidural. Seen by anesthesia epidural capped, PCA Dilaudid initiated, epidural to be removed, weakness improved. Pt tachycardic, suspected 2/2 pain, ekg showing normal sinus rhythm. 3/20 pt febrile with some red streaking along ex lap  incisios, increased erythema from prior, fever w/u sent (ua and cxr neg). Redness around vac site improved. Bld cxs ngtd. PCA dcd. Pt continued to improve, gi fxn returned. diet advanced as tolerated.     Vac changed 3x/week per vac team, vac delivered to bedside. PT evaluated pt and recommended outpatient PT. Will need VNS for vac care at home.    Patient continued to recover appropriately and was deemed stable for dc. At the time of discharge, the patient was hemodynamically stable, tolerating PO diet, voiding urine, passing stool, ambulating and was comfortable with adequate pain control. The patient was instructed to follow up with Dr. Campos within 1-2 weeks after discharge. 56 year old male with PMH of HTN , Covid in August of 2021, Status post Dev procedure for perforated diverticulitis in 9/21@ [facility] / discharged home with an extended course of antibiotic and required wound VAC placement which was removed . Pt noted with abdominal bulge, c/o abdominal pain/discomfort noted with abdominal wall hernia . Pt was seen by Dr. Chi Campos and Dr Wagner -  presents for scheduled Dev reversal combined with incisional hernia repair.    Pt admitted to surgery. 3/15 sp OR for hartmanns reversal and repair of incisional hernia.  Initially upon PACU entry, patient was in severe abdominal pain (10/10). Anesthesia gave 10ml of 0.25% bupivacaine with epinephrine at the level of L2-L3. Pain improved however patient now requiring pressors support (0.5 ben) to counteract the epidural. With c/o le weakness after receiving spinal epidural. Seen by anesthesia epidural capped, PCA Dilaudid initiated, epidural to be removed, weakness improved. Pt tachycardic, suspected 2/2 pain, ekg showing normal sinus rhythm. 3/20 pt febrile with some red streaking along ex lap  incisios, increased erythema from prior, fever w/u sent (ua and cxr neg). Redness around vac site improved. Bld cxs ngtd. PCA dcd. Pt continued to improve, gi fxn returned. diet advanced as tolerated. A1C noted to be 7.5, fs stable,recommended clos eoutpatient follow up with PMD.    Vac changed 3x/week per vac team, vac delivered to bedside. PT evaluated pt and recommended NPT. Will need VNS for vac care at home.    Patient continued to recover appropriately and was deemed stable for dc. At the time of discharge, the patient was hemodynamically stable, tolerating PO diet, voiding urine, passing stool, ambulating and was comfortable with adequate pain control. The patient was instructed to follow up with Dr. Campos within 1-2 weeks after discharge. 56 year old male with PMH of HTN , Covid in August of 2021, Status post Dev procedure for perforated diverticulitis in 9/21@ [facility] / discharged home with an extended course of antibiotic and required wound VAC placement which was removed . Pt noted with abdominal bulge, c/o abdominal pain/discomfort noted with abdominal wall hernia . Pt was seen by Dr. Chi Campos and Dr Wagner -  presents for scheduled Dev reversal combined with incisional hernia repair.    Pt admitted to surgery. 3/15 sp OR for hartmanns reversal and repair of incisional hernia.  Initially upon PACU entry, patient was in severe abdominal pain (10/10). Anesthesia gave 10ml of 0.25% bupivacaine with epinephrine at the level of L2-L3. Pain improved however patient now requiring pressors support (0.5 ben) to counteract the epidural. With c/o le weakness after receiving spinal epidural. Seen by anesthesia epidural capped, PCA Dilaudid initiated, epidural to be removed, weakness improved. Pt tachycardic, suspected 2/2 pain, ekg showing normal sinus rhythm. 3/20 pt febrile with some red streaking along ex lap  incisios, increased erythema from prior, fever w/u sent (ua and cxr neg). Redness around vac site improved. Bld cxs ngtd. PCA dcd. Pt continued to improve, gi fxn returned. diet advanced as tolerated. A1C noted to be 7.5 on 2/26/22, pt aware, , fs stable,recommended clos eoutpatient follow up with PMD.    Vac changed 3x/week per vac team, vac delivered to bedside. PT evaluated pt and recommended NPT. Will need VNS for vac care at home.    Patient continued to recover appropriately and was deemed stable for dc. At the time of discharge, the patient was hemodynamically stable, tolerating PO diet, voiding urine, passing stool, ambulating and was comfortable with adequate pain control. The patient was instructed to follow up with Dr. Campos within 1-2 weeks after discharge.

## 2022-03-18 NOTE — DISCHARGE NOTE PROVIDER - CARE PROVIDERS DIRECT ADDRESSES
[email or web address]

## 2022-03-18 NOTE — DISCHARGE NOTE PROVIDER - NSDCCPCAREPLAN_GEN_ALL_CORE_FT
PRINCIPAL DISCHARGE DIAGNOSIS  Diagnosis: Diverticulitis large intestine w/o perforation or abscess w/o bleeding  Assessment and Plan of Treatment:        PRINCIPAL DISCHARGE DIAGNOSIS  Diagnosis: Diverticulitis large intestine w/o perforation or abscess w/o bleeding  Assessment and Plan of Treatment:       SECONDARY DISCHARGE DIAGNOSES  Diagnosis: Elevated hemoglobin A1c  Assessment and Plan of Treatment: your a1c (average glucose level over the past 3 months) was elevated at 7.5, please follow up with your primary care doctor after discharge for further management  continue carbohydrate consistent diet     PRINCIPAL DISCHARGE DIAGNOSIS  Diagnosis: Diverticulitis large intestine w/o perforation or abscess w/o bleeding  Assessment and Plan of Treatment:       SECONDARY DISCHARGE DIAGNOSES  Diagnosis: Elevated hemoglobin A1c  Assessment and Plan of Treatment: your a1c (average glucose level over the past 3 months) was elevated at 7.5 on 2/26/22, please follow up with your primary care doctor  (Dr Bernabe) after discharge for further management; also discuss with primary doctor further use of adipex as outpatient  continue carbohydrate consistent diet

## 2022-03-18 NOTE — PROGRESS NOTE ADULT - SUBJECTIVE AND OBJECTIVE BOX
Day 3\2 of Anesthesia Pain Management Service    SUBJECTIVE: I'm doing ok    Pain Scale Score:	[X] Refer to charted pain scores    THERAPY:    [ ] IV PCA Morphine		[ ] 5 mg/mL	[ ] 1 mg/mL  [X] IV PCA Hydromorphone	[ ] 5 mg/mL	[X] 1 mg/mL  [ ] IV PCA Fentanyl		[ ] 50 micrograms/mL    Demand dose: 0.25 mg     Lockout: 6 minutes   Continuous Rate: 0 mg/hr  4 Hour Limit: 6 mg    MEDICATIONS  (STANDING):  acetaminophen   IVPB .. 1000 milliGRAM(s) IV Intermittent every 6 hours  bupivacaine  0.25%/epinephrine [id number] Injectable 10 milliLiter(s) Local Injection once  dextrose 40% Gel 15 Gram(s) Oral once  dextrose 5% + sodium chloride 0.45% with potassium chloride 20 mEq/L 1000 milliLiter(s) (125 mL/Hr) IV Continuous <Continuous>  glucagon  Injectable 1 milliGRAM(s) IntraMuscular once  glucagon  Injectable 1 milliGRAM(s) IntraMuscular once  heparin   Injectable 5000 Unit(s) SubCutaneous every 8 hours  HYDROmorphone PCA (1 mG/mL) 30 milliLiter(s) PCA Continuous PCA Continuous  insulin lispro (ADMELOG) corrective regimen sliding scale   SubCutaneous every 6 hours  pantoprazole  Injectable 40 milliGRAM(s) IV Push daily  sodium phosphate IVPB 30 milliMole(s) IV Intermittent once    MEDICATIONS  (PRN):  cyclobenzaprine 5 milliGRAM(s) Oral three times a day PRN Muscle Spasm  HYDROmorphone PCA (1 mG/mL) Rescue Clinician Bolus 0.5 milliGRAM(s) IV Push every 15 minutes PRN for Pain Scale GREATER THAN 6  naloxone Injectable 0.1 milliGRAM(s) IV Push every 3 minutes PRN For ANY of the following changes in patient status:  A. RR LESS THAN 10 breaths per minute, B. Oxygen saturation LESS THAN 90%, C. Sedation score of 6  ondansetron Injectable 4 milliGRAM(s) IV Push every 6 hours PRN Nausea      OBJECTIVE:    Sedation Score:	[ X] Alert 	[ ] Drowsy 	[ ] Arousable	[ ] Asleep	[ ] Unresponsive    Side Effects:	[X ] None	[ ] Nausea	[ ] Vomiting	[ ] Pruritus  		[ ] Other:    Vital Signs Last 24 Hrs  T(C): 36.4 (18 Mar 2022 09:45), Max: 37.9 (17 Mar 2022 13:05)  T(F): 97.6 (18 Mar 2022 09:45), Max: 100.3 (18 Mar 2022 02:32)  HR: 90 (18 Mar 2022 09:45) (90 - 110)  BP: 129/86 (18 Mar 2022 09:45) (103/69 - 148/82)  BP(mean): --  RR: 18 (18 Mar 2022 09:45) (18 - 18)  SpO2: 92% (18 Mar 2022 09:45) (92% - 97%)    ASSESSMENT/ PLAN    Therapy to  be:               [X] Continued   [ ] Discontinued   [ ] Changed to PRN Analgesics    Documentation and Verification of current medications:   [X] Done	[ ] Not done, not eligible    Comments: Endorsing good analgesia with PCA, tolerated VAC dressing change. PCA use 9.75mg / 24 hours. Reeducated to use.

## 2022-03-19 LAB
ANION GAP SERPL CALC-SCNC: 8 MMOL/L — SIGNIFICANT CHANGE UP (ref 5–17)
APPEARANCE UR: CLEAR — SIGNIFICANT CHANGE UP
BACTERIA # UR AUTO: NEGATIVE — SIGNIFICANT CHANGE UP
BILIRUB UR-MCNC: NEGATIVE — SIGNIFICANT CHANGE UP
BUN SERPL-MCNC: 10 MG/DL — SIGNIFICANT CHANGE UP (ref 7–23)
CALCIUM SERPL-MCNC: 8.7 MG/DL — SIGNIFICANT CHANGE UP (ref 8.4–10.5)
CHLORIDE SERPL-SCNC: 99 MMOL/L — SIGNIFICANT CHANGE UP (ref 96–108)
CO2 SERPL-SCNC: 26 MMOL/L — SIGNIFICANT CHANGE UP (ref 22–31)
COLOR SPEC: YELLOW — SIGNIFICANT CHANGE UP
CREAT SERPL-MCNC: 0.72 MG/DL — SIGNIFICANT CHANGE UP (ref 0.5–1.3)
DIFF PNL FLD: ABNORMAL
EGFR: 107 ML/MIN/1.73M2 — SIGNIFICANT CHANGE UP
EPI CELLS # UR: 3 /HPF — SIGNIFICANT CHANGE UP
GLUCOSE BLDC GLUCOMTR-MCNC: 111 MG/DL — HIGH (ref 70–99)
GLUCOSE BLDC GLUCOMTR-MCNC: 125 MG/DL — HIGH (ref 70–99)
GLUCOSE BLDC GLUCOMTR-MCNC: 135 MG/DL — HIGH (ref 70–99)
GLUCOSE BLDC GLUCOMTR-MCNC: 154 MG/DL — HIGH (ref 70–99)
GLUCOSE SERPL-MCNC: 133 MG/DL — HIGH (ref 70–99)
GLUCOSE UR QL: NEGATIVE — SIGNIFICANT CHANGE UP
HCT VFR BLD CALC: 35.3 % — LOW (ref 39–50)
HGB BLD-MCNC: 11.2 G/DL — LOW (ref 13–17)
HYALINE CASTS # UR AUTO: 8 /LPF — HIGH (ref 0–2)
KETONES UR-MCNC: ABNORMAL
LEUKOCYTE ESTERASE UR-ACNC: NEGATIVE — SIGNIFICANT CHANGE UP
MAGNESIUM SERPL-MCNC: 2.2 MG/DL — SIGNIFICANT CHANGE UP (ref 1.6–2.6)
MCHC RBC-ENTMCNC: 30 PG — SIGNIFICANT CHANGE UP (ref 27–34)
MCHC RBC-ENTMCNC: 31.7 GM/DL — LOW (ref 32–36)
MCV RBC AUTO: 94.6 FL — SIGNIFICANT CHANGE UP (ref 80–100)
NITRITE UR-MCNC: NEGATIVE — SIGNIFICANT CHANGE UP
NRBC # BLD: 0 /100 WBCS — SIGNIFICANT CHANGE UP (ref 0–0)
PH UR: 6 — SIGNIFICANT CHANGE UP (ref 5–8)
PHOSPHATE SERPL-MCNC: 2.2 MG/DL — LOW (ref 2.5–4.5)
PLATELET # BLD AUTO: 299 K/UL — SIGNIFICANT CHANGE UP (ref 150–400)
POTASSIUM SERPL-MCNC: 4.2 MMOL/L — SIGNIFICANT CHANGE UP (ref 3.5–5.3)
POTASSIUM SERPL-SCNC: 4.2 MMOL/L — SIGNIFICANT CHANGE UP (ref 3.5–5.3)
PROT UR-MCNC: 100 — SIGNIFICANT CHANGE UP
RBC # BLD: 3.73 M/UL — LOW (ref 4.2–5.8)
RBC # FLD: 13.3 % — SIGNIFICANT CHANGE UP (ref 10.3–14.5)
RBC CASTS # UR COMP ASSIST: 42 /HPF — HIGH (ref 0–4)
SODIUM SERPL-SCNC: 133 MMOL/L — LOW (ref 135–145)
SP GR SPEC: 1.02 — SIGNIFICANT CHANGE UP (ref 1.01–1.02)
UROBILINOGEN FLD QL: NEGATIVE — SIGNIFICANT CHANGE UP
WBC # BLD: 8.45 K/UL — SIGNIFICANT CHANGE UP (ref 3.8–10.5)
WBC # FLD AUTO: 8.45 K/UL — SIGNIFICANT CHANGE UP (ref 3.8–10.5)
WBC UR QL: 14 /HPF — HIGH (ref 0–5)

## 2022-03-19 PROCEDURE — 71045 X-RAY EXAM CHEST 1 VIEW: CPT | Mod: 26

## 2022-03-19 RX ORDER — POTASSIUM PHOSPHATE, MONOBASIC POTASSIUM PHOSPHATE, DIBASIC 236; 224 MG/ML; MG/ML
15 INJECTION, SOLUTION INTRAVENOUS ONCE
Refills: 0 | Status: COMPLETED | OUTPATIENT
Start: 2022-03-19 | End: 2022-03-19

## 2022-03-19 RX ORDER — ACETAMINOPHEN 500 MG
1000 TABLET ORAL ONCE
Refills: 0 | Status: COMPLETED | OUTPATIENT
Start: 2022-03-19 | End: 2022-03-19

## 2022-03-19 RX ORDER — CEFAZOLIN SODIUM 1 G
1000 VIAL (EA) INJECTION ONCE
Refills: 0 | Status: DISCONTINUED | OUTPATIENT
Start: 2022-03-19 | End: 2022-03-19

## 2022-03-19 RX ORDER — SODIUM,POTASSIUM PHOSPHATES 278-250MG
1 POWDER IN PACKET (EA) ORAL ONCE
Refills: 0 | Status: DISCONTINUED | OUTPATIENT
Start: 2022-03-19 | End: 2022-03-19

## 2022-03-19 RX ORDER — CEFAZOLIN SODIUM 1 G
VIAL (EA) INJECTION
Refills: 0 | Status: DISCONTINUED | OUTPATIENT
Start: 2022-03-19 | End: 2022-03-19

## 2022-03-19 RX ADMIN — Medication 400 MILLIGRAM(S): at 00:24

## 2022-03-19 RX ADMIN — Medication 400 MILLIGRAM(S): at 21:58

## 2022-03-19 RX ADMIN — Medication 1: at 00:54

## 2022-03-19 RX ADMIN — POTASSIUM PHOSPHATE, MONOBASIC POTASSIUM PHOSPHATE, DIBASIC 62.5 MILLIMOLE(S): 236; 224 INJECTION, SOLUTION INTRAVENOUS at 22:59

## 2022-03-19 RX ADMIN — Medication 100 MILLIGRAM(S): at 21:59

## 2022-03-19 RX ADMIN — HYDROMORPHONE HYDROCHLORIDE 30 MILLILITER(S): 2 INJECTION INTRAMUSCULAR; INTRAVENOUS; SUBCUTANEOUS at 20:59

## 2022-03-19 RX ADMIN — HYDROMORPHONE HYDROCHLORIDE 0.5 MILLIGRAM(S): 2 INJECTION INTRAMUSCULAR; INTRAVENOUS; SUBCUTANEOUS at 21:01

## 2022-03-19 RX ADMIN — HEPARIN SODIUM 5000 UNIT(S): 5000 INJECTION INTRAVENOUS; SUBCUTANEOUS at 22:00

## 2022-03-19 RX ADMIN — HYDROMORPHONE HYDROCHLORIDE 30 MILLILITER(S): 2 INJECTION INTRAMUSCULAR; INTRAVENOUS; SUBCUTANEOUS at 08:27

## 2022-03-19 RX ADMIN — HEPARIN SODIUM 5000 UNIT(S): 5000 INJECTION INTRAVENOUS; SUBCUTANEOUS at 13:18

## 2022-03-19 RX ADMIN — PANTOPRAZOLE SODIUM 40 MILLIGRAM(S): 20 TABLET, DELAYED RELEASE ORAL at 13:17

## 2022-03-19 RX ADMIN — Medication 1000 MILLIGRAM(S): at 00:54

## 2022-03-19 RX ADMIN — HEPARIN SODIUM 5000 UNIT(S): 5000 INJECTION INTRAVENOUS; SUBCUTANEOUS at 05:30

## 2022-03-19 RX ADMIN — Medication 1000 MILLIGRAM(S): at 22:28

## 2022-03-19 NOTE — PROGRESS NOTE ADULT - SUBJECTIVE AND OBJECTIVE BOX
Day 4 of Anesthesia Pain Management Service    SUBJECTIVE: Patient is doing well with IV PCA    Pain Scale Score:	[X] Refer to charted pain scores    THERAPY:    [ ] IV PCA Morphine		[ ] 5 mg/mL	[ ] 1 mg/mL  [X] IV PCA Hydromorphone	[ ] 5 mg/mL	[X] 1 mg/mL  [ ] IV PCA Fentanyl		[ ] 50 micrograms/mL    Demand dose: 0.25 mg     Lockout: 6 minutes   Continuous Rate: 0 mg/hr  4 Hour Limit: 4 mg    MEDICATIONS  (STANDING):  bupivacaine  0.25%/epinephrine [id number] Injectable 10 milliLiter(s) Local Injection once  dextrose 40% Gel 15 Gram(s) Oral once  dextrose 5% + sodium chloride 0.45% with potassium chloride 20 mEq/L 1000 milliLiter(s) (125 mL/Hr) IV Continuous <Continuous>  glucagon  Injectable 1 milliGRAM(s) IntraMuscular once  glucagon  Injectable 1 milliGRAM(s) IntraMuscular once  heparin   Injectable 5000 Unit(s) SubCutaneous every 8 hours  HYDROmorphone PCA (1 mG/mL) 30 milliLiter(s) PCA Continuous PCA Continuous  insulin lispro (ADMELOG) corrective regimen sliding scale   SubCutaneous every 6 hours  pantoprazole  Injectable 40 milliGRAM(s) IV Push daily    MEDICATIONS  (PRN):  cyclobenzaprine 5 milliGRAM(s) Oral three times a day PRN Muscle Spasm  HYDROmorphone PCA (1 mG/mL) Rescue Clinician Bolus 0.5 milliGRAM(s) IV Push every 15 minutes PRN for Pain Scale GREATER THAN 6  naloxone Injectable 0.1 milliGRAM(s) IV Push every 3 minutes PRN For ANY of the following changes in patient status:  A. RR LESS THAN 10 breaths per minute, B. Oxygen saturation LESS THAN 90%, C. Sedation score of 6  ondansetron Injectable 4 milliGRAM(s) IV Push every 6 hours PRN Nausea      OBJECTIVE:    Sedation Score:	[ X] Alert	[ ] Drowsy 	[ ] Arousable	[ ] Asleep	[ ] Unresponsive    Side Effects:	[X ] None	[ ] Nausea	[ ] Vomiting	[ ] Pruritus  		[ ] Other:    Vital Signs Last 24 Hrs  T(C): 37.3 (19 Mar 2022 10:04), Max: 37.8 (19 Mar 2022 04:16)  T(F): 99.1 (19 Mar 2022 10:04), Max: 100 (19 Mar 2022 04:16)  HR: 95 (19 Mar 2022 10:04) (83 - 104)  BP: 148/81 (19 Mar 2022 10:04) (120/76 - 150/91)  BP(mean): --  RR: 18 (19 Mar 2022 10:04) (18 - 18)  SpO2: 91% (19 Mar 2022 10:04) (91% - 96%)    ASSESSMENT/ PLAN    Therapy to  be:               [X] Continued   [ ] Discontinued   [ ] Changed to PRN Analgesics    Documentation and Verification of current medications:   [X] Done	[ ] Not done, not eligible    Comments: Continue current therapy

## 2022-03-19 NOTE — PROGRESS NOTE ADULT - SUBJECTIVE AND OBJECTIVE BOX
TEAM Surgery Progress Note    INTERVAL EVENTS: Post-repletion phosphorous level = 2.7. Provided additional repletion of 15millemole IV sodium phos. No acute events overnight. No n/v.  SUBJECTIVE: Patient seen and examined at bedside with surgical team        OBJECTIVE:    Vital Signs Last 24 Hrs  T(C): 37.2 (18 Mar 2022 20:17), Max: 37.9 (18 Mar 2022 02:32)  T(F): 99 (18 Mar 2022 20:17), Max: 100.3 (18 Mar 2022 02:32)  HR: 104 (18 Mar 2022 20:17) (90 - 110)  BP: 147/89 (18 Mar 2022 20:17) (120/76 - 150/91)  BP(mean): --  RR: 18 (18 Mar 2022 20:17) (18 - 18)  SpO2: 95% (18 Mar 2022 20:17) (92% - 95%)I&O's Detail    17 Mar 2022 07:01  -  18 Mar 2022 07:00  --------------------------------------------------------  IN:    dextrose 5% + sodium chloride 0.45% w/ Additives: 1500 mL  Total IN: 1500 mL    OUT:    Indwelling Catheter - Urethral (mL): 300 mL    Oral Fluid: 0 mL    Voided (mL): 1500 mL  Total OUT: 1800 mL    Total NET: -300 mL      18 Mar 2022 07:01  -  19 Mar 2022 01:00  --------------------------------------------------------  IN:    dextrose 5% + sodium chloride 0.45% w/ Additives: 1250 mL    IV PiggyBack: 500 mL  Total IN: 1750 mL    OUT:    Oral Fluid: 0 mL    Voided (mL): 675 mL  Total OUT: 675 mL    Total NET: 1075 mL      MEDICATIONS  (STANDING):  bupivacaine  0.25%/epinephrine [id number] Injectable 10 milliLiter(s) Local Injection once  dextrose 40% Gel 15 Gram(s) Oral once  dextrose 5% + sodium chloride 0.45% with potassium chloride 20 mEq/L 1000 milliLiter(s) (125 mL/Hr) IV Continuous <Continuous>  glucagon  Injectable 1 milliGRAM(s) IntraMuscular once  glucagon  Injectable 1 milliGRAM(s) IntraMuscular once  heparin   Injectable 5000 Unit(s) SubCutaneous every 8 hours  HYDROmorphone PCA (1 mG/mL) 30 milliLiter(s) PCA Continuous PCA Continuous  insulin lispro (ADMELOG) corrective regimen sliding scale   SubCutaneous every 6 hours  pantoprazole  Injectable 40 milliGRAM(s) IV Push daily    MEDICATIONS  (PRN):  cyclobenzaprine 5 milliGRAM(s) Oral three times a day PRN Muscle Spasm  HYDROmorphone PCA (1 mG/mL) Rescue Clinician Bolus 0.5 milliGRAM(s) IV Push every 15 minutes PRN for Pain Scale GREATER THAN 6  naloxone Injectable 0.1 milliGRAM(s) IV Push every 3 minutes PRN For ANY of the following changes in patient status:  A. RR LESS THAN 10 breaths per minute, B. Oxygen saturation LESS THAN 90%, C. Sedation score of 6  ondansetron Injectable 4 milliGRAM(s) IV Push every 6 hours PRN Nausea    Physical Exam:  General: NAD, resting comfortably in bed  Neuro: A/O x 3  Pulmonary: Nonlabored breathing, no respiratory distress  Abdominal: soft, moderately distended, appropriate incisional tenderness, wound vac in place. No herniation or defect noted on abdominal wall.  Incision: C/D/I   Extremities: [facility]       LABS:                        11.1   7.86  )-----------( 235      ( 18 Mar 2022 08:04 )             35.8     03-18    136  |  100  |  9   ----------------------------<  160<H>  4.1   |  24  |  0.75    Ca    8.7      18 Mar 2022 08:04  Phos  2.7     03-18  Mg     2.0     03-18                IMAGING:     TEAM Surgery Progress Note    INTERVAL EVENTS: Post-repletion phosphorous level = 2.7. Provided additional repletion of 15millemole IV sodium phos. No acute events overnight. No n/v.  SUBJECTIVE: Patient seen and examined at bedside with surgical team        OBJECTIVE:     Vital Signs Last 24 Hrs  T(C): 37.3 (19 Mar 2022 10:04), Max: 37.8 (19 Mar 2022 04:16)  T(F): 99.1 (19 Mar 2022 10:04), Max: 100 (19 Mar 2022 04:16)  HR: 95 (19 Mar 2022 10:04) (83 - 104)  BP: 148/81 (19 Mar 2022 10:04) (120/76 - 150/91)  BP(mean): --  RR: 18 (19 Mar 2022 10:04) (18 - 18)  SpO2: 91% (19 Mar 2022 10:04) (91% - 96%)    I&O's Detail    18 Mar 2022 07:01  -  19 Mar 2022 07:00  --------------------------------------------------------  IN:    dextrose 5% + sodium chloride 0.45% w/ Additives: 1250 mL    IV PiggyBack: 500 mL  Total IN: 1750 mL    OUT:    Oral Fluid: 0 mL    Voided (mL): 1025 mL  Total OUT: 1025 mL    Total NET: 725 mL            MEDICATIONS  (STANDING):  bupivacaine  0.25%/epinephrine 1:[id number] Injectable 10 milliLiter(s) Local Injection once  dextrose 40% Gel 15 Gram(s) Oral once  dextrose 5% + sodium chloride 0.45% with potassium chloride 20 mEq/L 1000 milliLiter(s) (125 mL/Hr) IV Continuous <Continuous>  glucagon  Injectable 1 milliGRAM(s) IntraMuscular once  glucagon  Injectable 1 milliGRAM(s) IntraMuscular once  heparin   Injectable 5000 Unit(s) SubCutaneous every 8 hours  HYDROmorphone PCA (1 mG/mL) 30 milliLiter(s) PCA Continuous PCA Continuous  insulin lispro (ADMELOG) corrective regimen sliding scale   SubCutaneous every 6 hours  pantoprazole  Injectable 40 milliGRAM(s) IV Push daily    MEDICATIONS  (PRN):  cyclobenzaprine 5 milliGRAM(s) Oral three times a day PRN Muscle Spasm  HYDROmorphone PCA (1 mG/mL) Rescue Clinician Bolus 0.5 milliGRAM(s) IV Push every 15 minutes PRN for Pain Scale GREATER THAN 6  naloxone Injectable 0.1 milliGRAM(s) IV Push every 3 minutes PRN For ANY of the following changes in patient status:  A. RR LESS THAN 10 breaths per minute, B. Oxygen saturation LESS THAN 90%, C. Sedation score of 6  ondansetron Injectable 4 milliGRAM(s) IV Push every 6 hours PRN Nausea    Physical Exam:  General: NAD, resting comfortably in bed  Neuro: A/O x 3  Pulmonary: Nonlabored breathing, no respiratory distress  Abdominal: soft, moderately distended, appropriate incisional tenderness, wound vac in place, changed earlier in the AM. No herniation or defect noted on abdominal wall.  Incision: C/D/I   Extremities: [facility]       LABS:                        11.1   7.86  )-----------( 235      ( 18 Mar 2022 08:04 )             35.8     03-18    136  |  100  |  9   ----------------------------<  160<H>  4.1   |  24  |  0.75    Ca    8.7      18 Mar 2022 08:04  Phos  2.7     03-18  Mg     2.0     03-18                IMAGING:

## 2022-03-19 NOTE — PROGRESS NOTE ADULT - ASSESSMENT
6y with PMH of HTN , Covid in August of 2021, Status post Dev procedure for perforated diverticulitis in 9/21@ [facility] / discharged home with an extended course of antibiotic and required wound VAC placement which was removed . Pt noted with abdominal bulge, c/o abdominal pain/discomfort noted with abdominal wall hernia. Patient now s/p Dev reversal and repair of midline abdominal wall defect (3/15). Patient requiring pressor support secondary to administration of epidural bupivacaine. Pain now controlled and being weaned off ben.     PLAN:  - Continue to monitor wound vac output.   - Pain control as per Pain and primary team (pt complaining of PCEA).  * - FU left lower extremity weakness. Present since spinal epidural was placed.   - Encourage IS  - Nausea control PRN  - Monitor vitals  - Keep Tello, but remove left u-cath  - Diet: IVF + NPO w sips  - Monitor I+Os  - OOB/ Ambulate/ PT  - DVT ppx  - Primary care per red team surgery    Green Team Surgery, [phone]   6y with PMH of HTN , Covid in August of 2021, Status post Dev procedure for perforated diverticulitis in 9/21@ [facility] / discharged home with an extended course of antibiotic and required wound VAC placement which was removed . Pt noted with abdominal bulge, c/o abdominal pain/discomfort noted with abdominal wall hernia. Patient now s/p Dev reversal and repair of midline abdominal wall defect (3/15). Patient requiring pressor support secondary to administration of epidural bupivacaine. Pain now controlled and being weaned off ben.     PLAN:  - Continue to monitor wound vac output.   - Pain control as per Pain and primary team (pt complaining of PCEA).  * - FU left lower extremity weakness. Present since spinal epidural was placed.   - Encourage IS  - Nausea control PRN  - Monitor vitals  - Diet: IVF + NPO w sips  - Monitor I+Os  - OOB/ Ambulate/ PT  - DVT ppx  - Primary care per red team surgery    Green Team Surgery, [phone]

## 2022-03-19 NOTE — PROGRESS NOTE ADULT - SUBJECTIVE AND OBJECTIVE BOX
Surgery Progress Note    Subjective:     Patient seen and examined at bedside. Patient without complaints this AM. Mild pain in abdomen. Persistently -/- bowl function.     OBJECTIVE:     T(C): 37.8 (03-19-22 @ 04:16), Max: 37.8 (03-19-22 @ 04:16)  HR: 83 (03-19-22 @ 04:16) (83 - 104)  BP: 126/65 (03-19-22 @ 04:16) (120/76 - 150/91)  RR: 18 (03-19-22 @ 04:16) (18 - 18)  SpO2: 96% (03-19-22 @ 04:16) (92% - 96%)  Wt(kg): --    I&O's Detail    18 Mar 2022 07:01  -  19 Mar 2022 07:00  --------------------------------------------------------  IN:    dextrose 5% + sodium chloride 0.45% w/ Additives: 1250 mL    IV PiggyBack: 500 mL  Total IN: 1750 mL    OUT:    Oral Fluid: 0 mL    Voided (mL): 1025 mL  Total OUT: 1025 mL    Total NET: 725 mL          PHYSICAL EXAM:    GENERAL: NAD, lying in bed comfortably. Pleasant and responsive  CHEST/LUNG: Unlabored respirations. Comfortable on room air  ABDOMEN: Severely distended without peritoneal signs. Vac along L shaped incision     MEDICATIONS  (STANDING):  bupivacaine  0.25%/epinephrine [id number] Injectable 10 milliLiter(s) Local Injection once  dextrose 40% Gel 15 Gram(s) Oral once  dextrose 5% + sodium chloride 0.45% with potassium chloride 20 mEq/L 1000 milliLiter(s) (125 mL/Hr) IV Continuous <Continuous>  glucagon  Injectable 1 milliGRAM(s) IntraMuscular once  glucagon  Injectable 1 milliGRAM(s) IntraMuscular once  heparin   Injectable 5000 Unit(s) SubCutaneous every 8 hours  HYDROmorphone PCA (1 mG/mL) 30 milliLiter(s) PCA Continuous PCA Continuous  insulin lispro (ADMELOG) corrective regimen sliding scale   SubCutaneous every 6 hours  pantoprazole  Injectable 40 milliGRAM(s) IV Push daily    MEDICATIONS  (PRN):  cyclobenzaprine 5 milliGRAM(s) Oral three times a day PRN Muscle Spasm  HYDROmorphone PCA (1 mG/mL) Rescue Clinician Bolus 0.5 milliGRAM(s) IV Push every 15 minutes PRN for Pain Scale GREATER THAN 6  naloxone Injectable 0.1 milliGRAM(s) IV Push every 3 minutes PRN For ANY of the following changes in patient status:  A. RR LESS THAN 10 breaths per minute, B. Oxygen saturation LESS THAN 90%, C. Sedation score of 6  ondansetron Injectable 4 milliGRAM(s) IV Push every 6 hours PRN Nausea      LABS:                          11.1   7.86  )-----------( 235      ( 18 Mar 2022 08:04 )             35.8     03-18    136  |  100  |  9   ----------------------------<  160<H>  4.1   |  24  |  0.75    Ca    8.7      18 Mar 2022 08:04  Phos  2.7     03-18  Mg     2.0     03-18                 Surgery Progress Note    Subjective:     Patient seen and examined at bedside. Patient without complaints this AM. Mild pain in abdomen. Persistently -/- bowl function. At bedside for vac change    OBJECTIVE:     T(C): 37.8 (03-19-22 @ 04:16), Max: 37.8 (03-19-22 @ 04:16)  HR: 83 (03-19-22 @ 04:16) (83 - 104)  BP: 126/65 (03-19-22 @ 04:16) (120/76 - 150/91)  RR: 18 (03-19-22 @ 04:16) (18 - 18)  SpO2: 96% (03-19-22 @ 04:16) (92% - 96%)  Wt(kg): --    I&O's Detail    18 Mar 2022 07:01  -  19 Mar 2022 07:00  --------------------------------------------------------  IN:    dextrose 5% + sodium chloride 0.45% w/ Additives: 1250 mL    IV PiggyBack: 500 mL  Total IN: 1750 mL    OUT:    Oral Fluid: 0 mL    Voided (mL): 1025 mL  Total OUT: 1025 mL    Total NET: 725 mL          PHYSICAL EXAM:    GENERAL: NAD, lying in bed comfortably. Pleasant and responsive  CHEST/LUNG: Unlabored respirations. Comfortable on room air  ABDOMEN: Severely distended without peritoneal signs. Vac along L shaped incision     MEDICATIONS  (STANDING):  bupivacaine  0.25%/epinephrine [id number] Injectable 10 milliLiter(s) Local Injection once  dextrose 40% Gel 15 Gram(s) Oral once  dextrose 5% + sodium chloride 0.45% with potassium chloride 20 mEq/L 1000 milliLiter(s) (125 mL/Hr) IV Continuous <Continuous>  glucagon  Injectable 1 milliGRAM(s) IntraMuscular once  glucagon  Injectable 1 milliGRAM(s) IntraMuscular once  heparin   Injectable 5000 Unit(s) SubCutaneous every 8 hours  HYDROmorphone PCA (1 mG/mL) 30 milliLiter(s) PCA Continuous PCA Continuous  insulin lispro (ADMELOG) corrective regimen sliding scale   SubCutaneous every 6 hours  pantoprazole  Injectable 40 milliGRAM(s) IV Push daily    MEDICATIONS  (PRN):  cyclobenzaprine 5 milliGRAM(s) Oral three times a day PRN Muscle Spasm  HYDROmorphone PCA (1 mG/mL) Rescue Clinician Bolus 0.5 milliGRAM(s) IV Push every 15 minutes PRN for Pain Scale GREATER THAN 6  naloxone Injectable 0.1 milliGRAM(s) IV Push every 3 minutes PRN For ANY of the following changes in patient status:  A. RR LESS THAN 10 breaths per minute, B. Oxygen saturation LESS THAN 90%, C. Sedation score of 6  ondansetron Injectable 4 milliGRAM(s) IV Push every 6 hours PRN Nausea      LABS:                          11.1   7.86  )-----------( 235      ( 18 Mar 2022 08:04 )             35.8     03-18    136  |  100  |  9   ----------------------------<  160<H>  4.1   |  24  |  0.75    Ca    8.7      18 Mar 2022 08:04  Phos  2.7     03-18  Mg     2.0     03-18

## 2022-03-19 NOTE — PROGRESS NOTE ADULT - ASSESSMENT
ASSESSMENT:56y with PMH of HTN , Covid in August of 2021, Status post Dev procedure for perforated diverticulitis in 9/21@ [facility] / discharged home with an extended course of antibiotic and required wound VAC placement which was removed . Pt noted with abdominal bulge, c/o abdominal pain/discomfort noted with abdominal wall hernia. Patient now s/p Dev reversal and repair of midline abdominal wall defect (3/15). Patient requiring pressor support secondary to administration of epidural bupivacaine. Pain now controlled and being weaned off ben.     PLAN:    - Pain control with PCA and IV pain meds  - AROBF  - Encourage IS  - Nausea control PRN, Low threshold to place NGT if patient has nausea and monitor for nausea/vomiting  - Monitor vitals  - Diet: IVF + NPO w sips  - Monitor I+Os  - OOB/ Ambulate  - DVT ppx  - PT: no skilled needs     Red Team Surgery  [id number] ASSESSMENT:56y with PMH of HTN , Covid in August of 2021, Status post Dev procedure for perforated diverticulitis in 9/21@ [facility] / discharged home with an extended course of antibiotic and required wound VAC placement which was removed . Pt noted with abdominal bulge, c/o abdominal pain/discomfort noted with abdominal wall hernia. Patient now s/p Dev reversal and repair of midline abdominal wall defect (3/15). Patient requiring pressor support secondary to administration of epidural bupivacaine. Pain now controlled and being weaned off ben.     PLAN:    - Pain control with PCA and IV pain meds  - AROBF  - Encourage IS  - Nausea control PRN, Low threshold to place NGT if patient has nausea and monitor for nausea/vomiting  - Monitor vitals  - Diet: IVF + NPO w sips  - Replete electrolytes  - Monitor I+Os  - OOB/ Ambulate  - DVT ppx  - PT: no skilled needs     Red Team Surgery  [id number]

## 2022-03-19 NOTE — CHART NOTE - NSCHARTNOTEFT_GEN_A_CORE
NF Red/Green resident called regarding patient febrile and with skin reddening    On exam, patient feels warm diffusely and has red streaking along length of ex lap incision as well as left sided wound which is also VACed    On comparison of previous photo this AM, increased erythema    Temp recheck w/ rectal rather than PO temp, 101f    cxr, ua, blood cx x2, ofirmev, ancef 1g q8 ordered    will d/w primary team    Saurabh Gil MD NF Red/Green resident called regarding patient febrile and with skin reddening    On exam, patient feels warm diffusely and has red streaking along length of ex lap incision as well as left sided wound which is also VACed    On comparison of previous photo this AM, increased erythema    Temp recheck w/ rectal rather than PO temp, 101f    cxr, ua, blood cx x2, ofirmev, clina 600mg q8 ordered    will d/w primary team    Saurabh Gil MD NF Red/Green resident called regarding patient febrile and with skin reddening    On exam, patient feels warm diffusely and has red streaking along length of ex lap incision as well as left sided wound which is also VACed    On comparison of previous photo this AM, increased erythema    Temp recheck w/ rectal rather than PO temp, 101f    cxr, ua, blood cx x2, ofirmev, clinda 600mg q8 ordered    will d/w primary team    Saurabh Gil MD NF Red/Green resident called regarding patient febrile and with skin reddening    On exam, patient feels warm diffusely and has red streaking along length of ex lap incision as well as left sided wound which is also VACed    On comparison of previous photo this AM, increased erythema    Temp recheck w/ rectal rather than PO temp, 101f    cxr, ua, blood cx x2, ofirmev, clinda 600mg q8 ordered    Addendum: UA, CXR neg    will d/w primary team    Saurabh Gil MD

## 2022-03-20 LAB
ALBUMIN SERPL ELPH-MCNC: 3.3 G/DL — SIGNIFICANT CHANGE UP (ref 3.3–5)
ALP SERPL-CCNC: 66 U/L — SIGNIFICANT CHANGE UP (ref 40–120)
ALT FLD-CCNC: 27 U/L — SIGNIFICANT CHANGE UP (ref 10–45)
ANION GAP SERPL CALC-SCNC: 9 MMOL/L — SIGNIFICANT CHANGE UP (ref 5–17)
AST SERPL-CCNC: 23 U/L — SIGNIFICANT CHANGE UP (ref 10–40)
BILIRUB SERPL-MCNC: 0.7 MG/DL — SIGNIFICANT CHANGE UP (ref 0.2–1.2)
BUN SERPL-MCNC: 9 MG/DL — SIGNIFICANT CHANGE UP (ref 7–23)
CALCIUM SERPL-MCNC: 8.5 MG/DL — SIGNIFICANT CHANGE UP (ref 8.4–10.5)
CHLORIDE SERPL-SCNC: 101 MMOL/L — SIGNIFICANT CHANGE UP (ref 96–108)
CO2 SERPL-SCNC: 27 MMOL/L — SIGNIFICANT CHANGE UP (ref 22–31)
CREAT SERPL-MCNC: 0.72 MG/DL — SIGNIFICANT CHANGE UP (ref 0.5–1.3)
EGFR: 107 ML/MIN/1.73M2 — SIGNIFICANT CHANGE UP
GLUCOSE BLDC GLUCOMTR-MCNC: 124 MG/DL — HIGH (ref 70–99)
GLUCOSE BLDC GLUCOMTR-MCNC: 134 MG/DL — HIGH (ref 70–99)
GLUCOSE BLDC GLUCOMTR-MCNC: 146 MG/DL — HIGH (ref 70–99)
GLUCOSE BLDC GLUCOMTR-MCNC: 156 MG/DL — HIGH (ref 70–99)
GLUCOSE BLDC GLUCOMTR-MCNC: 167 MG/DL — HIGH (ref 70–99)
GLUCOSE SERPL-MCNC: 141 MG/DL — HIGH (ref 70–99)
HCT VFR BLD CALC: 32.9 % — LOW (ref 39–50)
HGB BLD-MCNC: 10.3 G/DL — LOW (ref 13–17)
MAGNESIUM SERPL-MCNC: 2 MG/DL — SIGNIFICANT CHANGE UP (ref 1.6–2.6)
MCHC RBC-ENTMCNC: 29.7 PG — SIGNIFICANT CHANGE UP (ref 27–34)
MCHC RBC-ENTMCNC: 31.3 GM/DL — LOW (ref 32–36)
MCV RBC AUTO: 94.8 FL — SIGNIFICANT CHANGE UP (ref 80–100)
NRBC # BLD: 0 /100 WBCS — SIGNIFICANT CHANGE UP (ref 0–0)
PHOSPHATE SERPL-MCNC: 2.1 MG/DL — LOW (ref 2.5–4.5)
PLATELET # BLD AUTO: 290 K/UL — SIGNIFICANT CHANGE UP (ref 150–400)
POTASSIUM SERPL-MCNC: 4.6 MMOL/L — SIGNIFICANT CHANGE UP (ref 3.5–5.3)
POTASSIUM SERPL-SCNC: 4.6 MMOL/L — SIGNIFICANT CHANGE UP (ref 3.5–5.3)
PROT SERPL-MCNC: 6.1 G/DL — SIGNIFICANT CHANGE UP (ref 6–8.3)
RBC # BLD: 3.47 M/UL — LOW (ref 4.2–5.8)
RBC # FLD: 13.9 % — SIGNIFICANT CHANGE UP (ref 10.3–14.5)
SODIUM SERPL-SCNC: 137 MMOL/L — SIGNIFICANT CHANGE UP (ref 135–145)
WBC # BLD: 7.8 K/UL — SIGNIFICANT CHANGE UP (ref 3.8–10.5)
WBC # FLD AUTO: 7.8 K/UL — SIGNIFICANT CHANGE UP (ref 3.8–10.5)

## 2022-03-20 RX ADMIN — HYDROMORPHONE HYDROCHLORIDE 30 MILLILITER(S): 2 INJECTION INTRAMUSCULAR; INTRAVENOUS; SUBCUTANEOUS at 19:29

## 2022-03-20 RX ADMIN — Medication 1: at 01:43

## 2022-03-20 RX ADMIN — PANTOPRAZOLE SODIUM 40 MILLIGRAM(S): 20 TABLET, DELAYED RELEASE ORAL at 12:49

## 2022-03-20 RX ADMIN — DEXTROSE MONOHYDRATE, SODIUM CHLORIDE, AND POTASSIUM CHLORIDE 125 MILLILITER(S): 50; .745; 4.5 INJECTION, SOLUTION INTRAVENOUS at 11:00

## 2022-03-20 RX ADMIN — HEPARIN SODIUM 5000 UNIT(S): 5000 INJECTION INTRAVENOUS; SUBCUTANEOUS at 21:38

## 2022-03-20 RX ADMIN — Medication 83.33 MILLIMOLE(S): at 15:32

## 2022-03-20 RX ADMIN — Medication 100 MILLIGRAM(S): at 05:38

## 2022-03-20 RX ADMIN — HEPARIN SODIUM 5000 UNIT(S): 5000 INJECTION INTRAVENOUS; SUBCUTANEOUS at 15:32

## 2022-03-20 RX ADMIN — HYDROMORPHONE HYDROCHLORIDE 30 MILLILITER(S): 2 INJECTION INTRAMUSCULAR; INTRAVENOUS; SUBCUTANEOUS at 07:35

## 2022-03-20 RX ADMIN — HEPARIN SODIUM 5000 UNIT(S): 5000 INJECTION INTRAVENOUS; SUBCUTANEOUS at 05:44

## 2022-03-20 NOTE — PROVIDER CONTACT NOTE (OTHER) - ACTION/TREATMENT ORDERED:
No interventions at this time. Will continue to monitor patient. Pain management encouraged.
Cont. to monitor.
Rectal temp/stat labs, chest xray.
STAT EKG ordered. will continue to monitor

## 2022-03-20 NOTE — PROGRESS NOTE ADULT - ASSESSMENT
6y with PMH of HTN , Covid in August of 2021, Status post Dev procedure for perforated diverticulitis in 9/21@ [facility] / discharged home with an extended course of antibiotic and required wound VAC placement which was removed . Pt noted with abdominal bulge, c/o abdominal pain/discomfort noted with abdominal wall hernia. Patient now s/p Dev reversal and repair of midline abdominal wall defect (3/15). Patient requiring pressor support secondary to administration of epidural bupivacaine. Pain now controlled and being weaned off ben.     PLAN:  - Continue to monitor wound vac output.   - Pain control as per Pain and primary team (pt complaining of PCEA).  * - FU left lower extremity weakness. Present since spinal epidural was placed.   - Encourage IS  - Nausea control PRN  - Monitor vitals  - Diet: IVF + NPO w sips  - Monitor I+Os  - OOB/ Ambulate/ PT  - DVT ppx  - Primary care per red team surgery    Green Team Surgery, [phone]

## 2022-03-20 NOTE — PROVIDER CONTACT NOTE (OTHER) - SITUATION
Temp 100.9    Increased abdominal redness near wound vac
Patient is tachycardic (110)
BP 90/51
Elevated HR (110)

## 2022-03-20 NOTE — PROVIDER CONTACT NOTE (OTHER) - ASSESSMENT
Patient is tachycardic (110) Patient abdomen is distended and patient is complaining of abdominal discomfort. All other VSS.
Pt in bed, resting. Unable to move L foot off bed r/t epidural. Otherwise VSS, asymptomatic.
Increased redness near abdominal incisions. Wound vac in place. Pt warm to touch. Pt denies pain, SOB, NV, discomfort.
Patient has elevated heart rate (11) due to 10/10 abdominal pain. All other VSS stable.

## 2022-03-20 NOTE — PROVIDER CONTACT NOTE (OTHER) - REASON
elevated HR (110)
BP 90/51
Patient Tachycardic (110)
Temp 100.9    Increased abdominal redness near wound vac

## 2022-03-20 NOTE — PROVIDER CONTACT NOTE (OTHER) - NAME OF MD/NP/PA/DO NOTIFIED:
There are no contraindications to surgery.  EKG today shows normal sinus rhythm with nondiagnostic inferior ST changes.  There is no prior EKG for comparison.  Suggest pseudoephedrine 30 mg decongestant tablets during the daytime for nasal congestion and using Afrin nasal spray at bedtime as needed.  
MD Knowles
MD Hiren
MD Amin
MD Amin

## 2022-03-20 NOTE — PROGRESS NOTE ADULT - SUBJECTIVE AND OBJECTIVE BOX
Day 5 of Anesthesia Pain Management Service    SUBJECTIVE: Patient is doing well with IV PCA    Pain Scale Score:	[X] Refer to charted pain scores    THERAPY:    [ ] IV PCA Morphine		[ ] 5 mg/mL	[ ] 1 mg/mL  [X] IV PCA Hydromorphone	[ ] 5 mg/mL	[X] 1 mg/mL  [ ] IV PCA Fentanyl		[ ] 50 micrograms/mL    Demand dose: 0.25 mg     Lockout: 6 minutes   Continuous Rate: 0 mg/hr  4 Hour Limit: 4 mg    MEDICATIONS  (STANDING):  bupivacaine  0.25%/epinephrine [id number] Injectable 10 milliLiter(s) Local Injection once  dextrose 40% Gel 15 Gram(s) Oral once  dextrose 5% + sodium chloride 0.45% with potassium chloride 20 mEq/L 1000 milliLiter(s) (125 mL/Hr) IV Continuous <Continuous>  glucagon  Injectable 1 milliGRAM(s) IntraMuscular once  glucagon  Injectable 1 milliGRAM(s) IntraMuscular once  heparin   Injectable 5000 Unit(s) SubCutaneous every 8 hours  HYDROmorphone PCA (1 mG/mL) 30 milliLiter(s) PCA Continuous PCA Continuous  insulin lispro (ADMELOG) corrective regimen sliding scale   SubCutaneous every 6 hours  pantoprazole  Injectable 40 milliGRAM(s) IV Push daily    MEDICATIONS  (PRN):  cyclobenzaprine 5 milliGRAM(s) Oral three times a day PRN Muscle Spasm  HYDROmorphone PCA (1 mG/mL) Rescue Clinician Bolus 0.5 milliGRAM(s) IV Push every 15 minutes PRN for Pain Scale GREATER THAN 6  naloxone Injectable 0.1 milliGRAM(s) IV Push every 3 minutes PRN For ANY of the following changes in patient status:  A. RR LESS THAN 10 breaths per minute, B. Oxygen saturation LESS THAN 90%, C. Sedation score of 6  ondansetron Injectable 4 milliGRAM(s) IV Push every 6 hours PRN Nausea      OBJECTIVE:    Sedation Score:	[ X] Alert	[ ] Drowsy 	[ ] Arousable	[ ] Asleep	[ ] Unresponsive    Side Effects:	[X ] None	[ ] Nausea	[ ] Vomiting	[ ] Pruritus  		[ ] Other:    Vital Signs Last 24 Hrs  T(C): 36.3 (20 Mar 2022 08:26), Max: 38.3 (19 Mar 2022 20:22)  T(F): 97.4 (20 Mar 2022 08:26), Max: 100.9 (19 Mar 2022 20:22)  HR: 94 (20 Mar 2022 08:26) (94 - 110)  BP: 131/78 (20 Mar 2022 08:26) (105/89 - 148/81)  BP(mean): --  RR: 18 (20 Mar 2022 08:26) (18 - 18)  SpO2: 93% (20 Mar 2022 08:26) (91% - 99%)    ASSESSMENT/ PLAN    Therapy to  be:               [X] Continued   [ ] Discontinued   [ ] Changed to PRN Analgesics    Documentation and Verification of current medications:   [X] Done	[ ] Not done, not eligible    Comments: Patient appears much improved today, ambulating, in significantly less pain/distention.  Counseled on use of PCA.

## 2022-03-20 NOTE — PROGRESS NOTE ADULT - SUBJECTIVE AND OBJECTIVE BOX
Surgery Progress Note    Subjective:     Patient seen and examined at bedside. Vac change yesterday, resident at bedside with photos. Persistently -/- bowl function. Overnight spiked temperature to 101, fever workup ordered. Clinda IV abx started  OBJECTIVE:     T(C): 37.4 (03-19-22 @ 22:30), Max: 38.3 (03-19-22 @ 20:22)  HR: 99 (03-19-22 @ 22:30) (83 - 110)  BP: 107/75 (03-19-22 @ 22:30) (105/89 - 148/81)  RR: 18 (03-19-22 @ 22:30) (18 - 18)  SpO2: 94% (03-19-22 @ 22:30) (91% - 99%)  Wt(kg): --    I&O's Detail    18 Mar 2022 07:01  -  19 Mar 2022 07:00  --------------------------------------------------------  IN:    dextrose 5% + sodium chloride 0.45% w/ Additives: 1250 mL    IV PiggyBack: 500 mL  Total IN: 1750 mL    OUT:    Oral Fluid: 0 mL    Voided (mL): 1025 mL  Total OUT: 1025 mL    Total NET: 725 mL      19 Mar 2022 07:01  -  20 Mar 2022 00:18  --------------------------------------------------------  IN:    dextrose 5% + sodium chloride 0.45% w/ Additives: 1250 mL  Total IN: 1250 mL    OUT:    Oral Fluid: 0 mL    Voided (mL): 875 mL  Total OUT: 875 mL    Total NET: 375 mL          PHYSICAL EXAM:    GENERAL: NAD, lying in bed comfortably. Pleasant and responsive  CHEST/LUNG: Unlabored respirations. Comfortable on room air  ABDOMEN: Severely distended without peritoneal signs. Vac along L shaped incision. Interval increase in erythema along margins of both wounds.        MEDICATIONS  (STANDING):  bupivacaine  0.25%/epinephrine [id number] Injectable 10 milliLiter(s) Local Injection once  clindamycin IVPB      clindamycin IVPB 600 milliGRAM(s) IV Intermittent every 8 hours  dextrose 40% Gel 15 Gram(s) Oral once  dextrose 5% + sodium chloride 0.45% with potassium chloride 20 mEq/L 1000 milliLiter(s) (125 mL/Hr) IV Continuous <Continuous>  glucagon  Injectable 1 milliGRAM(s) IntraMuscular once  glucagon  Injectable 1 milliGRAM(s) IntraMuscular once  heparin   Injectable 5000 Unit(s) SubCutaneous every 8 hours  HYDROmorphone PCA (1 mG/mL) 30 milliLiter(s) PCA Continuous PCA Continuous  insulin lispro (ADMELOG) corrective regimen sliding scale   SubCutaneous every 6 hours  pantoprazole  Injectable 40 milliGRAM(s) IV Push daily    MEDICATIONS  (PRN):  cyclobenzaprine 5 milliGRAM(s) Oral three times a day PRN Muscle Spasm  HYDROmorphone PCA (1 mG/mL) Rescue Clinician Bolus 0.5 milliGRAM(s) IV Push every 15 minutes PRN for Pain Scale GREATER THAN 6  naloxone Injectable 0.1 milliGRAM(s) IV Push every 3 minutes PRN For ANY of the following changes in patient status:  A. RR LESS THAN 10 breaths per minute, B. Oxygen saturation LESS THAN 90%, C. Sedation score of 6  ondansetron Injectable 4 milliGRAM(s) IV Push every 6 hours PRN Nausea      LABS:                          11.2   8.45  )-----------( 299      ( 19 Mar 2022 10:30 )             35.3     03-19    133<L>  |  99  |  10  ----------------------------<  133<H>  4.2   |  26  |  0.72    Ca    8.7      19 Mar 2022 10:30  Phos  2.2     03-19  Mg     2.2     03-19

## 2022-03-20 NOTE — PROGRESS NOTE ADULT - ATTENDING COMMENTS
Pain Management Attending Addendum    SUBJECTIVE: Patient doing well with IV PCA    Therapy:    [X] IV PCA         [ ] PRN Analgesics    OBJECTIVE:   [X] Pain appropriately controlled    [ ] Other:    Side Effects:  [X] None	             [ ] Nausea              [ ] Pruritis                	[ ] Other:    ASSESSMENT/PLAN: Continue current therapy    Comments:
I saw and examined the patient, and reviewed  the history with the patient and   Agree with note which was also reviewed and edited where appropriate.  D/W patient, RN, residents and Fellow
see above
Low grade fever overnight w/ fever work-up performed  No real bowel function yet    abd soft, obese and distended, wound vac in place w/ seal    - continue CLD  - hold on abx at this time  - encourage IS and OOB for pulm toilet  - f/u cxs  - encourage ambulation    Gracie Rashid MD  Attending Physician

## 2022-03-20 NOTE — PROGRESS NOTE ADULT - ASSESSMENT
56y with PMH of HTN , Covid in August of 2021, Status post Dev procedure for perforated diverticulitis in 9/21@ [facility] / discharged home with an extended course of antibiotic and required wound VAC placement which was removed . Pt noted with abdominal bulge, c/o abdominal pain/discomfort noted with abdominal wall hernia. Patient now s/p Dev reversal and repair of midline abdominal wall defect (3/15). Patient requiring pressor support secondary to administration of epidural bupivacaine. Pain now controlled and being weaned off ben.     PLAN:  - Continue to monitor wound vac output - possibly recommend wet to dry dressing changes in place of vac temporarily in setting of fevers  - Pain control as per Pain and primary team (pt complaining of PCEA)  - Encourage IS  - Nausea control PRN  - Monitor vitals  - Diet: IVF + NPO w sips  - Monitor I+Os  - OOB/ Ambulate/ PT  - DVT ppx  - Primary care per red team surgery    Green Team Surgery, [phone]

## 2022-03-20 NOTE — PROGRESS NOTE ADULT - SUBJECTIVE AND OBJECTIVE BOX
SURGERY DAILY PROGRESS NOTE:       SUBJECTIVE/ROS: Overnight, patient was febrile to 101 and low grade tachycardic. Clindamycin was started overnight 2/2 slight erythema surrounding incision sites. Patient seen and examined at bedside during morning rounds. No complaints of abdominal pain, nausea or emesis.    OBJECTIVE:    Vital Signs Last 24 Hrs  T(C): 36.3 (20 Mar 2022 08:26), Max: 38.3 (19 Mar 2022 20:22)  T(F): 97.4 (20 Mar 2022 08:26), Max: 100.9 (19 Mar 2022 20:22)  HR: 94 (20 Mar 2022 08:26) (94 - 110)  BP: 131/78 (20 Mar 2022 08:26) (105/89 - 148/81)  BP(mean): --  RR: 18 (20 Mar 2022 08:26) (18 - 18)  SpO2: 93% (20 Mar 2022 08:26) (91% - 99%)    I&O's Detail    19 Mar 2022 07:01  -  20 Mar 2022 07:00  --------------------------------------------------------  IN:    dextrose 5% + sodium chloride 0.45% w/ Additives: 2750 mL    IV PiggyBack: 400 mL  Total IN: 3150 mL    OUT:    Oral Fluid: 0 mL    Voided (mL): 1275 mL  Total OUT: 1275 mL    Total NET: 1875 mL    PHYSICAL EXAM:  GENERAL: NAD, lying in bed comfortably. Pleasant and responsive  CHEST/LUNG: Unlabored respirations. Comfortable on room air  ABDOMEN: Severely distended without peritoneal signs. Vac along L shaped incision. Slight erythema along margins of both wounds.    LABS:                        10.3   7.80  )-----------( 290      ( 20 Mar 2022 08:34 )             32.9     03-20    137  |  101  |  9   ----------------------------<  141<H>  4.6   |  27  |  0.72    Ca    8.5      20 Mar 2022 08:34  Phos  2.1     03-20  Mg     2.0     -20    TPro  6.1  /  Alb  3.3  /  TBili  0.7  /  DBili  x   /  AST  23  /  ALT  27  /  AlkPhos  66  -20      Urinalysis Basic - ( 19 Mar 2022 22:25 )    Color: Yellow / Appearance: Clear / S.025 / pH: x  Gluc: x / Ketone: Small  / Bili: Negative / Urobili: Negative   Blood: x / Protein: 100 / Nitrite: Negative   Leuk Esterase: Negative / RBC: 42 /hpf / WBC 14 /HPF   Sq Epi: x / Non Sq Epi: 3 /hpf / Bacteria: Negative

## 2022-03-20 NOTE — PROGRESS NOTE ADULT - SUBJECTIVE AND OBJECTIVE BOX
SURGERY DAILY PROGRESS NOTE:       SUBJECTIVE/ROS: Patient was seen and examined at bedside. Patient  febrile to 100.9 overnight and had low grade tachycardia. Clindamycin was started overnight 2/2 concern for slight incisional erythema, but was discontinued in the AM as the erythema has improved. No complaints of abdominal pain, nausea or emesis. Denies passing flatus, and has not had any bowel movements.    OBJECTIVE:     Vital Signs Last 24 Hrs  T(C): 36.3 (20 Mar 2022 08:26), Max: 38.3 (19 Mar 2022 20:22)  T(F): 97.4 (20 Mar 2022 08:26), Max: 100.9 (19 Mar 2022 20:22)  HR: 94 (20 Mar 2022 08:26) (94 - 110)  BP: 131/78 (20 Mar 2022 08:26) (105/89 - 136/84)  BP(mean): --  RR: 18 (20 Mar 2022 08:26) (18 - 18)  SpO2: 93% (20 Mar 2022 08:26) (92% - 99%)    I&O's Detail    19 Mar 2022 07:01  -  20 Mar 2022 07:00  --------------------------------------------------------  IN:    dextrose 5% + sodium chloride 0.45% w/ Additives: 2750 mL    IV PiggyBack: 400 mL  Total IN: 3150 mL    OUT:    Oral Fluid: 0 mL    Voided (mL): 1275 mL  Total OUT: 1275 mL    Total NET: 1875 mL          PHYSICAL EXAM:  GENERAL: NAD, patient sitting comfortably in chair  CHEST/LUNG: Nonlabored breathing  ABDOMEN: Distended, soft, mildly tender. Vac in place. Slight erythema along margins of both wounds.    LABS:                        10.3   7.80  )-----------( 290      ( 20 Mar 2022 08:34 )             32.9     03-    137  |  101  |  9   ----------------------------<  141<H>  4.6   |  27  |  0.72    Ca    8.5      20 Mar 2022 08:34  Phos  2.1     -  Mg     2.0     -    TPro  6.1  /  Alb  3.3  /  TBili  0.7  /  DBili  x   /  AST  23  /  ALT  27  /  AlkPhos  66  -      Urinalysis Basic - ( 19 Mar 2022 22:25 )    Color: Yellow / Appearance: Clear / S.025 / pH: x  Gluc: x / Ketone: Small  / Bili: Negative / Urobili: Negative   Blood: x / Protein: 100 / Nitrite: Negative   Leuk Esterase: Negative / RBC: 42 /hpf / WBC 14 /HPF   Sq Epi: x / Non Sq Epi: 3 /hpf / Bacteria: Negative                       Red team SURGERY DAILY PROGRESS NOTE:       SUBJECTIVE/ROS: Patient was seen and examined at bedside. Patient  febrile to 100.9 overnight and had low grade tachycardia. Clindamycin was started overnight 2/2 concern for slight incisional erythema, but was discontinued in the AM as the erythema has improved. No complaints of abdominal pain, nausea or emesis. Denies passing flatus, and has not had any bowel movements.    OBJECTIVE:     Vital Signs Last 24 Hrs  T(C): 36.3 (20 Mar 2022 08:26), Max: 38.3 (19 Mar 2022 20:22)  T(F): 97.4 (20 Mar 2022 08:26), Max: 100.9 (19 Mar 2022 20:22)  HR: 94 (20 Mar 2022 08:26) (94 - 110)  BP: 131/78 (20 Mar 2022 08:26) (105/89 - 136/84)  BP(mean): --  RR: 18 (20 Mar 2022 08:26) (18 - 18)  SpO2: 93% (20 Mar 2022 08:26) (92% - 99%)    I&O's Detail    19 Mar 2022 07:01  -  20 Mar 2022 07:00  --------------------------------------------------------  IN:    dextrose 5% + sodium chloride 0.45% w/ Additives: 2750 mL    IV PiggyBack: 400 mL  Total IN: 3150 mL    OUT:    Oral Fluid: 0 mL    Voided (mL): 1275 mL  Total OUT: 1275 mL    Total NET: 1875 mL          PHYSICAL EXAM:  GENERAL: NAD, patient sitting comfortably in chair  CHEST/LUNG: Nonlabored breathing  ABDOMEN: Distended, soft, mildly tender. Vac in place. Slight erythema along margins of both wounds.    LABS:                        10.3   7.80  )-----------( 290      ( 20 Mar 2022 08:34 )             32.9     03-    137  |  101  |  9   ----------------------------<  141<H>  4.6   |  27  |  0.72    Ca    8.5      20 Mar 2022 08:34  Phos  2.1     -20  Mg     2.0     -20    TPro  6.1  /  Alb  3.3  /  TBili  0.7  /  DBili  x   /  AST  23  /  ALT  27  /  AlkPhos  66  -      Urinalysis Basic - ( 19 Mar 2022 22:25 )    Color: Yellow / Appearance: Clear / S.025 / pH: x  Gluc: x / Ketone: Small  / Bili: Negative / Urobili: Negative   Blood: x / Protein: 100 / Nitrite: Negative   Leuk Esterase: Negative / RBC: 42 /hpf / WBC 14 /HPF   Sq Epi: x / Non Sq Epi: 3 /hpf / Bacteria: Negative

## 2022-03-20 NOTE — PROVIDER CONTACT NOTE (OTHER) - BACKGROUND
Patient was admitted for Deleon reversal and hernia repair.
Pt admitted from PACU following a Dev's reversal, incisional hernia repair on 3/15.
s/p Hartmans reversal and hernia repair 3/15
Patient was admitted for Deleon reversal and incisional hernia repair.

## 2022-03-20 NOTE — PROGRESS NOTE ADULT - ASSESSMENT
56y with PMH of HTN , Covid in August of 2021, Status post Dev procedure for perforated diverticulitis in 9/21@ [facility] / discharged home with an extended course of antibiotic and required wound VAC placement which was removed . Pt noted with abdominal bulge, c/o abdominal pain/discomfort noted with abdominal wall hernia. Patient now s/p Dev reversal and repair of midline abdominal wall defect (3/15). Patient requiring pressor support secondary to administration of epidural bupivacaine. Pain now controlled, patient recovering at the floor, but has not had return of bowel function postoperatively.    PLAN:  - Appreciate Green team recs re wound care: Recommend wet to dry dressing changes in place of vac temporarily in setting of fevers  - Pain control   - Incentive spirometry  - Monitor vitals  - Monitor bowel function  - Diet:  NPO w sips  - Monitor I+Os  - OOB/ Ambulate   - DVT ppx      Red team  [id number]

## 2022-03-21 LAB
ALBUMIN SERPL ELPH-MCNC: 3.2 G/DL — LOW (ref 3.3–5)
ALP SERPL-CCNC: 73 U/L — SIGNIFICANT CHANGE UP (ref 40–120)
ALT FLD-CCNC: 32 U/L — SIGNIFICANT CHANGE UP (ref 10–45)
ANION GAP SERPL CALC-SCNC: 11 MMOL/L — SIGNIFICANT CHANGE UP (ref 5–17)
AST SERPL-CCNC: 28 U/L — SIGNIFICANT CHANGE UP (ref 10–40)
BILIRUB SERPL-MCNC: 0.7 MG/DL — SIGNIFICANT CHANGE UP (ref 0.2–1.2)
BUN SERPL-MCNC: 6 MG/DL — LOW (ref 7–23)
CALCIUM SERPL-MCNC: 8.8 MG/DL — SIGNIFICANT CHANGE UP (ref 8.4–10.5)
CHLORIDE SERPL-SCNC: 100 MMOL/L — SIGNIFICANT CHANGE UP (ref 96–108)
CO2 SERPL-SCNC: 24 MMOL/L — SIGNIFICANT CHANGE UP (ref 22–31)
CREAT SERPL-MCNC: 0.69 MG/DL — SIGNIFICANT CHANGE UP (ref 0.5–1.3)
EGFR: 108 ML/MIN/1.73M2 — SIGNIFICANT CHANGE UP
GLUCOSE BLDC GLUCOMTR-MCNC: 106 MG/DL — HIGH (ref 70–99)
GLUCOSE BLDC GLUCOMTR-MCNC: 138 MG/DL — HIGH (ref 70–99)
GLUCOSE BLDC GLUCOMTR-MCNC: 140 MG/DL — HIGH (ref 70–99)
GLUCOSE BLDC GLUCOMTR-MCNC: 154 MG/DL — HIGH (ref 70–99)
GLUCOSE BLDC GLUCOMTR-MCNC: 165 MG/DL — HIGH (ref 70–99)
GLUCOSE SERPL-MCNC: 136 MG/DL — HIGH (ref 70–99)
HCT VFR BLD CALC: 32.6 % — LOW (ref 39–50)
HGB BLD-MCNC: 10.3 G/DL — LOW (ref 13–17)
MAGNESIUM SERPL-MCNC: 1.9 MG/DL — SIGNIFICANT CHANGE UP (ref 1.6–2.6)
MCHC RBC-ENTMCNC: 30.1 PG — SIGNIFICANT CHANGE UP (ref 27–34)
MCHC RBC-ENTMCNC: 31.6 GM/DL — LOW (ref 32–36)
MCV RBC AUTO: 95.3 FL — SIGNIFICANT CHANGE UP (ref 80–100)
NRBC # BLD: 0 /100 WBCS — SIGNIFICANT CHANGE UP (ref 0–0)
PHOSPHATE SERPL-MCNC: 2.9 MG/DL — SIGNIFICANT CHANGE UP (ref 2.5–4.5)
PLATELET # BLD AUTO: 298 K/UL — SIGNIFICANT CHANGE UP (ref 150–400)
POTASSIUM SERPL-MCNC: 4.7 MMOL/L — SIGNIFICANT CHANGE UP (ref 3.5–5.3)
POTASSIUM SERPL-SCNC: 4.7 MMOL/L — SIGNIFICANT CHANGE UP (ref 3.5–5.3)
PROT SERPL-MCNC: 6.4 G/DL — SIGNIFICANT CHANGE UP (ref 6–8.3)
RBC # BLD: 3.42 M/UL — LOW (ref 4.2–5.8)
RBC # FLD: 14.2 % — SIGNIFICANT CHANGE UP (ref 10.3–14.5)
SODIUM SERPL-SCNC: 135 MMOL/L — SIGNIFICANT CHANGE UP (ref 135–145)
WBC # BLD: 9.65 K/UL — SIGNIFICANT CHANGE UP (ref 3.8–10.5)
WBC # FLD AUTO: 9.65 K/UL — SIGNIFICANT CHANGE UP (ref 3.8–10.5)

## 2022-03-21 RX ORDER — OXYCODONE HYDROCHLORIDE 5 MG/1
2.5 TABLET ORAL EVERY 4 HOURS
Refills: 0 | Status: DISCONTINUED | OUTPATIENT
Start: 2022-03-21 | End: 2022-03-23

## 2022-03-21 RX ORDER — DEXTROSE 50 % IN WATER 50 %
25 SYRINGE (ML) INTRAVENOUS ONCE
Refills: 0 | Status: DISCONTINUED | OUTPATIENT
Start: 2022-03-21 | End: 2022-03-23

## 2022-03-21 RX ORDER — ACETAMINOPHEN 500 MG
1000 TABLET ORAL EVERY 6 HOURS
Refills: 0 | Status: DISCONTINUED | OUTPATIENT
Start: 2022-03-21 | End: 2022-03-21

## 2022-03-21 RX ORDER — MAGNESIUM SULFATE 500 MG/ML
2 VIAL (ML) INJECTION ONCE
Refills: 0 | Status: COMPLETED | OUTPATIENT
Start: 2022-03-21 | End: 2022-03-21

## 2022-03-21 RX ORDER — ACETAMINOPHEN 500 MG
1000 TABLET ORAL EVERY 6 HOURS
Refills: 0 | Status: COMPLETED | OUTPATIENT
Start: 2022-03-21 | End: 2022-03-22

## 2022-03-21 RX ORDER — INSULIN LISPRO 100/ML
VIAL (ML) SUBCUTANEOUS
Refills: 0 | Status: DISCONTINUED | OUTPATIENT
Start: 2022-03-21 | End: 2022-03-23

## 2022-03-21 RX ORDER — SODIUM CHLORIDE 9 MG/ML
1000 INJECTION, SOLUTION INTRAVENOUS
Refills: 0 | Status: DISCONTINUED | OUTPATIENT
Start: 2022-03-21 | End: 2022-03-23

## 2022-03-21 RX ORDER — INSULIN LISPRO 100/ML
VIAL (ML) SUBCUTANEOUS AT BEDTIME
Refills: 0 | Status: DISCONTINUED | OUTPATIENT
Start: 2022-03-21 | End: 2022-03-23

## 2022-03-21 RX ORDER — HYDROMORPHONE HYDROCHLORIDE 2 MG/ML
0.5 INJECTION INTRAMUSCULAR; INTRAVENOUS; SUBCUTANEOUS ONCE
Refills: 0 | Status: DISCONTINUED | OUTPATIENT
Start: 2022-03-21 | End: 2022-03-22

## 2022-03-21 RX ORDER — DEXTROSE 50 % IN WATER 50 %
12.5 SYRINGE (ML) INTRAVENOUS ONCE
Refills: 0 | Status: DISCONTINUED | OUTPATIENT
Start: 2022-03-21 | End: 2022-03-23

## 2022-03-21 RX ORDER — OXYCODONE HYDROCHLORIDE 5 MG/1
5 TABLET ORAL EVERY 4 HOURS
Refills: 0 | Status: DISCONTINUED | OUTPATIENT
Start: 2022-03-21 | End: 2022-03-23

## 2022-03-21 RX ADMIN — OXYCODONE HYDROCHLORIDE 5 MILLIGRAM(S): 5 TABLET ORAL at 15:14

## 2022-03-21 RX ADMIN — HYDROMORPHONE HYDROCHLORIDE 30 MILLILITER(S): 2 INJECTION INTRAMUSCULAR; INTRAVENOUS; SUBCUTANEOUS at 07:13

## 2022-03-21 RX ADMIN — OXYCODONE HYDROCHLORIDE 5 MILLIGRAM(S): 5 TABLET ORAL at 11:03

## 2022-03-21 RX ADMIN — Medication 400 MILLIGRAM(S): at 12:32

## 2022-03-21 RX ADMIN — Medication 1000 MILLIGRAM(S): at 18:33

## 2022-03-21 RX ADMIN — Medication 400 MILLIGRAM(S): at 18:03

## 2022-03-21 RX ADMIN — HEPARIN SODIUM 5000 UNIT(S): 5000 INJECTION INTRAVENOUS; SUBCUTANEOUS at 14:15

## 2022-03-21 RX ADMIN — Medication 1: at 00:16

## 2022-03-21 RX ADMIN — HEPARIN SODIUM 5000 UNIT(S): 5000 INJECTION INTRAVENOUS; SUBCUTANEOUS at 21:04

## 2022-03-21 RX ADMIN — DEXTROSE MONOHYDRATE, SODIUM CHLORIDE, AND POTASSIUM CHLORIDE 125 MILLILITER(S): 50; .745; 4.5 INJECTION, SOLUTION INTRAVENOUS at 14:15

## 2022-03-21 RX ADMIN — Medication 1000 MILLIGRAM(S): at 23:55

## 2022-03-21 RX ADMIN — PANTOPRAZOLE SODIUM 40 MILLIGRAM(S): 20 TABLET, DELAYED RELEASE ORAL at 11:13

## 2022-03-21 RX ADMIN — OXYCODONE HYDROCHLORIDE 5 MILLIGRAM(S): 5 TABLET ORAL at 21:04

## 2022-03-21 RX ADMIN — Medication 400 MILLIGRAM(S): at 23:40

## 2022-03-21 RX ADMIN — HEPARIN SODIUM 5000 UNIT(S): 5000 INJECTION INTRAVENOUS; SUBCUTANEOUS at 05:39

## 2022-03-21 RX ADMIN — OXYCODONE HYDROCHLORIDE 5 MILLIGRAM(S): 5 TABLET ORAL at 14:44

## 2022-03-21 RX ADMIN — Medication 62.5 MILLIMOLE(S): at 11:13

## 2022-03-21 RX ADMIN — Medication 25 GRAM(S): at 10:20

## 2022-03-21 RX ADMIN — OXYCODONE HYDROCHLORIDE 5 MILLIGRAM(S): 5 TABLET ORAL at 21:20

## 2022-03-21 RX ADMIN — Medication 1000 MILLIGRAM(S): at 13:02

## 2022-03-21 RX ADMIN — OXYCODONE HYDROCHLORIDE 5 MILLIGRAM(S): 5 TABLET ORAL at 10:33

## 2022-03-21 NOTE — PROGRESS NOTE ADULT - ASSESSMENT
56y with PMH of HTN , Covid in August of 2021, Status post Dev procedure for perforated diverticulitis in 9/21@ [facility] / discharged home with an extended course of antibiotic and required wound VAC placement which was removed . Pt noted with abdominal bulge, c/o abdominal pain/discomfort noted with abdominal wall hernia. Patient now s/p Dev reversal and repair of midline abdominal wall defect (3/15). Patient requiring pressor support secondary to administration of epidural bupivacaine. Pain now controlled, patient recovering at the floor, but has not had return of bowel function postoperatively.    PLAN:  - Appreciate Green team recs re wound care: Recommend wet to dry dressing changes in place of vac temporarily in setting of fevers  - Pain control   - Incentive spirometry  - Monitor vitals  - Monitor bowel function  - Diet:  NPO w sips  - Monitor I+Os  - OOB/ Ambulate   - DVT ppx      Red team  [id number]   56y with PMH of HTN , Covid in August of 2021, Status post Dev procedure for perforated diverticulitis in 9/21@ [facility] / discharged home with an extended course of antibiotic and required wound VAC placement which was removed . Pt noted with abdominal bulge, c/o abdominal pain/discomfort noted with abdominal wall hernia. Patient now s/p Dev reversal and repair of midline abdominal wall defect (3/15). Patient requiring pressor support secondary to administration of epidural bupivacaine. Pain now controlled, patient recovering at the floor,     PLAN:  - Appreciate Green team recs re wound care: Recommend wet to dry dressing changes in place of vac temporarily in setting of fevers  - Pain control   - Incentive spirometry  - Monitor vitals  - Monitor bowel function  - Diet:  NPO w sips  - Monitor I+Os  - OOB/ Ambulate   - DVT ppx      Red team  [id number]   56y with PMH of HTN , Covid in August of 2021, Status post Dev procedure for perforated diverticulitis in 9/21@ [facility] / discharged home with an extended course of antibiotic and required wound VAC placement which was removed . Pt noted with abdominal bulge, c/o abdominal pain/discomfort noted with abdominal wall hernia. Patient now s/p Dev reversal and repair of midline abdominal wall defect (3/15). Patient requiring pressor support secondary to administration of epidural bupivacaine. Pain now controlled, patient recovering at the floor,     PLAN:  - Appreciate Green team recs re wound care: Recommend wet to dry dressing changes in place of vac temporarily in setting of fevers  - DC PCA 3/21  - Pain control   - Incentive spirometry  - Monitor vitals  - Monitor bowel function  - Diet: CLD; Plan for LRD on Wednesday  - Monitor I+Os  - OOB/ Ambulate   - DVT ppx      Red team  [id number]

## 2022-03-21 NOTE — PROGRESS NOTE ADULT - SUBJECTIVE AND OBJECTIVE BOX
Red Team Surgery Progress Note      SUBJECTIVE: Patient seen and examined at bedside with surgical team. No acute events overnight.     OBJECTIVE:    Vital Signs Last 24 Hrs  T(C): 36.4 (20 Mar 2022 21:19), Max: 37.2 (20 Mar 2022 04:25)  T(F): 97.5 (20 Mar 2022 21:19), Max: 99 (20 Mar 2022 04:25)  HR: 98 (20 Mar 2022 21:19) (94 - 98)  BP: 125/80 (20 Mar 2022 21:19) (125/72 - 141/78)  BP(mean): --  RR: 16 (20 Mar 2022 21:19) (16 - 18)  SpO2: 95% (20 Mar 2022 21:19) (93% - 98%)I&O's Detail    19 Mar 2022 07:01  -  20 Mar 2022 07:00  --------------------------------------------------------  IN:    dextrose 5% + sodium chloride 0.45% w/ Additives: 2750 mL    IV PiggyBack: 400 mL  Total IN: 3150 mL    OUT:    Oral Fluid: 0 mL    Voided (mL): 1275 mL  Total OUT: 1275 mL    Total NET: 1875 mL      20 Mar 2022 07:01  -  21 Mar 2022 01:02  --------------------------------------------------------  IN:    dextrose 5% + sodium chloride 0.45% w/ Additives: 1500 mL    IV PiggyBack: 500 mL  Total IN: 2000 mL    OUT:    Oral Fluid: 0 mL    Voided (mL): 1500 mL  Total OUT: 1500 mL    Total NET: 500 mL      MEDICATIONS  (STANDING):  bupivacaine  0.25%/epinephrine [id number] Injectable 10 milliLiter(s) Local Injection once  dextrose 40% Gel 15 Gram(s) Oral once  dextrose 5% + sodium chloride 0.45% with potassium chloride 20 mEq/L 1000 milliLiter(s) (125 mL/Hr) IV Continuous <Continuous>  glucagon  Injectable 1 milliGRAM(s) IntraMuscular once  glucagon  Injectable 1 milliGRAM(s) IntraMuscular once  heparin   Injectable 5000 Unit(s) SubCutaneous every 8 hours  HYDROmorphone PCA (1 mG/mL) 30 milliLiter(s) PCA Continuous PCA Continuous  insulin lispro (ADMELOG) corrective regimen sliding scale   SubCutaneous every 6 hours  pantoprazole  Injectable 40 milliGRAM(s) IV Push daily    MEDICATIONS  (PRN):  cyclobenzaprine 5 milliGRAM(s) Oral three times a day PRN Muscle Spasm  HYDROmorphone PCA (1 mG/mL) Rescue Clinician Bolus 0.5 milliGRAM(s) IV Push every 15 minutes PRN for Pain Scale GREATER THAN 6  naloxone Injectable 0.1 milliGRAM(s) IV Push every 3 minutes PRN For ANY of the following changes in patient status:  A. RR LESS THAN 10 breaths per minute, B. Oxygen saturation LESS THAN 90%, C. Sedation score of 6  ondansetron Injectable 4 milliGRAM(s) IV Push every 6 hours PRN Nausea      PHYSICAL EXAM:  GENERAL: NAD,   CHEST/LUNG: Nonlabored breathing  ABDOMEN: Distended, soft, mildly tender. Vac in place. Slight erythema along margins of both wounds.    LABS:                        10.3   7.80  )-----------( 290      ( 20 Mar 2022 08:34 )             32.9     03-20    137  |  101  |  9   ----------------------------<  141<H>  4.6   |  27  |  0.72    Ca    8.5      20 Mar 2022 08:34  Phos  2.1     03-20  Mg     2.0     -20    TPro  6.1  /  Alb  3.3  /  TBili  0.7  /  DBili  x   /  AST  23  /  ALT  27  /  AlkPhos  66  03-20      LIVER FUNCTIONS - ( 20 Mar 2022 08:34 )  Alb: 3.3 g/dL / Pro: 6.1 g/dL / ALK PHOS: 66 U/L / ALT: 27 U/L / AST: 23 U/L / GGT: x           Urinalysis Basic - ( 19 Mar 2022 22:25 )    Color: Yellow / Appearance: Clear / S.025 / pH: x  Gluc: x / Ketone: Small  / Bili: Negative / Urobili: Negative   Blood: x / Protein: 100 / Nitrite: Negative   Leuk Esterase: Negative / RBC: 42 /hpf / WBC 14 /HPF   Sq Epi: x / Non Sq Epi: 3 /hpf / Bacteria: Negative           Red Team Surgery Progress Note      SUBJECTIVE: Patient seen and examined at bedside with surgical team. No acute events overnight. Endorses passing gas (4-5 times overnight) but not yet stool. Ambulating.     OBJECTIVE:    Vital Signs Last 24 Hrs  T(C): 36.4 (20 Mar 2022 21:19), Max: 37.2 (20 Mar 2022 04:25)  T(F): 97.5 (20 Mar 2022 21:19), Max: 99 (20 Mar 2022 04:25)  HR: 98 (20 Mar 2022 21:) (94 - 98)  BP: 125/80 (20 Mar 2022 21:19) (125/72 - 141/78)  BP(mean): --  RR: 16 (20 Mar 2022 21:) (16 - 18)  SpO2: 95% (20 Mar 2022 21:) (93% - 98%)I&O's Detail    19 Mar 2022 07:01  -  20 Mar 2022 07:00  --------------------------------------------------------  IN:    dextrose 5% + sodium chloride 0.45% w/ Additives: 2750 mL    IV PiggyBack: 400 mL  Total IN: 3150 mL    OUT:    Oral Fluid: 0 mL    Voided (mL): 1275 mL  Total OUT: 1275 mL    Total NET: 1875 mL      20 Mar 2022 07:01  -  21 Mar 2022 01:02  --------------------------------------------------------  IN:    dextrose 5% + sodium chloride 0.45% w/ Additives: 1500 mL    IV PiggyBack: 500 mL  Total IN: 2000 mL    OUT:    Oral Fluid: 0 mL    Voided (mL): 1500 mL  Total OUT: 1500 mL    Total NET: 500 mL      MEDICATIONS  (STANDING):  bupivacaine  0.25%/epinephrine 1:451981 Injectable 10 milliLiter(s) Local Injection once  dextrose 40% Gel 15 Gram(s) Oral once  dextrose 5% + sodium chloride 0.45% with potassium chloride 20 mEq/L 1000 milliLiter(s) (125 mL/Hr) IV Continuous <Continuous>  glucagon  Injectable 1 milliGRAM(s) IntraMuscular once  glucagon  Injectable 1 milliGRAM(s) IntraMuscular once  heparin   Injectable 5000 Unit(s) SubCutaneous every 8 hours  HYDROmorphone PCA (1 mG/mL) 30 milliLiter(s) PCA Continuous PCA Continuous  insulin lispro (ADMELOG) corrective regimen sliding scale   SubCutaneous every 6 hours  pantoprazole  Injectable 40 milliGRAM(s) IV Push daily    MEDICATIONS  (PRN):  cyclobenzaprine 5 milliGRAM(s) Oral three times a day PRN Muscle Spasm  HYDROmorphone PCA (1 mG/mL) Rescue Clinician Bolus 0.5 milliGRAM(s) IV Push every 15 minutes PRN for Pain Scale GREATER THAN 6  naloxone Injectable 0.1 milliGRAM(s) IV Push every 3 minutes PRN For ANY of the following changes in patient status:  A. RR LESS THAN 10 breaths per minute, B. Oxygen saturation LESS THAN 90%, C. Sedation score of 6  ondansetron Injectable 4 milliGRAM(s) IV Push every 6 hours PRN Nausea      PHYSICAL EXAM:  GENERAL: NAD,   CHEST/LUNG: Nonlabored breathing  ABDOMEN: Distended, soft, mildly tender. Vac in place. Slight erythema along margins of both wounds.    LABS:                        10.3   7.80  )-----------( 290      ( 20 Mar 2022 08:34 )             32.9     03-20    137  |  101  |  9   ----------------------------<  141<H>  4.6   |  27  |  0.72    Ca    8.5      20 Mar 2022 08:34  Phos  2.1     03-20  Mg     2.0     03-20    TPro  6.1  /  Alb  3.3  /  TBili  0.7  /  DBili  x   /  AST  23  /  ALT  27  /  AlkPhos  66  03-20      LIVER FUNCTIONS - ( 20 Mar 2022 08:34 )  Alb: 3.3 g/dL / Pro: 6.1 g/dL / ALK PHOS: 66 U/L / ALT: 27 U/L / AST: 23 U/L / GGT: x           Urinalysis Basic - ( 19 Mar 2022 22:25 )    Color: Yellow / Appearance: Clear / S.025 / pH: x  Gluc: x / Ketone: Small  / Bili: Negative / Urobili: Negative   Blood: x / Protein: 100 / Nitrite: Negative   Leuk Esterase: Negative / RBC: 42 /hpf / WBC 14 /HPF   Sq Epi: x / Non Sq Epi: 3 /hpf / Bacteria: Negative

## 2022-03-21 NOTE — PROGRESS NOTE ADULT - ASSESSMENT
56y with PMH of HTN , Covid in August of 2021, Status post Dev procedure for perforated diverticulitis in 9/21@ [facility] / discharged home with an extended course of antibiotic and required wound VAC placement which was removed . Pt noted with abdominal bulge, c/o abdominal pain/discomfort noted with abdominal wall hernia. Patient now s/p Dev reversal and repair of midline abdominal wall defect (3/15). Patient requiring pressor support secondary to administration of epidural bupivacaine. Pain now controlled and being weaned off ben.     PLAN:  - Continue to monitor wound vac output - possibly recommend wet to dry dressing changes in place of vac temporarily in setting of fevers  - Pain control as per Pain and primary team   - Encourage IS  - Nausea control PRN  - Monitor vitals  - Diet: IVF + NPO w sips  - Monitor I+Os  - OOB/ Ambulate/ PT  - DVT ppx  - Primary care per red team surgery    Green Team Surgery, [phone]   56y with PMH of HTN , Covid in August of 2021, Status post Dev procedure for perforated diverticulitis in 9/21@ [facility] / discharged home with an extended course of antibiotic and required wound VAC placement which was removed . Pt noted with abdominal bulge, c/o abdominal pain/discomfort noted with abdominal wall hernia. Patient now s/p Dev reversal and repair of midline abdominal wall defect (3/15).     PLAN:  - Continue to monitor wound vac output - possibly recommend wet to dry dressing changes in place of vac temporarily in setting of fevers  - Pain control as per Pain and primary team   - Encourage IS  - Nausea control PRN  - Monitor vitals  - Diet: IVF + NPO w sips  - Monitor I+Os  - OOB/ Ambulate/ PT  - DVT ppx  - Primary care per red team surgery    Green Team Surgery, [phone]   56y with PMH of HTN , Covid in August of 2021, Status post Dev procedure for perforated diverticulitis in 9/21@ [facility] / discharged home with an extended course of antibiotic and required wound VAC placement which was removed . Pt noted with abdominal bulge, c/o abdominal pain/discomfort noted with abdominal wall hernia. Patient now s/p Dev reversal and repair of midline abdominal wall defect (3/15).     PLAN:  - Continue to monitor wound vac output   - Pain control as per Pain and primary team   - Encourage IS  - Nausea control PRN  - Monitor vitals  - Diet: IVF + NPO w sips  - Monitor I+Os  - OOB/ Ambulate/ PT  - DVT ppx  - Primary care per red team surgery    Green Team Surgery, [phone]

## 2022-03-21 NOTE — PROGRESS NOTE ADULT - SUBJECTIVE AND OBJECTIVE BOX
Green Team Surgery Progress Note      SUBJECTIVE: Patient seen and examined at bedside with surgical team.     OBJECTIVE:    Vital Signs Last 24 Hrs  T(C): 36.4 (20 Mar 2022 21:19), Max: 37.2 (20 Mar 2022 04:25)  T(F): 97.5 (20 Mar 2022 21:19), Max: 99 (20 Mar 2022 04:25)  HR: 98 (20 Mar 2022 21:) (94 - 98)  BP: 125/80 (20 Mar 2022 21:19) (125/72 - 141/78)  BP(mean): --  RR: 16 (20 Mar 2022 21:19) (16 - 18)  SpO2: 95% (20 Mar 2022 21:) (93% - 98%)I&O's Detail    19 Mar 2022 07:01  -  20 Mar 2022 07:00  --------------------------------------------------------  IN:    dextrose 5% + sodium chloride 0.45% w/ Additives: 2750 mL    IV PiggyBack: 400 mL  Total IN: 3150 mL    OUT:    Oral Fluid: 0 mL    Voided (mL): 1275 mL  Total OUT: 1275 mL    Total NET: 1875 mL      20 Mar 2022 07:01  -  21 Mar 2022 00:15  --------------------------------------------------------  IN:    dextrose 5% + sodium chloride 0.45% w/ Additives: 1500 mL    IV PiggyBack: 500 mL  Total IN: 2000 mL    OUT:    Oral Fluid: 0 mL    Voided (mL): 1500 mL  Total OUT: 1500 mL    Total NET: 500 mL      MEDICATIONS  (STANDING):  bupivacaine  0.25%/epinephrine [id number] Injectable 10 milliLiter(s) Local Injection once  dextrose 40% Gel 15 Gram(s) Oral once  dextrose 5% + sodium chloride 0.45% with potassium chloride 20 mEq/L 1000 milliLiter(s) (125 mL/Hr) IV Continuous <Continuous>  glucagon  Injectable 1 milliGRAM(s) IntraMuscular once  glucagon  Injectable 1 milliGRAM(s) IntraMuscular once  heparin   Injectable 5000 Unit(s) SubCutaneous every 8 hours  HYDROmorphone PCA (1 mG/mL) 30 milliLiter(s) PCA Continuous PCA Continuous  insulin lispro (ADMELOG) corrective regimen sliding scale   SubCutaneous every 6 hours  pantoprazole  Injectable 40 milliGRAM(s) IV Push daily    MEDICATIONS  (PRN):  cyclobenzaprine 5 milliGRAM(s) Oral three times a day PRN Muscle Spasm  HYDROmorphone PCA (1 mG/mL) Rescue Clinician Bolus 0.5 milliGRAM(s) IV Push every 15 minutes PRN for Pain Scale GREATER THAN 6  naloxone Injectable 0.1 milliGRAM(s) IV Push every 3 minutes PRN For ANY of the following changes in patient status:  A. RR LESS THAN 10 breaths per minute, B. Oxygen saturation LESS THAN 90%, C. Sedation score of 6  ondansetron Injectable 4 milliGRAM(s) IV Push every 6 hours PRN Nausea      PHYSICAL EXAM:  GENERAL: NAD, lying in bed comfortably. Pleasant and responsive  CHEST/LUNG: Unlabored respirations. Comfortable on room air  ABDOMEN: Severely distended without peritoneal signs. Vac along L shaped incision. Slight erythema along margins of both wounds.    LABS:                        10.3   7.80  )-----------( 290      ( 20 Mar 2022 08:34 )             32.9     03-20    137  |  101  |  9   ----------------------------<  141<H>  4.6   |  27  |  0.72    Ca    8.5      20 Mar 2022 08:34  Phos  2.1     03-20  Mg     2.0     -20    TPro  6.1  /  Alb  3.3  /  TBili  0.7  /  DBili  x   /  AST  23  /  ALT  27  /  AlkPhos  66  03-20      LIVER FUNCTIONS - ( 20 Mar 2022 08:34 )  Alb: 3.3 g/dL / Pro: 6.1 g/dL / ALK PHOS: 66 U/L / ALT: 27 U/L / AST: 23 U/L / GGT: x           Urinalysis Basic - ( 19 Mar 2022 22:25 )    Color: Yellow / Appearance: Clear / S.025 / pH: x  Gluc: x / Ketone: Small  / Bili: Negative / Urobili: Negative   Blood: x / Protein: 100 / Nitrite: Negative   Leuk Esterase: Negative / RBC: 42 /hpf / WBC 14 /HPF   Sq Epi: x / Non Sq Epi: 3 /hpf / Bacteria: Negative           Green Team Surgery Progress Note      SUBJECTIVE: Patient seen and examined at bedside with surgical team. Passing gas, no BM. Redness around vac site improved. Bcx NGTD    OBJECTIVE:    Vital Signs Last 24 Hrs  T(C): 36.4 (20 Mar 2022 21:19), Max: 37.2 (20 Mar 2022 04:25)  T(F): 97.5 (20 Mar 2022 21:19), Max: 99 (20 Mar 2022 04:25)  HR: 98 (20 Mar 2022 21:) (94 - 98)  BP: 125/80 (20 Mar 2022 21:19) (125/72 - 141/78)  BP(mean): --  RR: 16 (20 Mar 2022 21:19) (16 - 18)  SpO2: 95% (20 Mar 2022 21:) (93% - 98%)I&O's Detail    19 Mar 2022 07:01  -  20 Mar 2022 07:00  --------------------------------------------------------  IN:    dextrose 5% + sodium chloride 0.45% w/ Additives: 2750 mL    IV PiggyBack: 400 mL  Total IN: 3150 mL    OUT:    Oral Fluid: 0 mL    Voided (mL): 1275 mL  Total OUT: 1275 mL    Total NET: 1875 mL      20 Mar 2022 07:01  -  21 Mar 2022 00:15  --------------------------------------------------------  IN:    dextrose 5% + sodium chloride 0.45% w/ Additives: 1500 mL    IV PiggyBack: 500 mL  Total IN: 2000 mL    OUT:    Oral Fluid: 0 mL    Voided (mL): 1500 mL  Total OUT: 1500 mL    Total NET: 500 mL      MEDICATIONS  (STANDING):  bupivacaine  0.25%/epinephrine [id number] Injectable 10 milliLiter(s) Local Injection once  dextrose 40% Gel 15 Gram(s) Oral once  dextrose 5% + sodium chloride 0.45% with potassium chloride 20 mEq/L 1000 milliLiter(s) (125 mL/Hr) IV Continuous <Continuous>  glucagon  Injectable 1 milliGRAM(s) IntraMuscular once  glucagon  Injectable 1 milliGRAM(s) IntraMuscular once  heparin   Injectable 5000 Unit(s) SubCutaneous every 8 hours  HYDROmorphone PCA (1 mG/mL) 30 milliLiter(s) PCA Continuous PCA Continuous  insulin lispro (ADMELOG) corrective regimen sliding scale   SubCutaneous every 6 hours  pantoprazole  Injectable 40 milliGRAM(s) IV Push daily    MEDICATIONS  (PRN):  cyclobenzaprine 5 milliGRAM(s) Oral three times a day PRN Muscle Spasm  HYDROmorphone PCA (1 mG/mL) Rescue Clinician Bolus 0.5 milliGRAM(s) IV Push every 15 minutes PRN for Pain Scale GREATER THAN 6  naloxone Injectable 0.1 milliGRAM(s) IV Push every 3 minutes PRN For ANY of the following changes in patient status:  A. RR LESS THAN 10 breaths per minute, B. Oxygen saturation LESS THAN 90%, C. Sedation score of 6  ondansetron Injectable 4 milliGRAM(s) IV Push every 6 hours PRN Nausea      PHYSICAL EXAM:  GENERAL: NAD, lying in bed comfortably. Pleasant and responsive  CHEST/LUNG: Unlabored respirations. Comfortable on room air  ABDOMEN: Severely distended without peritoneal signs. Vac along L shaped incision. Slight erythema along margins of both wounds.    LABS:                        10.3   7.80  )-----------( 290      ( 20 Mar 2022 08:34 )             32.9     03-20    137  |  101  |  9   ----------------------------<  141<H>  4.6   |  27  |  0.72    Ca    8.5      20 Mar 2022 08:34  Phos  2.1     03-20  Mg     2.0     03-20    TPro  6.1  /  Alb  3.3  /  TBili  0.7  /  DBili  x   /  AST  23  /  ALT  27  /  AlkPhos  66  03-20      LIVER FUNCTIONS - ( 20 Mar 2022 08:34 )  Alb: 3.3 g/dL / Pro: 6.1 g/dL / ALK PHOS: 66 U/L / ALT: 27 U/L / AST: 23 U/L / GGT: x           Urinalysis Basic - ( 19 Mar 2022 22:25 )    Color: Yellow / Appearance: Clear / S.025 / pH: x  Gluc: x / Ketone: Small  / Bili: Negative / Urobili: Negative   Blood: x / Protein: 100 / Nitrite: Negative   Leuk Esterase: Negative / RBC: 42 /hpf / WBC 14 /HPF   Sq Epi: x / Non Sq Epi: 3 /hpf / Bacteria: Negative

## 2022-03-21 NOTE — PROGRESS NOTE ADULT - SUBJECTIVE AND OBJECTIVE BOX
Day 6\5 of Anesthesia Pain Management Service    SUBJECTIVE: I'm doing a little better    Pain Scale Score:	[X] Refer to charted pain scores    THERAPY:    [ ] IV PCA Morphine		[ ] 5 mg/mL	[ ] 1 mg/mL  [X] IV PCA Hydromorphone	[ ] 5 mg/mL	[X] 1 mg/mL  [ ] IV PCA Fentanyl		[ ] 50 micrograms/mL    Demand dose: 0.25 mg     Lockout: 6 minutes   Continuous Rate: 0 mg/hr  4 Hour Limit: 6mg    MEDICATIONS  (STANDING):  acetaminophen   IVPB .. 1000 milliGRAM(s) IV Intermittent every 6 hours  bupivacaine  0.25%/epinephrine [id number] Injectable 10 milliLiter(s) Local Injection once  dextrose 40% Gel 15 Gram(s) Oral once  dextrose 5% + sodium chloride 0.45% with potassium chloride 20 mEq/L 1000 milliLiter(s) (125 mL/Hr) IV Continuous <Continuous>  dextrose 5%. 1000 milliLiter(s) (50 mL/Hr) IV Continuous <Continuous>  dextrose 5%. 1000 milliLiter(s) (100 mL/Hr) IV Continuous <Continuous>  dextrose 50% Injectable 25 Gram(s) IV Push once  dextrose 50% Injectable 12.5 Gram(s) IV Push once  dextrose 50% Injectable 25 Gram(s) IV Push once  glucagon  Injectable 1 milliGRAM(s) IntraMuscular once  heparin   Injectable 5000 Unit(s) SubCutaneous every 8 hours  insulin lispro (ADMELOG) corrective regimen sliding scale   SubCutaneous three times a day before meals  insulin lispro (ADMELOG) corrective regimen sliding scale   SubCutaneous at bedtime  pantoprazole  Injectable 40 milliGRAM(s) IV Push daily    MEDICATIONS  (PRN):  cyclobenzaprine 5 milliGRAM(s) Oral three times a day PRN Muscle Spasm  naloxone Injectable 0.1 milliGRAM(s) IV Push every 3 minutes PRN For ANY of the following changes in patient status:  A. RR LESS THAN 10 breaths per minute, B. Oxygen saturation LESS THAN 90%, C. Sedation score of 6  ondansetron Injectable 4 milliGRAM(s) IV Push every 6 hours PRN Nausea      OBJECTIVE:    Sedation Score:	[ X] Alert 	[ ] Drowsy 	[ ] Arousable	[ ] Asleep	[ ] Unresponsive    Side Effects:	[X ] None	[ ] Nausea	[ ] Vomiting	[ ] Pruritus  		[ ] Other:    Vital Signs Last 24 Hrs  T(C): 37.4 (21 Mar 2022 05:30), Max: 37.4 (21 Mar 2022 05:30)  T(F): 99.4 (21 Mar 2022 05:30), Max: 99.4 (21 Mar 2022 05:30)  HR: 95 (21 Mar 2022 05:30) (84 - 98)  BP: 155/90 (21 Mar 2022 05:30) (125/77 - 155/90)  BP(mean): --  RR: 18 (21 Mar 2022 05:30) (16 - 18)  SpO2: 97% (21 Mar 2022 05:30) (95% - 98%)    ASSESSMENT/ PLAN    Therapy to  be:               [X] Continued   [ ] Discontinued   [ ] Changed to PRN Analgesics    Documentation and Verification of current medications:   [X] Done	[ ] Not done, not eligible    Comments: Endorsing improved pain control. Total PCA use 16.25mg/24 hours. Using 2-7x/hr. Consider transition to prn analgesics.

## 2022-03-22 LAB
ALBUMIN SERPL ELPH-MCNC: 3.3 G/DL — SIGNIFICANT CHANGE UP (ref 3.3–5)
ALP SERPL-CCNC: 81 U/L — SIGNIFICANT CHANGE UP (ref 40–120)
ALT FLD-CCNC: 28 U/L — SIGNIFICANT CHANGE UP (ref 10–45)
ANION GAP SERPL CALC-SCNC: 12 MMOL/L — SIGNIFICANT CHANGE UP (ref 5–17)
AST SERPL-CCNC: 19 U/L — SIGNIFICANT CHANGE UP (ref 10–40)
BILIRUB SERPL-MCNC: 0.6 MG/DL — SIGNIFICANT CHANGE UP (ref 0.2–1.2)
BUN SERPL-MCNC: 5 MG/DL — LOW (ref 7–23)
CALCIUM SERPL-MCNC: 8.8 MG/DL — SIGNIFICANT CHANGE UP (ref 8.4–10.5)
CHLORIDE SERPL-SCNC: 99 MMOL/L — SIGNIFICANT CHANGE UP (ref 96–108)
CO2 SERPL-SCNC: 25 MMOL/L — SIGNIFICANT CHANGE UP (ref 22–31)
CREAT SERPL-MCNC: 0.72 MG/DL — SIGNIFICANT CHANGE UP (ref 0.5–1.3)
EGFR: 107 ML/MIN/1.73M2 — SIGNIFICANT CHANGE UP
GLUCOSE BLDC GLUCOMTR-MCNC: 100 MG/DL — HIGH (ref 70–99)
GLUCOSE BLDC GLUCOMTR-MCNC: 142 MG/DL — HIGH (ref 70–99)
GLUCOSE BLDC GLUCOMTR-MCNC: 179 MG/DL — HIGH (ref 70–99)
GLUCOSE BLDC GLUCOMTR-MCNC: 93 MG/DL — SIGNIFICANT CHANGE UP (ref 70–99)
GLUCOSE SERPL-MCNC: 135 MG/DL — HIGH (ref 70–99)
HCT VFR BLD CALC: 31.7 % — LOW (ref 39–50)
HGB BLD-MCNC: 10 G/DL — LOW (ref 13–17)
MAGNESIUM SERPL-MCNC: 2.1 MG/DL — SIGNIFICANT CHANGE UP (ref 1.6–2.6)
MCHC RBC-ENTMCNC: 29.7 PG — SIGNIFICANT CHANGE UP (ref 27–34)
MCHC RBC-ENTMCNC: 31.5 GM/DL — LOW (ref 32–36)
MCV RBC AUTO: 94.1 FL — SIGNIFICANT CHANGE UP (ref 80–100)
NRBC # BLD: 0 /100 WBCS — SIGNIFICANT CHANGE UP (ref 0–0)
PHOSPHATE SERPL-MCNC: 3.1 MG/DL — SIGNIFICANT CHANGE UP (ref 2.5–4.5)
PLATELET # BLD AUTO: 382 K/UL — SIGNIFICANT CHANGE UP (ref 150–400)
POTASSIUM SERPL-MCNC: 4.3 MMOL/L — SIGNIFICANT CHANGE UP (ref 3.5–5.3)
POTASSIUM SERPL-SCNC: 4.3 MMOL/L — SIGNIFICANT CHANGE UP (ref 3.5–5.3)
PROT SERPL-MCNC: 6.4 G/DL — SIGNIFICANT CHANGE UP (ref 6–8.3)
RBC # BLD: 3.37 M/UL — LOW (ref 4.2–5.8)
RBC # FLD: 14.1 % — SIGNIFICANT CHANGE UP (ref 10.3–14.5)
SODIUM SERPL-SCNC: 136 MMOL/L — SIGNIFICANT CHANGE UP (ref 135–145)
WBC # BLD: 9.22 K/UL — SIGNIFICANT CHANGE UP (ref 3.8–10.5)
WBC # FLD AUTO: 9.22 K/UL — SIGNIFICANT CHANGE UP (ref 3.8–10.5)

## 2022-03-22 RX ORDER — IBUPROFEN 200 MG
400 TABLET ORAL EVERY 6 HOURS
Refills: 0 | Status: DISCONTINUED | OUTPATIENT
Start: 2022-03-22 | End: 2022-03-22

## 2022-03-22 RX ORDER — IBUPROFEN 200 MG
400 TABLET ORAL EVERY 6 HOURS
Refills: 0 | Status: DISCONTINUED | OUTPATIENT
Start: 2022-03-22 | End: 2022-03-23

## 2022-03-22 RX ADMIN — OXYCODONE HYDROCHLORIDE 5 MILLIGRAM(S): 5 TABLET ORAL at 16:00

## 2022-03-22 RX ADMIN — Medication 400 MILLIGRAM(S): at 23:59

## 2022-03-22 RX ADMIN — Medication 400 MILLIGRAM(S): at 12:28

## 2022-03-22 RX ADMIN — Medication 400 MILLIGRAM(S): at 05:40

## 2022-03-22 RX ADMIN — OXYCODONE HYDROCHLORIDE 5 MILLIGRAM(S): 5 TABLET ORAL at 15:04

## 2022-03-22 RX ADMIN — HEPARIN SODIUM 5000 UNIT(S): 5000 INJECTION INTRAVENOUS; SUBCUTANEOUS at 22:11

## 2022-03-22 RX ADMIN — Medication 400 MILLIGRAM(S): at 18:00

## 2022-03-22 RX ADMIN — OXYCODONE HYDROCHLORIDE 5 MILLIGRAM(S): 5 TABLET ORAL at 10:50

## 2022-03-22 RX ADMIN — OXYCODONE HYDROCHLORIDE 5 MILLIGRAM(S): 5 TABLET ORAL at 06:19

## 2022-03-22 RX ADMIN — Medication 400 MILLIGRAM(S): at 13:00

## 2022-03-22 RX ADMIN — OXYCODONE HYDROCHLORIDE 5 MILLIGRAM(S): 5 TABLET ORAL at 23:29

## 2022-03-22 RX ADMIN — OXYCODONE HYDROCHLORIDE 5 MILLIGRAM(S): 5 TABLET ORAL at 05:49

## 2022-03-22 RX ADMIN — OXYCODONE HYDROCHLORIDE 5 MILLIGRAM(S): 5 TABLET ORAL at 19:27

## 2022-03-22 RX ADMIN — OXYCODONE HYDROCHLORIDE 5 MILLIGRAM(S): 5 TABLET ORAL at 23:59

## 2022-03-22 RX ADMIN — OXYCODONE HYDROCHLORIDE 5 MILLIGRAM(S): 5 TABLET ORAL at 09:53

## 2022-03-22 RX ADMIN — HEPARIN SODIUM 5000 UNIT(S): 5000 INJECTION INTRAVENOUS; SUBCUTANEOUS at 05:41

## 2022-03-22 RX ADMIN — Medication 400 MILLIGRAM(S): at 23:13

## 2022-03-22 RX ADMIN — Medication 400 MILLIGRAM(S): at 17:39

## 2022-03-22 RX ADMIN — OXYCODONE HYDROCHLORIDE 5 MILLIGRAM(S): 5 TABLET ORAL at 19:57

## 2022-03-22 RX ADMIN — Medication 1000 MILLIGRAM(S): at 05:55

## 2022-03-22 RX ADMIN — HEPARIN SODIUM 5000 UNIT(S): 5000 INJECTION INTRAVENOUS; SUBCUTANEOUS at 15:04

## 2022-03-22 RX ADMIN — PANTOPRAZOLE SODIUM 40 MILLIGRAM(S): 20 TABLET, DELAYED RELEASE ORAL at 12:28

## 2022-03-22 NOTE — PROGRESS NOTE ADULT - SUBJECTIVE AND OBJECTIVE BOX
Green Team Surgery Progress Note      SUBJECTIVE: Patient seen and examined at bedside with surgical team.     OBJECTIVE:    Vital Signs Last 24 Hrs  T(C): 37.2 (21 Mar 2022 22:09), Max: 37.6 (21 Mar 2022 17:27)  T(F): 98.9 (21 Mar 2022 22:09), Max: 99.6 (21 Mar 2022 17:27)  HR: 92 (21 Mar 2022 22:09) (84 - 104)  BP: 135/71 (21 Mar 2022 22:09) (123/70 - 158/83)  BP(mean): --  RR: 18 (21 Mar 2022 22:09) (18 - 18)  SpO2: 98% (21 Mar 2022 22:09) (95% - 98%)I&O's Detail    20 Mar 2022 07:01  -  21 Mar 2022 07:00  --------------------------------------------------------  IN:    dextrose 5% + sodium chloride 0.45% w/ Additives: 3000 mL    IV PiggyBack: 500 mL  Total IN: 3500 mL    OUT:    Oral Fluid: 0 mL    Voided (mL): 1700 mL  Total OUT: 1700 mL    Total NET: 1800 mL      21 Mar 2022 07:01  -  22 Mar 2022 00:38  --------------------------------------------------------  IN:    dextrose 5% + sodium chloride 0.45% w/ Additives: 1250 mL    IV PiggyBack: 250 mL    IV PiggyBack: 50 mL    IV PiggyBack: 200 mL    Oral Fluid: 2220 mL  Total IN: 3970 mL    OUT:  Total OUT: 0 mL    Total NET: 3970 mL      MEDICATIONS  (STANDING):  acetaminophen   IVPB .. 1000 milliGRAM(s) IV Intermittent every 6 hours  dextrose 40% Gel 15 Gram(s) Oral once  dextrose 5% + sodium chloride 0.45% with potassium chloride 20 mEq/L 1000 milliLiter(s) (75 mL/Hr) IV Continuous <Continuous>  dextrose 5%. 1000 milliLiter(s) (50 mL/Hr) IV Continuous <Continuous>  dextrose 5%. 1000 milliLiter(s) (100 mL/Hr) IV Continuous <Continuous>  dextrose 50% Injectable 25 Gram(s) IV Push once  dextrose 50% Injectable 12.5 Gram(s) IV Push once  dextrose 50% Injectable 25 Gram(s) IV Push once  glucagon  Injectable 1 milliGRAM(s) IntraMuscular once  heparin   Injectable 5000 Unit(s) SubCutaneous every 8 hours  HYDROmorphone  IVPB 0.5 milliGRAM(s) IV Intermittent once  insulin lispro (ADMELOG) corrective regimen sliding scale   SubCutaneous three times a day before meals  insulin lispro (ADMELOG) corrective regimen sliding scale   SubCutaneous at bedtime  pantoprazole  Injectable 40 milliGRAM(s) IV Push daily    MEDICATIONS  (PRN):  cyclobenzaprine 5 milliGRAM(s) Oral three times a day PRN Muscle Spasm  naloxone Injectable 0.1 milliGRAM(s) IV Push every 3 minutes PRN For ANY of the following changes in patient status:  A. RR LESS THAN 10 breaths per minute, B. Oxygen saturation LESS THAN 90%, C. Sedation score of 6  ondansetron Injectable 4 milliGRAM(s) IV Push every 6 hours PRN Nausea  oxyCODONE    IR 5 milliGRAM(s) Oral every 4 hours PRN Severe Pain (7 - 10)  oxyCODONE    IR 2.5 milliGRAM(s) Oral every 4 hours PRN Moderate Pain (4 - 6)      PHYSICAL EXAM:  GENERAL: NAD, lying in bed comfortably. Pleasant and responsive  CHEST/LUNG: Unlabored respirations. Comfortable on room air  ABDOMEN: Severely distended without peritoneal signs. Vac along L shaped incision. Slight erythema along margins of both wounds.    LABS:                        10.3   9.65  )-----------( 298      ( 21 Mar 2022 06:03 )             32.6     03-21    135  |  100  |  6<L>  ----------------------------<  136<H>  4.7   |  24  |  0.69    Ca    8.8      21 Mar 2022 06:03  Phos  2.9     03-21  Mg     1.9     03-21    TPro  6.4  /  Alb  3.2<L>  /  TBili  0.7  /  DBili  x   /  AST  28  /  ALT  32  /  AlkPhos  73  03-21      LIVER FUNCTIONS - ( 21 Mar 2022 06:03 )  Alb: 3.2 g/dL / Pro: 6.4 g/dL / ALK PHOS: 73 U/L / ALT: 32 U/L / AST: 28 U/L / GGT: x                  Green Team Surgery Progress Note    SUBJECTIVE: Patient seen and examined at bedside with surgical team. Patient reports tolerating clear liquid diet. Ambulating well. Patient reports passing gas.     OBJECTIVE:  Vital Signs Last 24 Hrs  T(C): 37.2 (21 Mar 2022 22:09), Max: 37.6 (21 Mar 2022 17:27)  T(F): 98.9 (21 Mar 2022 22:09), Max: 99.6 (21 Mar 2022 17:27)  HR: 92 (21 Mar 2022 22:09) (84 - 104)  BP: 135/71 (21 Mar 2022 22:09) (123/70 - 158/83)  BP(mean): --  RR: 18 (21 Mar 2022 22:09) (18 - 18)  SpO2: 98% (21 Mar 2022 22:09) (95% - 98%)I&O's Detail    20 Mar 2022 07:01  -  21 Mar 2022 07:00  --------------------------------------------------------  IN:    dextrose 5% + sodium chloride 0.45% w/ Additives: 3000 mL    IV PiggyBack: 500 mL  Total IN: 3500 mL    OUT:    Oral Fluid: 0 mL    Voided (mL): 1700 mL  Total OUT: 1700 mL  Total NET: 1800 mL      21 Mar 2022 07:01  -  22 Mar 2022 00:38  --------------------------------------------------------  IN:    dextrose 5% + sodium chloride 0.45% w/ Additives: 1250 mL    IV PiggyBack: 250 mL    IV PiggyBack: 50 mL    IV PiggyBack: 200 mL    Oral Fluid: 2220 mL  Total IN: 3970 mL    OUT:  Total OUT: 0 mL  Total NET: 3970 mL    MEDICATIONS  (STANDING):  acetaminophen   IVPB .. 1000 milliGRAM(s) IV Intermittent every 6 hours  dextrose 40% Gel 15 Gram(s) Oral once  dextrose 5% + sodium chloride 0.45% with potassium chloride 20 mEq/L 1000 milliLiter(s) (75 mL/Hr) IV Continuous <Continuous>  dextrose 5%. 1000 milliLiter(s) (50 mL/Hr) IV Continuous <Continuous>  dextrose 5%. 1000 milliLiter(s) (100 mL/Hr) IV Continuous <Continuous>  dextrose 50% Injectable 25 Gram(s) IV Push once  dextrose 50% Injectable 12.5 Gram(s) IV Push once  dextrose 50% Injectable 25 Gram(s) IV Push once  glucagon  Injectable 1 milliGRAM(s) IntraMuscular once  heparin   Injectable 5000 Unit(s) SubCutaneous every 8 hours  HYDROmorphone  IVPB 0.5 milliGRAM(s) IV Intermittent once  insulin lispro (ADMELOG) corrective regimen sliding scale   SubCutaneous three times a day before meals  insulin lispro (ADMELOG) corrective regimen sliding scale   SubCutaneous at bedtime  pantoprazole  Injectable 40 milliGRAM(s) IV Push daily    MEDICATIONS  (PRN):  cyclobenzaprine 5 milliGRAM(s) Oral three times a day PRN Muscle Spasm  naloxone Injectable 0.1 milliGRAM(s) IV Push every 3 minutes PRN For ANY of the following changes in patient status:  A. RR LESS THAN 10 breaths per minute, B. Oxygen saturation LESS THAN 90%, C. Sedation score of 6  ondansetron Injectable 4 milliGRAM(s) IV Push every 6 hours PRN Nausea  oxyCODONE    IR 5 milliGRAM(s) Oral every 4 hours PRN Severe Pain (7 - 10)  oxyCODONE    IR 2.5 milliGRAM(s) Oral every 4 hours PRN Moderate Pain (4 - 6)      PHYSICAL EXAM:  GENERAL: NAD, lying in bed comfortably. Pleasant and responsive  CHEST/LUNG: Unlabored respirations. Comfortable on room air  ABDOMEN: Mildly resolved, Vac along L shaped incision. Slight erythema along margins of both wounds.    LABS:                        10.3   9.65  )-----------( 298      ( 21 Mar 2022 06:03 )             32.6     03-21    135  |  100  |  6<L>  ----------------------------<  136<H>  4.7   |  24  |  0.69    Ca    8.8      21 Mar 2022 06:03  Phos  2.9     03-21  Mg     1.9     03-21    TPro  6.4  /  Alb  3.2<L>  /  TBili  0.7  /  DBili  x   /  AST  28  /  ALT  32  /  AlkPhos  73  03-21      LIVER FUNCTIONS - ( 21 Mar 2022 06:03 )  Alb: 3.2 g/dL / Pro: 6.4 g/dL / ALK PHOS: 73 U/L / ALT: 32 U/L / AST: 28 U/L / GGT: x

## 2022-03-22 NOTE — PROGRESS NOTE ADULT - SUBJECTIVE AND OBJECTIVE BOX
Surgery Progress Note      SUBJECTIVE: Patient seen and examined at bedside with surgical team. No acute events overnight.     OBJECTIVE:    Vital Signs Last 24 Hrs  T(C): 36.9 (22 Mar 2022 01:35), Max: 37.6 (21 Mar 2022 17:27)  T(F): 98.4 (22 Mar 2022 01:35), Max: 99.6 (21 Mar 2022 17:27)  HR: 81 (22 Mar 2022 01:35) (81 - 104)  BP: 121/71 (22 Mar 2022 01:35) (121/71 - 158/83)  BP(mean): --  RR: 18 (22 Mar 2022 01:35) (18 - 18)  SpO2: 95% (22 Mar 2022 01:35) (95% - 98%)I&O's Detail    20 Mar 2022 07:01  -  21 Mar 2022 07:00  --------------------------------------------------------  IN:    dextrose 5% + sodium chloride 0.45% w/ Additives: 3000 mL    IV PiggyBack: 500 mL  Total IN: 3500 mL    OUT:    Oral Fluid: 0 mL    Voided (mL): 1700 mL  Total OUT: 1700 mL    Total NET: 1800 mL      21 Mar 2022 07:01  -  22 Mar 2022 01:46  --------------------------------------------------------  IN:    dextrose 5% + sodium chloride 0.45% w/ Additives: 1250 mL    IV PiggyBack: 250 mL    IV PiggyBack: 50 mL    IV PiggyBack: 200 mL    Oral Fluid: 2220 mL  Total IN: 3970 mL    OUT:    Voided (mL): 1000 mL  Total OUT: 1000 mL    Total NET: 2970 mL      MEDICATIONS  (STANDING):  acetaminophen   IVPB .. 1000 milliGRAM(s) IV Intermittent every 6 hours  dextrose 40% Gel 15 Gram(s) Oral once  dextrose 5% + sodium chloride 0.45% with potassium chloride 20 mEq/L 1000 milliLiter(s) (75 mL/Hr) IV Continuous <Continuous>  dextrose 5%. 1000 milliLiter(s) (50 mL/Hr) IV Continuous <Continuous>  dextrose 5%. 1000 milliLiter(s) (100 mL/Hr) IV Continuous <Continuous>  dextrose 50% Injectable 25 Gram(s) IV Push once  dextrose 50% Injectable 12.5 Gram(s) IV Push once  dextrose 50% Injectable 25 Gram(s) IV Push once  glucagon  Injectable 1 milliGRAM(s) IntraMuscular once  heparin   Injectable 5000 Unit(s) SubCutaneous every 8 hours  HYDROmorphone  IVPB 0.5 milliGRAM(s) IV Intermittent once  insulin lispro (ADMELOG) corrective regimen sliding scale   SubCutaneous three times a day before meals  insulin lispro (ADMELOG) corrective regimen sliding scale   SubCutaneous at bedtime  pantoprazole  Injectable 40 milliGRAM(s) IV Push daily    MEDICATIONS  (PRN):  cyclobenzaprine 5 milliGRAM(s) Oral three times a day PRN Muscle Spasm  naloxone Injectable 0.1 milliGRAM(s) IV Push every 3 minutes PRN For ANY of the following changes in patient status:  A. RR LESS THAN 10 breaths per minute, B. Oxygen saturation LESS THAN 90%, C. Sedation score of 6  ondansetron Injectable 4 milliGRAM(s) IV Push every 6 hours PRN Nausea  oxyCODONE    IR 5 milliGRAM(s) Oral every 4 hours PRN Severe Pain (7 - 10)  oxyCODONE    IR 2.5 milliGRAM(s) Oral every 4 hours PRN Moderate Pain (4 - 6)      PHYSICAL EXAM:  GENERAL: NAD, lying in bed comfortably. Pleasant and responsive  CHEST/LUNG: Unlabored respirations. Comfortable on room air  ABDOMEN: Severely distended without peritoneal signs. Vac along L shaped incision. Slight erythema along margins of both wounds.    LABS:                        10.3   9.65  )-----------( 298      ( 21 Mar 2022 06:03 )             32.6     03-21    135  |  100  |  6<L>  ----------------------------<  136<H>  4.7   |  24  |  0.69    Ca    8.8      21 Mar 2022 06:03  Phos  2.9     03-21  Mg     1.9     03-21    TPro  6.4  /  Alb  3.2<L>  /  TBili  0.7  /  DBili  x   /  AST  28  /  ALT  32  /  AlkPhos  73  03-21      LIVER FUNCTIONS - ( 21 Mar 2022 06:03 )  Alb: 3.2 g/dL / Pro: 6.4 g/dL / ALK PHOS: 73 U/L / ALT: 32 U/L / AST: 28 U/L / GGT: x

## 2022-03-22 NOTE — PROGRESS NOTE ADULT - ASSESSMENT
56y with PMH of HTN , Covid in August of 2021, Status post Dev procedure for perforated diverticulitis in 9/21@ [facility] / discharged home with an extended course of antibiotic and required wound VAC placement which was removed . Pt noted with abdominal bulge, c/o abdominal pain/discomfort noted with abdominal wall hernia. Patient now s/p Dev reversal and repair of midline abdominal wall defect (3/15). Patient requiring pressor support secondary to administration of epidural bupivacaine. Pain now controlled, patient recovering at the floor,     PLAN:  - monitor vac output, set up VNS for vac   - Pain control   - Incentive spirometry  - Monitor vitals  - Monitor bowel function  - Diet: CLD; Plan for LRD on Wednesday  - Monitor I+Os  - OOB/ Ambulate   - DVT ppx        Red team  [id number]

## 2022-03-22 NOTE — PROGRESS NOTE ADULT - ASSESSMENT
56y with PMH of HTN , Covid in August of 2021, Status post Dev procedure for perforated diverticulitis in 9/21@ [facility] / discharged home with an extended course of antibiotic and required wound VAC placement which was removed . Pt noted with abdominal bulge, c/o abdominal pain/discomfort noted with abdominal wall hernia. Patient now s/p Dev reversal and repair of midline abdominal wall defect (3/15).     PLAN:  - Continue to monitor wound vac output   - Pain control as per Pain and primary team   - Encourage IS  - Nausea control PRN  - Monitor vitals  - Diet: IVF + NPO w sips  - Monitor I+Os  - OOB/ Ambulate/ PT  - DVT ppx  - Primary care per red team surgery    Green Team Surgery, [phone]   56y with PMH of HTN , Covid in August of 2021, Status post Dev procedure for perforated diverticulitis in 9/21@ [facility] / discharged home with an extended course of antibiotic and required wound VAC placement which was removed . Pt noted with abdominal bulge, c/o abdominal pain/discomfort noted with abdominal wall hernia. Patient now s/p Dev reversal and repair of midline abdominal wall defect (3/15).     PLAN:  - Continue to monitor wound vac output   - Pain control as per Pain and primary team   - Encourage IS  - Nausea control PRN  - Monitor vitals  - Diet: IVF + CLD  - Monitor I+Os  - OOB/ Ambulate/ PT  - DVT ppx  - Primary care per red team surgery    Green Team Surgery, [phone]   56y with PMH of HTN , Covid in August of 2021, Status post Dev procedure for perforated diverticulitis in 9/21@ [facility] / discharged home with an extended course of antibiotic and required wound VAC placement which was removed . Pt noted with abdominal bulge, c/o abdominal pain/discomfort noted with abdominal wall hernia. Patient now s/p Dev reversal and repair of midline abdominal wall defect (3/15).     PLAN:  - Continue to monitor wound vac output   - Pain control as per Pain and primary team   - Encourage IS  - Nausea control PRN  - Monitor vitals  - Diet: IVF + CLD- will advance to LRD  - Monitor I+Os  - OOB/ Ambulate/ PT  - DVT ppx  - Primary care per red team surgery    Green Team Surgery, [phone]

## 2022-03-23 ENCOUNTER — TRANSCRIPTION ENCOUNTER (OUTPATIENT)
Age: 57
End: 2022-03-23

## 2022-03-23 VITALS
HEART RATE: 77 BPM | RESPIRATION RATE: 18 BRPM | OXYGEN SATURATION: 99 % | DIASTOLIC BLOOD PRESSURE: 91 MMHG | SYSTOLIC BLOOD PRESSURE: 157 MMHG | TEMPERATURE: 98 F

## 2022-03-23 LAB
ANION GAP SERPL CALC-SCNC: 9 MMOL/L — SIGNIFICANT CHANGE UP (ref 5–17)
BUN SERPL-MCNC: 8 MG/DL — SIGNIFICANT CHANGE UP (ref 7–23)
CALCIUM SERPL-MCNC: 8.8 MG/DL — SIGNIFICANT CHANGE UP (ref 8.4–10.5)
CHLORIDE SERPL-SCNC: 102 MMOL/L — SIGNIFICANT CHANGE UP (ref 96–108)
CO2 SERPL-SCNC: 26 MMOL/L — SIGNIFICANT CHANGE UP (ref 22–31)
CREAT SERPL-MCNC: 0.75 MG/DL — SIGNIFICANT CHANGE UP (ref 0.5–1.3)
EGFR: 105 ML/MIN/1.73M2 — SIGNIFICANT CHANGE UP
GLUCOSE BLDC GLUCOMTR-MCNC: 134 MG/DL — HIGH (ref 70–99)
GLUCOSE SERPL-MCNC: 136 MG/DL — HIGH (ref 70–99)
HCT VFR BLD CALC: 31.3 % — LOW (ref 39–50)
HGB BLD-MCNC: 9.9 G/DL — LOW (ref 13–17)
MAGNESIUM SERPL-MCNC: 2 MG/DL — SIGNIFICANT CHANGE UP (ref 1.6–2.6)
MCHC RBC-ENTMCNC: 30 PG — SIGNIFICANT CHANGE UP (ref 27–34)
MCHC RBC-ENTMCNC: 31.6 GM/DL — LOW (ref 32–36)
MCV RBC AUTO: 94.8 FL — SIGNIFICANT CHANGE UP (ref 80–100)
NRBC # BLD: 0 /100 WBCS — SIGNIFICANT CHANGE UP (ref 0–0)
PHOSPHATE SERPL-MCNC: 3.1 MG/DL — SIGNIFICANT CHANGE UP (ref 2.5–4.5)
PLATELET # BLD AUTO: 464 K/UL — HIGH (ref 150–400)
POTASSIUM SERPL-MCNC: 4.1 MMOL/L — SIGNIFICANT CHANGE UP (ref 3.5–5.3)
POTASSIUM SERPL-SCNC: 4.1 MMOL/L — SIGNIFICANT CHANGE UP (ref 3.5–5.3)
RBC # BLD: 3.3 M/UL — LOW (ref 4.2–5.8)
RBC # FLD: 14.1 % — SIGNIFICANT CHANGE UP (ref 10.3–14.5)
SODIUM SERPL-SCNC: 137 MMOL/L — SIGNIFICANT CHANGE UP (ref 135–145)
WBC # BLD: 9.24 K/UL — SIGNIFICANT CHANGE UP (ref 3.8–10.5)
WBC # FLD AUTO: 9.24 K/UL — SIGNIFICANT CHANGE UP (ref 3.8–10.5)

## 2022-03-23 PROCEDURE — 81001 URINALYSIS AUTO W/SCOPE: CPT

## 2022-03-23 PROCEDURE — 85027 COMPLETE CBC AUTOMATED: CPT

## 2022-03-23 PROCEDURE — 84100 ASSAY OF PHOSPHORUS: CPT

## 2022-03-23 PROCEDURE — 85025 COMPLETE CBC W/AUTO DIFF WBC: CPT

## 2022-03-23 PROCEDURE — 88307 TISSUE EXAM BY PATHOLOGIST: CPT

## 2022-03-23 PROCEDURE — 71045 X-RAY EXAM CHEST 1 VIEW: CPT

## 2022-03-23 PROCEDURE — 87040 BLOOD CULTURE FOR BACTERIA: CPT

## 2022-03-23 PROCEDURE — 80048 BASIC METABOLIC PNL TOTAL CA: CPT

## 2022-03-23 PROCEDURE — 83605 ASSAY OF LACTIC ACID: CPT

## 2022-03-23 PROCEDURE — C1889: CPT

## 2022-03-23 PROCEDURE — 74018 RADEX ABDOMEN 1 VIEW: CPT

## 2022-03-23 PROCEDURE — C1769: CPT

## 2022-03-23 PROCEDURE — 80053 COMPREHEN METABOLIC PANEL: CPT

## 2022-03-23 PROCEDURE — 93005 ELECTROCARDIOGRAM TRACING: CPT

## 2022-03-23 PROCEDURE — 85730 THROMBOPLASTIN TIME PARTIAL: CPT

## 2022-03-23 PROCEDURE — 97164 PT RE-EVAL EST PLAN CARE: CPT

## 2022-03-23 PROCEDURE — 83735 ASSAY OF MAGNESIUM: CPT

## 2022-03-23 PROCEDURE — 88304 TISSUE EXAM BY PATHOLOGIST: CPT

## 2022-03-23 PROCEDURE — C1758: CPT

## 2022-03-23 PROCEDURE — 97116 GAIT TRAINING THERAPY: CPT

## 2022-03-23 PROCEDURE — 85610 PROTHROMBIN TIME: CPT

## 2022-03-23 PROCEDURE — C1781: CPT

## 2022-03-23 PROCEDURE — C9399: CPT

## 2022-03-23 PROCEDURE — 82962 GLUCOSE BLOOD TEST: CPT

## 2022-03-23 PROCEDURE — 97162 PT EVAL MOD COMPLEX 30 MIN: CPT

## 2022-03-23 PROCEDURE — 97606 NEG PRS WND THER DME>50 SQCM: CPT

## 2022-03-23 PROCEDURE — 97110 THERAPEUTIC EXERCISES: CPT

## 2022-03-23 PROCEDURE — 36415 COLL VENOUS BLD VENIPUNCTURE: CPT

## 2022-03-23 RX ORDER — IBUPROFEN 200 MG
1 TABLET ORAL
Qty: 0 | Refills: 0 | DISCHARGE
Start: 2022-03-23

## 2022-03-23 RX ORDER — PHENTERMINE HCL 30 MG
1 CAPSULE ORAL
Qty: 0 | Refills: 0 | DISCHARGE

## 2022-03-23 RX ORDER — OXYCODONE HYDROCHLORIDE 5 MG/1
1 TABLET ORAL
Qty: 8 | Refills: 0
Start: 2022-03-23

## 2022-03-23 RX ADMIN — OXYCODONE HYDROCHLORIDE 5 MILLIGRAM(S): 5 TABLET ORAL at 06:55

## 2022-03-23 RX ADMIN — OXYCODONE HYDROCHLORIDE 5 MILLIGRAM(S): 5 TABLET ORAL at 07:47

## 2022-03-23 RX ADMIN — OXYCODONE HYDROCHLORIDE 5 MILLIGRAM(S): 5 TABLET ORAL at 09:37

## 2022-03-23 RX ADMIN — Medication 400 MILLIGRAM(S): at 06:10

## 2022-03-23 RX ADMIN — OXYCODONE HYDROCHLORIDE 5 MILLIGRAM(S): 5 TABLET ORAL at 10:16

## 2022-03-23 RX ADMIN — Medication 400 MILLIGRAM(S): at 06:40

## 2022-03-23 RX ADMIN — HEPARIN SODIUM 5000 UNIT(S): 5000 INJECTION INTRAVENOUS; SUBCUTANEOUS at 06:10

## 2022-03-23 NOTE — DISCHARGE NOTE NURSING/CASE MANAGEMENT/SOCIAL WORK - PATIENT PORTAL LINK FT
You can access the FollowMyHealth Patient Portal offered by [facility] by registering at the following website: [email or web address]. By joining [facility]’s FollowMyHealth portal, you will also be able to view your health information using other applications (apps) compatible with our system.

## 2022-03-23 NOTE — PROGRESS NOTE ADULT - SUBJECTIVE AND OBJECTIVE BOX
RED Surgery Progress Note      SUBJECTIVE: Patient seen and examined at bedside with surgical team. No acute events overnight.     OBJECTIVE:    Vital Signs Last 24 Hrs  T(C): 36.9 (23 Mar 2022 01:00), Max: 37.1 (22 Mar 2022 08:52)  T(F): 98.5 (23 Mar 2022 01:00), Max: 98.7 (22 Mar 2022 08:52)  HR: 81 (23 Mar 2022 01:00) (81 - 92)  BP: 130/75 (23 Mar 2022 01:00) (104/64 - 143/78)  BP(mean): --  RR: 18 (23 Mar 2022 01:00) (18 - 18)  SpO2: 98% (23 Mar 2022 01:00) (96% - 100%)I&O's Detail    21 Mar 2022 07:01  -  22 Mar 2022 07:00  --------------------------------------------------------  IN:    dextrose 5% + sodium chloride 0.45% w/ Additives: 2150 mL    IV PiggyBack: 400 mL    IV PiggyBack: 250 mL    IV PiggyBack: 50 mL    Oral Fluid: 2520 mL  Total IN: 5370 mL    OUT:    Voided (mL): 1600 mL  Total OUT: 1600 mL    Total NET: 3770 mL      22 Mar 2022 07:01  -  23 Mar 2022 02:53  --------------------------------------------------------  IN:    Oral Fluid: 900 mL  Total IN: 900 mL    OUT:    VAC (Vacuum Assisted Closure) System (mL): 50 mL    Voided (mL): 400 mL  Total OUT: 450 mL    Total NET: 450 mL      MEDICATIONS  (STANDING):  dextrose 40% Gel 15 Gram(s) Oral once  dextrose 5%. 1000 milliLiter(s) (50 mL/Hr) IV Continuous <Continuous>  dextrose 5%. 1000 milliLiter(s) (100 mL/Hr) IV Continuous <Continuous>  dextrose 50% Injectable 25 Gram(s) IV Push once  dextrose 50% Injectable 12.5 Gram(s) IV Push once  dextrose 50% Injectable 25 Gram(s) IV Push once  glucagon  Injectable 1 milliGRAM(s) IntraMuscular once  heparin   Injectable 5000 Unit(s) SubCutaneous every 8 hours  ibuprofen  Tablet. 400 milliGRAM(s) Oral every 6 hours  insulin lispro (ADMELOG) corrective regimen sliding scale   SubCutaneous three times a day before meals  insulin lispro (ADMELOG) corrective regimen sliding scale   SubCutaneous at bedtime  pantoprazole  Injectable 40 milliGRAM(s) IV Push daily    MEDICATIONS  (PRN):  cyclobenzaprine 5 milliGRAM(s) Oral three times a day PRN Muscle Spasm  naloxone Injectable 0.1 milliGRAM(s) IV Push every 3 minutes PRN For ANY of the following changes in patient status:  A. RR LESS THAN 10 breaths per minute, B. Oxygen saturation LESS THAN 90%, C. Sedation score of 6  ondansetron Injectable 4 milliGRAM(s) IV Push every 6 hours PRN Nausea  oxyCODONE    IR 5 milliGRAM(s) Oral every 4 hours PRN Severe Pain (7 - 10)  oxyCODONE    IR 2.5 milliGRAM(s) Oral every 4 hours PRN Moderate Pain (4 - 6)      PHYSICAL EXAM:  Constitutional: A&Ox3, NAD  Respiratory: Unlabored breathing  Abdomen:    Extremities: WWP, GE spontaneously    LABS:                        10.0   9.22  )-----------( 382      ( 22 Mar 2022 06:33 )             31.7     03-22    136  |  99  |  5<L>  ----------------------------<  135<H>  4.3   |  25  |  0.72    Ca    8.8      22 Mar 2022 06:31  Phos  3.1     03-22  Mg     2.1     03-22    TPro  6.4  /  Alb  3.3  /  TBili  0.6  /  DBili  x   /  AST  19  /  ALT  28  /  AlkPhos  81  03-22      LIVER FUNCTIONS - ( 22 Mar 2022 06:31 )  Alb: 3.3 g/dL / Pro: 6.4 g/dL / ALK PHOS: 81 U/L / ALT: 28 U/L / AST: 19 U/L / GGT: x                  RED Surgery Progress Note      SUBJECTIVE: Patient seen and examined at bedside with surgical team. No acute events overnight. Seen this morning, says he is tolerating his diet, no n/v. Continues to pass gas. Pain controlled.     OBJECTIVE:    Vital Signs Last 24 Hrs  T(C): 36.9 (23 Mar 2022 01:00), Max: 37.1 (22 Mar 2022 08:52)  T(F): 98.5 (23 Mar 2022 01:00), Max: 98.7 (22 Mar 2022 08:52)  HR: 81 (23 Mar 2022 01:00) (81 - 92)  BP: 130/75 (23 Mar 2022 01:00) (104/64 - 143/78)  BP(mean): --  RR: 18 (23 Mar 2022 01:00) (18 - 18)  SpO2: 98% (23 Mar 2022 01:00) (96% - 100%)I&O's Detail    21 Mar 2022 07:01  -  22 Mar 2022 07:00  --------------------------------------------------------  IN:    dextrose 5% + sodium chloride 0.45% w/ Additives: 2150 mL    IV PiggyBack: 400 mL    IV PiggyBack: 250 mL    IV PiggyBack: 50 mL    Oral Fluid: 2520 mL  Total IN: 5370 mL    OUT:    Voided (mL): 1600 mL  Total OUT: 1600 mL    Total NET: 3770 mL      22 Mar 2022 07:01  -  23 Mar 2022 02:53  --------------------------------------------------------  IN:    Oral Fluid: 900 mL  Total IN: 900 mL    OUT:    VAC (Vacuum Assisted Closure) System (mL): 50 mL    Voided (mL): 400 mL  Total OUT: 450 mL    Total NET: 450 mL      MEDICATIONS  (STANDING):  dextrose 40% Gel 15 Gram(s) Oral once  dextrose 5%. 1000 milliLiter(s) (50 mL/Hr) IV Continuous <Continuous>  dextrose 5%. 1000 milliLiter(s) (100 mL/Hr) IV Continuous <Continuous>  dextrose 50% Injectable 25 Gram(s) IV Push once  dextrose 50% Injectable 12.5 Gram(s) IV Push once  dextrose 50% Injectable 25 Gram(s) IV Push once  glucagon  Injectable 1 milliGRAM(s) IntraMuscular once  heparin   Injectable 5000 Unit(s) SubCutaneous every 8 hours  ibuprofen  Tablet. 400 milliGRAM(s) Oral every 6 hours  insulin lispro (ADMELOG) corrective regimen sliding scale   SubCutaneous three times a day before meals  insulin lispro (ADMELOG) corrective regimen sliding scale   SubCutaneous at bedtime  pantoprazole  Injectable 40 milliGRAM(s) IV Push daily    MEDICATIONS  (PRN):  cyclobenzaprine 5 milliGRAM(s) Oral three times a day PRN Muscle Spasm  naloxone Injectable 0.1 milliGRAM(s) IV Push every 3 minutes PRN For ANY of the following changes in patient status:  A. RR LESS THAN 10 breaths per minute, B. Oxygen saturation LESS THAN 90%, C. Sedation score of 6  ondansetron Injectable 4 milliGRAM(s) IV Push every 6 hours PRN Nausea  oxyCODONE    IR 5 milliGRAM(s) Oral every 4 hours PRN Severe Pain (7 - 10)  oxyCODONE    IR 2.5 milliGRAM(s) Oral every 4 hours PRN Moderate Pain (4 - 6)      PHYSICAL EXAM:  Constitutional: A&Ox3, NAD  Respiratory: Unlabored breathing  Abdomen: Mild distention without peritoneal signs. Vac along L shaped incision. Slight erythema along margins of both wounds.   Extremities: WWP, GE spontaneously    LABS:                        10.0   9.22  )-----------( 382      ( 22 Mar 2022 06:33 )             31.7     03-22    136  |  99  |  5<L>  ----------------------------<  135<H>  4.3   |  25  |  0.72    Ca    8.8      22 Mar 2022 06:31  Phos  3.1     03-22  Mg     2.1     03-22    TPro  6.4  /  Alb  3.3  /  TBili  0.6  /  DBili  x   /  AST  19  /  ALT  28  /  AlkPhos  81  03-22      LIVER FUNCTIONS - ( 22 Mar 2022 06:31 )  Alb: 3.3 g/dL / Pro: 6.4 g/dL / ALK PHOS: 81 U/L / ALT: 28 U/L / AST: 19 U/L / GGT: x

## 2022-03-23 NOTE — PROGRESS NOTE ADULT - ASSESSMENT
56y with PMH of HTN , Covid in August of 2021, Status post Dev procedure for perforated diverticulitis in 9/21@ [facility] / discharged home with an extended course of antibiotic and required wound VAC placement which was removed . Pt noted with abdominal bulge, c/o abdominal pain/discomfort noted with abdominal wall hernia. Patient now s/p Dev reversal and repair of midline abdominal wall defect (3/15). Patient requiring pressor support secondary to administration of epidural bupivacaine. Pain now controlled, patient recovering at the floor,     PLAN:  - monitor vac output, set up VNS for vac   - Pain control   - Incentive spirometry  - Monitor vitals  - Monitor bowel function  - Diet: LRD  - Monitor I+Os  - OOB/ Ambulate   - DVT ppx  - care per primary team (red team surgery)    Green Surgery   [id number]

## 2022-03-23 NOTE — PROGRESS NOTE ADULT - ASSESSMENT
56y with PMH of HTN , Covid in August of 2021, Status post Dev procedure for perforated diverticulitis in 9/21@ [facility] / discharged home with an extended course of antibiotic and required wound VAC placement which was removed . Pt noted with abdominal bulge, c/o abdominal pain/discomfort noted with abdominal wall hernia. Patient now s/p Dev reversal and repair of midline abdominal wall defect (3/15). Patient requiring pressor support secondary to administration of epidural bupivacaine. Pain now controlled, patient recovering at the floor,     PLAN:  - monitor vac output, set up VNS for vac   - Pain control   - Incentive spirometry  - Monitor vitals  - Monitor bowel function  - Diet: LRD  - Monitor I+Os  - OOB/ Ambulate   - DVT ppx  Green Surgery   [id number]  56y with PMH of HTN , Covid in August of 2021, Status post Dev procedure for perforated diverticulitis in 9/21@ [facility] / discharged home with an extended course of antibiotic and required wound VAC placement which was removed . Pt noted with abdominal bulge, c/o abdominal pain/discomfort noted with abdominal wall hernia. Patient now s/p Dev reversal and repair of midline abdominal wall defect (3/15). Patient requiring pressor support secondary to administration of epidural bupivacaine. Pain now controlled, patient recovering at the floor,     PLAN:  - DC home today 3/23 with vac  - monitor vac output, set up VNS for vac   - Pain control   - Incentive spirometry  - Monitor vitals  - Monitor bowel function  - Diet: LRD  - Monitor I+Os  - OOB/ Ambulate   - DVT ppx  - [facility] [address] [place] [address] Surgery   [id number]

## 2022-03-23 NOTE — PROGRESS NOTE ADULT - SUBJECTIVE AND OBJECTIVE BOX
Green Team Surgery Progress Note      SUBJECTIVE: Patient seen and examined at bedside with surgical team.     OBJECTIVE:    Vital Signs Last 24 Hrs  T(C): 36.9 (23 Mar 2022 01:00), Max: 37.1 (22 Mar 2022 08:52)  T(F): 98.5 (23 Mar 2022 01:00), Max: 98.7 (22 Mar 2022 08:52)  HR: 81 (23 Mar 2022 01:00) (81 - 92)  BP: 130/75 (23 Mar 2022 01:00) (104/64 - 143/78)  BP(mean): --  RR: 18 (23 Mar 2022 01:00) (18 - 18)  SpO2: 98% (23 Mar 2022 01:00) (95% - 100%)I&O's Detail    21 Mar 2022 07:01  -  22 Mar 2022 07:00  --------------------------------------------------------  IN:    dextrose 5% + sodium chloride 0.45% w/ Additives: 2150 mL    IV PiggyBack: 400 mL    IV PiggyBack: 250 mL    IV PiggyBack: 50 mL    Oral Fluid: 2520 mL  Total IN: 5370 mL    OUT:    Voided (mL): 1600 mL  Total OUT: 1600 mL    Total NET: 3770 mL      22 Mar 2022 07:01  -  23 Mar 2022 01:18  --------------------------------------------------------  IN:    Oral Fluid: 900 mL  Total IN: 900 mL    OUT:    VAC (Vacuum Assisted Closure) System (mL): 50 mL    Voided (mL): 400 mL  Total OUT: 450 mL    Total NET: 450 mL      MEDICATIONS  (STANDING):  dextrose 40% Gel 15 Gram(s) Oral once  dextrose 5%. 1000 milliLiter(s) (50 mL/Hr) IV Continuous <Continuous>  dextrose 5%. 1000 milliLiter(s) (100 mL/Hr) IV Continuous <Continuous>  dextrose 50% Injectable 25 Gram(s) IV Push once  dextrose 50% Injectable 12.5 Gram(s) IV Push once  dextrose 50% Injectable 25 Gram(s) IV Push once  glucagon  Injectable 1 milliGRAM(s) IntraMuscular once  heparin   Injectable 5000 Unit(s) SubCutaneous every 8 hours  ibuprofen  Tablet. 400 milliGRAM(s) Oral every 6 hours  insulin lispro (ADMELOG) corrective regimen sliding scale   SubCutaneous three times a day before meals  insulin lispro (ADMELOG) corrective regimen sliding scale   SubCutaneous at bedtime  pantoprazole  Injectable 40 milliGRAM(s) IV Push daily    MEDICATIONS  (PRN):  cyclobenzaprine 5 milliGRAM(s) Oral three times a day PRN Muscle Spasm  naloxone Injectable 0.1 milliGRAM(s) IV Push every 3 minutes PRN For ANY of the following changes in patient status:  A. RR LESS THAN 10 breaths per minute, B. Oxygen saturation LESS THAN 90%, C. Sedation score of 6  ondansetron Injectable 4 milliGRAM(s) IV Push every 6 hours PRN Nausea  oxyCODONE    IR 5 milliGRAM(s) Oral every 4 hours PRN Severe Pain (7 - 10)  oxyCODONE    IR 2.5 milliGRAM(s) Oral every 4 hours PRN Moderate Pain (4 - 6)      PHYSICAL EXAM:  GENERAL: NAD, lying in bed comfortably. Pleasant and responsive  CHEST/LUNG: Unlabored respirations. Comfortable on room air  ABDOMEN: Mildly resolved, Vac along L shaped incision. Slight erythema along margins of both wounds.    LABS:                        10.0   9.22  )-----------( 382      ( 22 Mar 2022 06:33 )             31.7     03-22    136  |  99  |  5<L>  ----------------------------<  135<H>  4.3   |  25  |  0.72    Ca    8.8      22 Mar 2022 06:31  Phos  3.1     03-22  Mg     2.1     03-22    TPro  6.4  /  Alb  3.3  /  TBili  0.6  /  DBili  x   /  AST  19  /  ALT  28  /  AlkPhos  81  03-22      LIVER FUNCTIONS - ( 22 Mar 2022 06:31 )  Alb: 3.3 g/dL / Pro: 6.4 g/dL / ALK PHOS: 81 U/L / ALT: 28 U/L / AST: 19 U/L / GGT: x                  Green Team Surgery Progress Note      SUBJECTIVE: Patient seen and examined at bedside with surgical team. Seen this morning, says he is tolerating his diet, no n/v. Continues to pass gas. Pain controlled.     OBJECTIVE:    Vital Signs Last 24 Hrs  T(C): 36.9 (23 Mar 2022 01:00), Max: 37.1 (22 Mar 2022 08:52)  T(F): 98.5 (23 Mar 2022 01:00), Max: 98.7 (22 Mar 2022 08:52)  HR: 81 (23 Mar 2022 01:00) (81 - 92)  BP: 130/75 (23 Mar 2022 01:00) (104/64 - 143/78)  BP(mean): --  RR: 18 (23 Mar 2022 01:00) (18 - 18)  SpO2: 98% (23 Mar 2022 01:00) (95% - 100%)I&O's Detail    21 Mar 2022 07:01  -  22 Mar 2022 07:00  --------------------------------------------------------  IN:    dextrose 5% + sodium chloride 0.45% w/ Additives: 2150 mL    IV PiggyBack: 400 mL    IV PiggyBack: 250 mL    IV PiggyBack: 50 mL    Oral Fluid: 2520 mL  Total IN: 5370 mL    OUT:    Voided (mL): 1600 mL  Total OUT: 1600 mL    Total NET: 3770 mL      22 Mar 2022 07:01  -  23 Mar 2022 01:18  --------------------------------------------------------  IN:    Oral Fluid: 900 mL  Total IN: 900 mL    OUT:    VAC (Vacuum Assisted Closure) System (mL): 50 mL    Voided (mL): 400 mL  Total OUT: 450 mL    Total NET: 450 mL      MEDICATIONS  (STANDING):  dextrose 40% Gel 15 Gram(s) Oral once  dextrose 5%. 1000 milliLiter(s) (50 mL/Hr) IV Continuous <Continuous>  dextrose 5%. 1000 milliLiter(s) (100 mL/Hr) IV Continuous <Continuous>  dextrose 50% Injectable 25 Gram(s) IV Push once  dextrose 50% Injectable 12.5 Gram(s) IV Push once  dextrose 50% Injectable 25 Gram(s) IV Push once  glucagon  Injectable 1 milliGRAM(s) IntraMuscular once  heparin   Injectable 5000 Unit(s) SubCutaneous every 8 hours  ibuprofen  Tablet. 400 milliGRAM(s) Oral every 6 hours  insulin lispro (ADMELOG) corrective regimen sliding scale   SubCutaneous three times a day before meals  insulin lispro (ADMELOG) corrective regimen sliding scale   SubCutaneous at bedtime  pantoprazole  Injectable 40 milliGRAM(s) IV Push daily    MEDICATIONS  (PRN):  cyclobenzaprine 5 milliGRAM(s) Oral three times a day PRN Muscle Spasm  naloxone Injectable 0.1 milliGRAM(s) IV Push every 3 minutes PRN For ANY of the following changes in patient status:  A. RR LESS THAN 10 breaths per minute, B. Oxygen saturation LESS THAN 90%, C. Sedation score of 6  ondansetron Injectable 4 milliGRAM(s) IV Push every 6 hours PRN Nausea  oxyCODONE    IR 5 milliGRAM(s) Oral every 4 hours PRN Severe Pain (7 - 10)  oxyCODONE    IR 2.5 milliGRAM(s) Oral every 4 hours PRN Moderate Pain (4 - 6)      PHYSICAL EXAM:  GENERAL: NAD, lying in bed comfortably. Pleasant and responsive  CHEST/LUNG: Unlabored respirations. Comfortable on room air  ABDOMEN: Mildly resolved, Vac along L shaped incision. Slight erythema along margins of both wounds.    LABS:                        10.0   9.22  )-----------( 382      ( 22 Mar 2022 06:33 )             31.7     03-22    136  |  99  |  5<L>  ----------------------------<  135<H>  4.3   |  25  |  0.72    Ca    8.8      22 Mar 2022 06:31  Phos  3.1     03-22  Mg     2.1     03-22    TPro  6.4  /  Alb  3.3  /  TBili  0.6  /  DBili  x   /  AST  19  /  ALT  28  /  AlkPhos  81  03-22      LIVER FUNCTIONS - ( 22 Mar 2022 06:31 )  Alb: 3.3 g/dL / Pro: 6.4 g/dL / ALK PHOS: 81 U/L / ALT: 28 U/L / AST: 19 U/L / GGT: x

## 2022-03-23 NOTE — DISCHARGE NOTE NURSING/CASE MANAGEMENT/SOCIAL WORK - NSDCPEFALRISK_GEN_ALL_CORE
For information on Fall & Injury Prevention, visit: [email or web address] OR  [email or web address] OR  https://www.cdc.gov/steadi/patient.html

## 2022-03-23 NOTE — DISCHARGE NOTE NURSING/CASE MANAGEMENT/SOCIAL WORK - NSSCTYPOFSERV_GEN_ALL_CORE
Skilled nursing visits for wound vac dressing changes every Mon/Wed/Fri. RN will call 1-2 days after discharge to schedule visit for Fri 3/25.

## 2022-03-25 LAB
CULTURE RESULTS: SIGNIFICANT CHANGE UP
CULTURE RESULTS: SIGNIFICANT CHANGE UP
SPECIMEN SOURCE: SIGNIFICANT CHANGE UP
SPECIMEN SOURCE: SIGNIFICANT CHANGE UP

## 2022-03-28 LAB — SURGICAL PATHOLOGY STUDY: SIGNIFICANT CHANGE UP

## 2022-03-30 ENCOUNTER — APPOINTMENT (OUTPATIENT)
Dept: SURGERY | Facility: CLINIC | Age: 57
End: 2022-03-30

## 2022-03-30 ENCOUNTER — APPOINTMENT (OUTPATIENT)
Dept: COLORECTAL SURGERY | Facility: CLINIC | Age: 57
End: 2022-03-30
Payer: COMMERCIAL

## 2022-03-30 VITALS
HEART RATE: 91 BPM | SYSTOLIC BLOOD PRESSURE: 125 MMHG | DIASTOLIC BLOOD PRESSURE: 81 MMHG | TEMPERATURE: 97.6 F | OXYGEN SATURATION: 97 % | RESPIRATION RATE: 16 BRPM

## 2022-03-30 PROCEDURE — 99024 POSTOP FOLLOW-UP VISIT: CPT

## 2022-03-30 NOTE — PHYSICAL EXAM
[de-identified] : The area where the neris were placed postoperatively is infected but now open the VAC dressing has been discontinued and wet-to-dry dressings will be used.

## 2022-03-30 NOTE — ASSESSMENT
[FreeTextEntry1] : Wound care was discussed with the patient.  I have told the patient that I expect that he will develop a recurrent incisional hernia which I would not think about repairing for at least a year.  He is to return in 1 week.

## 2022-03-30 NOTE — CONSULT LETTER
[Dear  ___] : Dear  [unfilled], [Courtesy Letter:] : I had the pleasure of seeing your patient, [unfilled], in my office today. [Please see my note below.] : Please see my note below. [Sincerely,] : Sincerely, [FreeTextEntry3] : I have reviewed all the documentation for this encounter with the patient and have edited where appropriate\par \par Dr. Mann Wagner

## 2022-03-30 NOTE — HISTORY OF PRESENT ILLNESS
[de-identified] : Ramesh is a 55 y/o male s/p repair of incisional hernia with mesh and VAC placement and Cystoscopy, insertion of bilateral ureteral catheters.  My operative procedure is takedown of colostomy, extensive lysis of adhesions, anterior resection, intraoperative sigmoidoscopy on 03/15/22 with Dr. Chi Campos and \par Dr. Bernadine Mascorro.  The patient then had an underlay of over tech 2S with fascia and old hernia sac closed over the top.  The patient now has some drainage from the wound

## 2022-03-31 NOTE — HISTORY OF PRESENT ILLNESS
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 The patient's postoperative course was prolonged due to a postoperative ileus. The patient preoperatively had a problem with chronic pain and has taken oral narcotics for a significant period of time preoperatively.\par \par Physical examination reveals a very large and protuberant abdomen. The existing VAC dressing was taken down. Generally the wound looks quite good and is rubia and granulating nicely. There is one area in the superior aspect of the wound which has a significant amount of liquid present. When this is removed there is an area of necrotic tissue present. When this was debrided a suture from the hernia repair is evident in the base of the wound.\par \par I believe the VAC can be discontinued. Patient was advised to shower daily and replace a dry sterile dressing. I asked him to follow-up with general surgery regarding the wound.\par \par I will see him in 4 weeks for a sigmoidoscopy under sedation.

## 2022-04-04 NOTE — REASON FOR VISIT
Reason for Call:  Other - Prescription Request: rosuvastatin (CRESTOR) 20 MG tablet    Detailed comments: Patient asked if Dr. Andrade's care [phone] Please call patient if any other questions.    Phone Number Patient can be reached at: Home number on file [phone] (home)    Best Time: Anytime    Can we leave a detailed message on this number? YES    Call taken on 10/31/2017 at 3:17 PM by Aliyah Baron      
Telephone call to Andrew to let him know [facility] for Crestor [facility] earlier today.  Jackelyn Torres RN    
[Post Op: _________] : a [unfilled] post op visit

## 2022-04-05 ENCOUNTER — APPOINTMENT (OUTPATIENT)
Dept: SURGERY | Facility: CLINIC | Age: 57
End: 2022-04-05
Payer: COMMERCIAL

## 2022-04-05 VITALS
RESPIRATION RATE: 16 BRPM | OXYGEN SATURATION: 97 % | HEART RATE: 95 BPM | TEMPERATURE: 97.6 F | SYSTOLIC BLOOD PRESSURE: 146 MMHG | DIASTOLIC BLOOD PRESSURE: 91 MMHG

## 2022-04-05 PROCEDURE — 99024 POSTOP FOLLOW-UP VISIT: CPT

## 2022-04-05 NOTE — HISTORY OF PRESENT ILLNESS
[de-identified] : Ramesh is a 56 year old male here for post op visit s/p repair of incisional hernia with mesh and VAC placement. Cystoscopy, insertion of bilateral ureteral catheters by Dr. Bernadine Mascorro. Takedown of colostomy, extensive lysis of adhesions, anterior resection, intraoperative sigmoidoscopy 3/15/22 by Dr. Chi Campos. \par Last seen 3/30/22- The area where the neris were placed postoperatively is infected but now open the VAC dressing has been discontinued and wet-to-dry dressings will be used. Wound care was discussed with the patient. I have told the patient that I expect that he will develop a recurrent incisional hernia which I would not think about repairing for at least a year. He is to return in 1 week. \par \par Today pt reports abdominal pain 9/10. Daily drainage from surgical incision, dressing changed daily. Pt taking Tylenol and Advil around the clock, Oxycodone for breakthrough pain. Formed BM daily. Good appetite, no nausea, no vomiting. Denies fever and chills. Pt reports "red dots"  on upper bilateral thighs for the last week, pain.

## 2022-04-05 NOTE — PHYSICAL EXAM
[de-identified] : The surgical wound is markedly improved with much less purulent drainage.  The stoma site is granulating.

## 2022-04-14 ENCOUNTER — APPOINTMENT (OUTPATIENT)
Dept: SURGERY | Facility: CLINIC | Age: 57
End: 2022-04-14
Payer: COMMERCIAL

## 2022-04-14 VITALS
DIASTOLIC BLOOD PRESSURE: 91 MMHG | HEART RATE: 92 BPM | OXYGEN SATURATION: 97 % | SYSTOLIC BLOOD PRESSURE: 146 MMHG | TEMPERATURE: 97.7 F | RESPIRATION RATE: 16 BRPM

## 2022-04-14 PROCEDURE — 99024 POSTOP FOLLOW-UP VISIT: CPT

## 2022-04-14 NOTE — ASSESSMENT
[FreeTextEntry1] : Wound care and activity were discussed with the patient.  The patient has had complaint of some sagging this and bulging on the flanks which is secondary to the Botox injections which were used preoperatively I have told the patient that that can last for another 2 to 3 months.

## 2022-04-14 NOTE — HISTORY OF PRESENT ILLNESS
[de-identified] : Ramesh is a 56 year old male here for post op visit s/p repair of incisional hernia with mesh and VAC placement. Cystoscopy, insertion of bilateral ureteral catheters by Dr. Bernadine Mascorro. Takedown of colostomy, extensive lysis of adhesions, anterior resection, intraoperative sigmoidoscopy 3/15/22 by Dr. Chi Campos. Today patient reports 8/9 out 10 pain level. Drainage has decrease. Denies any nausea or vomiting and appetite is good.

## 2022-04-27 ENCOUNTER — APPOINTMENT (OUTPATIENT)
Dept: COLORECTAL SURGERY | Facility: CLINIC | Age: 57
End: 2022-04-27

## 2022-04-27 ENCOUNTER — APPOINTMENT (OUTPATIENT)
Dept: COLORECTAL SURGERY | Facility: CLINIC | Age: 57
End: 2022-04-27
Payer: COMMERCIAL

## 2022-04-27 PROCEDURE — 45330 DIAGNOSTIC SIGMOIDOSCOPY: CPT | Mod: 58

## 2022-04-28 ENCOUNTER — APPOINTMENT (OUTPATIENT)
Dept: SURGERY | Facility: CLINIC | Age: 57
End: 2022-04-28
Payer: COMMERCIAL

## 2022-04-28 VITALS
SYSTOLIC BLOOD PRESSURE: 155 MMHG | HEART RATE: 87 BPM | RESPIRATION RATE: 17 BRPM | OXYGEN SATURATION: 98 % | DIASTOLIC BLOOD PRESSURE: 93 MMHG | TEMPERATURE: 97.6 F

## 2022-04-28 PROCEDURE — 99024 POSTOP FOLLOW-UP VISIT: CPT

## 2022-04-28 NOTE — ASSESSMENT
[FreeTextEntry1] : Wound care activity were discussed with the patient the patient is to return in a couple of weeks.

## 2022-04-28 NOTE — PHYSICAL EXAM
[de-identified] : There is 1 small area in the midportion of the abdominal incision which is still opened.  There is a piece of a Maxon suture present which I debrided.

## 2022-04-28 NOTE — HISTORY OF PRESENT ILLNESS
[de-identified] : Ramesh is a 56 year old male here for post op visit s/p repair of incisional hernia with mesh and VAC placement. Cystoscopy, insertion of bilateral ureteral catheters by Dr. Bernadine Mascorro. Takedown of colostomy, extensive lysis of adhesions, anterior resection, intraoperative sigmoidoscopy 3/15/22 by Dr. Chi Campos. \par Last seen 4/14/22- The midline surgical wound continues to close slowly. Wound care and activity were discussed with the patient. The patient has had complaint of some sagging this and bulging on the flanks which is secondary to the Botox injections which were used preoperatively I have told the patient that that can last for another 2 to 3 months.\par \par Today pt reports abdominal pain 8/10. Taking Tylenol, Ibuprofen and Oxycodone for pain.  Pt reports daily drainage from surgical incision, changing dressing daily. Good appetite, no nausea, no vomiting. Denies fever and chills.

## 2022-05-12 ENCOUNTER — APPOINTMENT (OUTPATIENT)
Dept: SURGERY | Facility: CLINIC | Age: 57
End: 2022-05-12
Payer: COMMERCIAL

## 2022-05-12 VITALS
DIASTOLIC BLOOD PRESSURE: 91 MMHG | TEMPERATURE: 97.5 F | HEART RATE: 84 BPM | RESPIRATION RATE: 16 BRPM | SYSTOLIC BLOOD PRESSURE: 145 MMHG | OXYGEN SATURATION: 99 %

## 2022-05-12 PROCEDURE — 99024 POSTOP FOLLOW-UP VISIT: CPT

## 2022-05-12 NOTE — HISTORY OF PRESENT ILLNESS
[de-identified] : Ramesh is a 56 year old male here for post op visit s/p repair of incisional hernia with mesh and VAC placement. Cystoscopy, insertion of bilateral ureteral catheters by Dr. Bernadine Mascorro. Takedown of colostomy, extensive lysis of adhesions, anterior resection, intraoperative sigmoidoscopy 3/15/22 by Dr. Chi Campos. \par Last seen 4/28/22- There is 1 small area in the midportion of the abdominal incision which is still opened. There is a piece of a Maxon suture present which I debrided. Wound care activity were discussed with the patient the patient is to return in a couple of weeks.  The previous area of debridement is pinhole size now.\par \par Today pt reports abdominal soreness, takes Percocet as needed. Reports daily drainage from surgical incision. Formed BMs daily. Good appetite, no nausea, no vomiting. Denies fever and chills.

## 2022-05-12 NOTE — ASSESSMENT
[FreeTextEntry1] : Wound care and activity were discussed with the patient.  The patient is to return in 1 month.

## 2022-06-07 ENCOUNTER — APPOINTMENT (OUTPATIENT)
Dept: SURGERY | Facility: CLINIC | Age: 57
End: 2022-06-07
Payer: COMMERCIAL

## 2022-06-07 PROCEDURE — 99024 POSTOP FOLLOW-UP VISIT: CPT

## 2022-06-07 NOTE — HISTORY OF PRESENT ILLNESS
[de-identified] : Ramesh is a 56 year old male here for post op visit s/p repair of incisional hernia with mesh and VAC placement. Cystoscopy, insertion of bilateral ureteral catheters by Dr. Bernadine Mascorro. Takedown of colostomy, extensive lysis of adhesions, anterior resection, intraoperative sigmoidoscopy 3/15/22 by Dr. Chi Campos. \par Last seen 5/12/22- Pinhole size defect in the incision is now present smaller than previous. Wound care and activity were discussed with the patient. The patient is to return in 1 month. \par Pt reports abdominal pain, taking Tylenol and Advil during the day, Percocet at night. Denies drainage from surgical incision. Formed BMs daily. Good appetite, no nausea, no vomiting. Denies fever and chills.

## 2022-06-07 NOTE — PHYSICAL EXAM
[de-identified] : The patient on raising his legs while lying flat there appears to be the beginning of a bulge whether this is a true hernia or diastases I am not sure.

## 2022-06-14 NOTE — H&P PST ADULT - HEIGHT IN FEET
With assistance of Mica ROMERO, patient is now scheduled for a follow-up with Dr. Coronado on 6/22/22. Medication reconciliation will be done at that visit.    Writer asked patient if she was having any cardiac symptoms at present; patient denied having any symptoms since discharge from the hospital. Writer instructed patient to call clinic if any symptoms arise before her follow-up visit. Patient verbalized understanding.   5

## 2022-07-21 ENCOUNTER — APPOINTMENT (OUTPATIENT)
Dept: SURGERY | Facility: CLINIC | Age: 57
End: 2022-07-21

## 2022-07-21 VITALS
RESPIRATION RATE: 18 BRPM | OXYGEN SATURATION: 98 % | SYSTOLIC BLOOD PRESSURE: 146 MMHG | HEART RATE: 95 BPM | TEMPERATURE: 97.1 F | DIASTOLIC BLOOD PRESSURE: 92 MMHG

## 2022-07-21 PROCEDURE — 99213 OFFICE O/P EST LOW 20 MIN: CPT

## 2022-07-21 NOTE — ASSESSMENT
[FreeTextEntry1] : Wound care and activity were discussed with the patient he is to return in September.

## 2022-07-21 NOTE — PHYSICAL EXAM
[de-identified] : There is an obvious weakness in the midportion of the abdomen which is highly suggestive of an early recurrent incisional hernia.

## 2022-07-21 NOTE — HISTORY OF PRESENT ILLNESS
[de-identified] : Ramesh is a 56 year old male here for post op visit s/p repair of incisional hernia with mesh and VAC placement. Cystoscopy, insertion of bilateral ureteral catheters by Dr. Bernadine Mascorro. Takedown of colostomy, extensive lysis of adhesions, anterior resection, intraoperative sigmoidoscopy 3/15/22 by Dr. Chi Campos.  The hernia at the time of the colon resection was repaired with over tech which is a biologic.  Patient understands that eventually he most likely will develop an incisional hernia.  At the present time the wound is clean dry and intact

## 2022-09-08 ENCOUNTER — APPOINTMENT (OUTPATIENT)
Dept: SURGERY | Facility: CLINIC | Age: 57
End: 2022-09-08

## 2022-09-08 VITALS
TEMPERATURE: 97.2 F | RESPIRATION RATE: 17 BRPM | HEART RATE: 85 BPM | SYSTOLIC BLOOD PRESSURE: 131 MMHG | DIASTOLIC BLOOD PRESSURE: 88 MMHG | OXYGEN SATURATION: 97 %

## 2022-09-08 PROCEDURE — 99214 OFFICE O/P EST MOD 30 MIN: CPT

## 2022-09-08 NOTE — ASSESSMENT
[FreeTextEntry1] : I discussed with the patient that the earliest that I would attempt a repair of this incisional hernia would be in January of next year.  I have asked the patient to come back and see me in October of this year and have given him a prescription for a follow-up CT scan which needs to be done prior to that visit.  During that visit if all seems correct I will give him a prescription for Botox which my office will set him up to have done in the ultrasound suite at [facility] approximately 30 days prior to his surgery date.

## 2022-09-08 NOTE — PHYSICAL EXAM
[No HSM] : no hepatosplenomegaly [Abdominal Masses] : No abdominal masses [Abdomen Tenderness] : ~T ~M No abdominal tenderness [de-identified] : The incisional hernia has not changed in size and is still easily reducible

## 2022-09-24 ENCOUNTER — OUTPATIENT (OUTPATIENT)
Dept: OUTPATIENT SERVICES | Facility: HOSPITAL | Age: 57
LOS: 1 days | End: 2022-09-24
Payer: COMMERCIAL

## 2022-09-24 ENCOUNTER — RESULT REVIEW (OUTPATIENT)
Age: 57
End: 2022-09-24

## 2022-09-24 ENCOUNTER — APPOINTMENT (OUTPATIENT)
Dept: CT IMAGING | Facility: CLINIC | Age: 57
End: 2022-09-24

## 2022-09-24 DIAGNOSIS — Z98.890 OTHER SPECIFIED POSTPROCEDURAL STATES: Chronic | ICD-10-CM

## 2022-09-24 DIAGNOSIS — Z00.8 ENCOUNTER FOR OTHER GENERAL EXAMINATION: ICD-10-CM

## 2022-09-24 PROCEDURE — 71260 CT THORAX DX C+: CPT | Mod: 26

## 2022-09-24 PROCEDURE — 74177 CT ABD & PELVIS W/CONTRAST: CPT | Mod: 26

## 2022-09-24 PROCEDURE — 71260 CT THORAX DX C+: CPT

## 2022-09-24 PROCEDURE — 74177 CT ABD & PELVIS W/CONTRAST: CPT

## 2022-09-26 ENCOUNTER — APPOINTMENT (OUTPATIENT)
Dept: SURGERY | Facility: CLINIC | Age: 57
End: 2022-09-26

## 2022-09-26 VITALS
HEART RATE: 80 BPM | BODY MASS INDEX: 33.6 KG/M2 | OXYGEN SATURATION: 97 % | HEIGHT: 71 IN | WEIGHT: 240 LBS | SYSTOLIC BLOOD PRESSURE: 113 MMHG | DIASTOLIC BLOOD PRESSURE: 79 MMHG | TEMPERATURE: 98.5 F

## 2022-09-26 PROCEDURE — 99213 OFFICE O/P EST LOW 20 MIN: CPT

## 2022-09-26 PROCEDURE — 99203 OFFICE O/P NEW LOW 30 MIN: CPT

## 2022-09-26 NOTE — PHYSICAL EXAM
[No Rash or Lesion] : No rash or lesion [Alert] : alert [Oriented to Person] : oriented to person [Oriented to Place] : oriented to place [Calm] : calm [de-identified] : Comfortable, well appearing [de-identified] : breathing comfortably on room air, no cough [de-identified] : soft, not tender and not distended\par surgical scars noted, and well healed without any surrounding erythema\par easily reducible incisional hernia

## 2022-09-26 NOTE — HISTORY OF PRESENT ILLNESS
[de-identified] : 57 year old man who underwent emergent Dev's procedure at [facility] in September of 2021.\par Underwent colostomy reversal (Dr. Chi Campos) and hernia repair (Dr. Mann Wagner, and Dr. John Paul Javier) with underlay Ovitex 2S 3/15/2022. \par \par He developed a recurrent incisional hernia, and presents to the office today for a second opinion.  [de-identified] : He currently denies any nausea, vomiting, fever or chills. He is tolerating PO intake with normal GI and  function. \par He states that bending over in certain positions causes him some more discomfort. He wears an abdominal binder for comfort. \par He states he is interested in a combination case with plastic surgery.

## 2022-09-26 NOTE — PLAN
[FreeTextEntry1] : 57 year old man with recurrent incisional hernia\par - discussed with patient that I am in agreement with Dr. Mann Wagner regarding timing for surgery; would wait until the beginning of 2023. Discussed that I agreed that pre-operative botox may be beneficial in this case. \par - offered patient an attempt at MIS repair; but patient states he is interested in a combination case with plastic surgery. I explained this would be an open procedure requiring longer recovery and require more logistical planning. Patient understands, and prefers the combination procedure\par - referral to plastic surgery for evaluation

## 2022-10-06 ENCOUNTER — APPOINTMENT (OUTPATIENT)
Dept: PLASTIC SURGERY | Facility: CLINIC | Age: 57
End: 2022-10-06
Payer: COMMERCIAL

## 2022-10-06 VITALS
OXYGEN SATURATION: 97 % | HEART RATE: 94 BPM | WEIGHT: 240 LBS | SYSTOLIC BLOOD PRESSURE: 132 MMHG | DIASTOLIC BLOOD PRESSURE: 83 MMHG | BODY MASS INDEX: 33.6 KG/M2 | TEMPERATURE: 97.1 F | HEIGHT: 71 IN

## 2022-10-06 PROCEDURE — XXXXX: CPT | Mod: 1L

## 2022-10-06 RX ORDER — OXYCODONE AND ACETAMINOPHEN 5; 325 MG/1; MG/1
5-325 TABLET ORAL
Qty: 28 | Refills: 0 | Status: DISCONTINUED | COMMUNITY
Start: 2022-04-05 | End: 2022-10-06

## 2022-10-06 RX ORDER — OXYCODONE AND ACETAMINOPHEN 5; 325 MG/1; MG/1
5-325 TABLET ORAL
Qty: 24 | Refills: 0 | Status: DISCONTINUED | COMMUNITY
Start: 2022-07-21 | End: 2022-10-06

## 2022-10-06 RX ORDER — OXYCODONE 5 MG/1
5 TABLET ORAL TWICE DAILY
Qty: 10 | Refills: 0 | Status: DISCONTINUED | COMMUNITY
Start: 2022-03-29 | End: 2022-10-06

## 2022-10-06 RX ORDER — MUPIROCIN 20 MG/G
2 OINTMENT TOPICAL
Qty: 1 | Refills: 0 | Status: DISCONTINUED | COMMUNITY
Start: 2022-02-28 | End: 2022-10-06

## 2022-10-06 RX ORDER — ASCORBIC ACID 500 MG
TABLET ORAL
Refills: 0 | Status: DISCONTINUED | COMMUNITY
End: 2022-10-06

## 2022-10-06 RX ORDER — OXYCODONE AND ACETAMINOPHEN 5; 325 MG/1; MG/1
5-325 TABLET ORAL
Qty: 30 | Refills: 0 | Status: DISCONTINUED | COMMUNITY
Start: 2022-09-27 | End: 2022-10-06

## 2022-10-06 RX ORDER — LISDEXAMFETAMINE DIMESYLATE 20 MG/1
20 CAPSULE ORAL
Qty: 30 | Refills: 0 | Status: DISCONTINUED | COMMUNITY
Start: 2022-05-05 | End: 2022-10-06

## 2022-10-06 RX ORDER — OXYCODONE AND ACETAMINOPHEN 5; 325 MG/1; MG/1
5-325 TABLET ORAL
Qty: 24 | Refills: 0 | Status: DISCONTINUED | COMMUNITY
Start: 2022-06-28 | End: 2022-10-06

## 2022-10-06 RX ORDER — AMLODIPINE BESYLATE 5 MG/1
5 TABLET ORAL
Refills: 0 | Status: ACTIVE | COMMUNITY

## 2022-10-06 RX ORDER — OXYCODONE AND ACETAMINOPHEN 5; 325 MG/1; MG/1
5-325 TABLET ORAL
Qty: 24 | Refills: 0 | Status: DISCONTINUED | COMMUNITY
Start: 2022-05-13 | End: 2022-10-06

## 2022-10-06 RX ORDER — MULTIVITAMIN
TABLET ORAL
Refills: 0 | Status: DISCONTINUED | COMMUNITY
End: 2022-10-06

## 2022-10-06 RX ORDER — SEMAGLUTIDE 1.34 MG/ML
2 INJECTION, SOLUTION SUBCUTANEOUS
Qty: 2 | Refills: 0 | Status: ACTIVE | COMMUNITY
Start: 2022-06-04

## 2022-10-06 RX ORDER — OXYCODONE 5 MG/1
5 TABLET ORAL
Refills: 0 | Status: DISCONTINUED | COMMUNITY
End: 2022-10-06

## 2022-10-06 RX ORDER — COLD-HOT PACK
EACH MISCELLANEOUS
Refills: 0 | Status: DISCONTINUED | COMMUNITY
End: 2022-10-06

## 2022-10-06 RX ORDER — OXYCODONE AND ACETAMINOPHEN 10; 325 MG/1; MG/1
10-325 TABLET ORAL
Qty: 24 | Refills: 0 | Status: DISCONTINUED | COMMUNITY
Start: 2022-04-28 | End: 2022-10-06

## 2022-10-06 RX ORDER — OXYCODONE AND ACETAMINOPHEN 5; 325 MG/1; MG/1
5-325 TABLET ORAL
Qty: 24 | Refills: 0 | Status: DISCONTINUED | COMMUNITY
Start: 2022-04-14 | End: 2022-10-06

## 2022-10-06 RX ORDER — PHENTERMINE HYDROCHLORIDE 37.5 MG/1
CAPSULE ORAL
Refills: 0 | Status: DISCONTINUED | COMMUNITY
End: 2022-10-06

## 2022-10-06 RX ORDER — OXYCODONE AND ACETAMINOPHEN 5; 325 MG/1; MG/1
5-325 TABLET ORAL
Qty: 24 | Refills: 0 | Status: DISCONTINUED | COMMUNITY
Start: 2022-09-08 | End: 2022-10-06

## 2022-10-06 RX ORDER — OXYCODONE AND ACETAMINOPHEN 5; 325 MG/1; MG/1
5-325 TABLET ORAL
Qty: 24 | Refills: 0 | Status: DISCONTINUED | COMMUNITY
Start: 2022-07-13 | End: 2022-10-06

## 2022-10-06 RX ORDER — OXYCODONE AND ACETAMINOPHEN 5; 325 MG/1; MG/1
5-325 TABLET ORAL
Qty: 24 | Refills: 0 | Status: DISCONTINUED | COMMUNITY
Start: 2022-06-07 | End: 2022-10-06

## 2022-10-06 RX ORDER — LISINOPRIL 10 MG/1
10 TABLET ORAL
Refills: 0 | Status: ACTIVE | COMMUNITY

## 2022-10-06 NOTE — CONSULT LETTER
[Dear  ___] : Dear  [unfilled], [Consult Letter:] : I had the pleasure of evaluating your patient, [unfilled]. [Please see my note below.] : Please see my note below. [Consult Closing:] : Thank you very much for allowing me to participate in the care of this patient.  If you have any questions, please do not hesitate to contact me. [Sincerely,] : Sincerely, [FreeTextEntry3] : Morales Bell MD, FACS

## 2022-10-20 ENCOUNTER — TRANSCRIPTION ENCOUNTER (OUTPATIENT)
Age: 57
End: 2022-10-20

## 2022-10-27 DIAGNOSIS — R10.9 UNSPECIFIED ABDOMINAL PAIN: ICD-10-CM

## 2022-11-01 ENCOUNTER — APPOINTMENT (OUTPATIENT)
Dept: SURGERY | Facility: CLINIC | Age: 57
End: 2022-11-01

## 2022-11-10 ENCOUNTER — APPOINTMENT (OUTPATIENT)
Dept: SURGERY | Facility: CLINIC | Age: 57
End: 2022-11-10

## 2022-11-10 ENCOUNTER — RESULT REVIEW (OUTPATIENT)
Age: 57
End: 2022-11-10

## 2022-11-10 VITALS
OXYGEN SATURATION: 99 % | HEIGHT: 71 IN | TEMPERATURE: 97.2 F | DIASTOLIC BLOOD PRESSURE: 81 MMHG | HEART RATE: 85 BPM | SYSTOLIC BLOOD PRESSURE: 134 MMHG | RESPIRATION RATE: 17 BRPM

## 2022-11-10 DIAGNOSIS — Z01.818 ENCOUNTER FOR OTHER PREPROCEDURAL EXAMINATION: ICD-10-CM

## 2022-11-10 PROCEDURE — 99214 OFFICE O/P EST MOD 30 MIN: CPT

## 2022-11-10 RX ORDER — SILDENAFIL 100 MG/1
100 TABLET, FILM COATED ORAL
Qty: 18 | Refills: 0 | Status: ACTIVE | COMMUNITY
Start: 2022-06-28

## 2022-11-10 RX ORDER — LISDEXAMFETAMINE DIMESYLATE 30 MG/1
30 CAPSULE ORAL
Qty: 30 | Refills: 0 | Status: ACTIVE | COMMUNITY
Start: 2022-09-19

## 2022-11-10 NOTE — PHYSICAL EXAM
[JVD] : no jugular venous distention  [Normal Breath Sounds] : Normal breath sounds [Normal Heart Sounds] : normal heart sounds [Abdominal Masses] : No abdominal masses [No HSM] : no hepatosplenomegaly [No Rash or Lesion] : No rash or lesion [Alert] : alert [Oriented to Person] : oriented to person [Oriented to Place] : oriented to place [Oriented to Time] : oriented to time [Calm] : calm [de-identified] : Well-developed well-nourished white male in no acute distress [de-identified] : Within normal limits radial nerves II through XII intact- [de-identified] : Recurrent incisional hernias present which is reducible and nontender. [de-identified] : Normal strength and gait

## 2022-11-10 NOTE — CONSULT LETTER
[Dear  ___] : Dear  [unfilled], [Courtesy Letter:] : I had the pleasure of seeing your patient, [unfilled], in my office today. [Please see my note below.] : Please see my note below. [Consult Closing:] : Thank you very much for allowing me to participate in the care of this patient.  If you have any questions, please do not hesitate to contact me. [Sincerely,] : Sincerely, [FreeTextEntry3] : I have reviewed all the documentation for this encounter with the patient and have edited where appropriate\par \par Dr. Mann Wagner [DrJericho  ___] : Dr. PINEDA

## 2022-11-10 NOTE — ASSESSMENT
[FreeTextEntry1] : A long and detailed discussion with the patient using Netter images to better show the anatomy of what needs to be done for this abdominal wall reconstruction for the recurrent incisional hernia.  Patient understands that we will again use Botox to increase the length of the lateral abdominal wall musculature.  Patient understands that plastic mesh will be used this time in the repair as this will considered a normal clean case.  The patient has seen Dr. Bell plastic surgeon whom he wishes to be involved in the reconstruction.\par \par We will swab the patient's nose for MRSA in preparation. \par \par The patient understands that he will need medical clearance from Dr. Morrow his primary care doctor prior to surgery\par \par We hope to schedule this operation in January and the patient understands that the Botox will need to be done approximately 30 days prior to surgery.

## 2022-11-11 LAB
MRSA SPEC QL CULT: NOT DETECTED
STAPH AUREUS (SA): NOT DETECTED

## 2022-12-05 ENCOUNTER — APPOINTMENT (OUTPATIENT)
Dept: PLASTIC SURGERY | Facility: CLINIC | Age: 57
End: 2022-12-05

## 2022-12-05 VITALS
HEART RATE: 95 BPM | TEMPERATURE: 98 F | DIASTOLIC BLOOD PRESSURE: 92 MMHG | HEIGHT: 71 IN | BODY MASS INDEX: 33.6 KG/M2 | OXYGEN SATURATION: 97 % | SYSTOLIC BLOOD PRESSURE: 163 MMHG | WEIGHT: 240 LBS

## 2022-12-05 PROCEDURE — 99213 OFFICE O/P EST LOW 20 MIN: CPT

## 2023-01-12 ENCOUNTER — APPOINTMENT (OUTPATIENT)
Dept: ULTRASOUND IMAGING | Facility: HOSPITAL | Age: 58
End: 2023-01-12

## 2023-01-12 ENCOUNTER — OUTPATIENT (OUTPATIENT)
Dept: OUTPATIENT SERVICES | Facility: HOSPITAL | Age: 58
LOS: 1 days | End: 2023-01-12
Payer: COMMERCIAL

## 2023-01-12 DIAGNOSIS — Z98.890 OTHER SPECIFIED POSTPROCEDURAL STATES: Chronic | ICD-10-CM

## 2023-01-12 DIAGNOSIS — K43.2 INCISIONAL HERNIA WITHOUT OBSTRUCTION OR GANGRENE: ICD-10-CM

## 2023-01-12 DIAGNOSIS — Z00.00 ENCOUNTER FOR GENERAL ADULT MEDICAL EXAMINATION WITHOUT ABNORMAL FINDINGS: ICD-10-CM

## 2023-01-12 PROCEDURE — 64646 CHEMODENERV TRUNK MUSC 1-5: CPT

## 2023-01-12 PROCEDURE — 64611 CHEMODENERV SALIV GLANDS: CPT | Mod: 52

## 2023-01-12 PROCEDURE — 76942 ECHO GUIDE FOR BIOPSY: CPT | Mod: 26

## 2023-01-12 PROCEDURE — 76942 ECHO GUIDE FOR BIOPSY: CPT

## 2023-01-18 RX ORDER — OXYCODONE AND ACETAMINOPHEN 5; 325 MG/1; MG/1
5-325 TABLET ORAL
Qty: 30 | Refills: 0 | Status: DISCONTINUED | COMMUNITY
Start: 2023-01-11 | End: 2023-01-18

## 2023-01-27 ENCOUNTER — OUTPATIENT (OUTPATIENT)
Dept: OUTPATIENT SERVICES | Facility: HOSPITAL | Age: 58
LOS: 1 days | End: 2023-01-27
Payer: COMMERCIAL

## 2023-01-27 VITALS
HEIGHT: 71 IN | HEART RATE: 88 BPM | DIASTOLIC BLOOD PRESSURE: 89 MMHG | RESPIRATION RATE: 16 BRPM | WEIGHT: 210.98 LBS | SYSTOLIC BLOOD PRESSURE: 128 MMHG | TEMPERATURE: 98 F | OXYGEN SATURATION: 97 %

## 2023-01-27 DIAGNOSIS — K43.2 INCISIONAL HERNIA WITHOUT OBSTRUCTION OR GANGRENE: ICD-10-CM

## 2023-01-27 DIAGNOSIS — Z29.9 ENCOUNTER FOR PROPHYLACTIC MEASURES, UNSPECIFIED: ICD-10-CM

## 2023-01-27 DIAGNOSIS — Z98.890 OTHER SPECIFIED POSTPROCEDURAL STATES: Chronic | ICD-10-CM

## 2023-01-27 DIAGNOSIS — Z93.3 COLOSTOMY STATUS: Chronic | ICD-10-CM

## 2023-01-27 DIAGNOSIS — Z01.818 ENCOUNTER FOR OTHER PREPROCEDURAL EXAMINATION: ICD-10-CM

## 2023-01-27 DIAGNOSIS — R10.9 UNSPECIFIED ABDOMINAL PAIN: ICD-10-CM

## 2023-01-27 DIAGNOSIS — I10 ESSENTIAL (PRIMARY) HYPERTENSION: ICD-10-CM

## 2023-01-27 LAB
A1C WITH ESTIMATED AVERAGE GLUCOSE RESULT: 5.9 % — HIGH (ref 4–5.6)
ANION GAP SERPL CALC-SCNC: 13 MMOL/L — SIGNIFICANT CHANGE UP (ref 5–17)
BLD GP AB SCN SERPL QL: NEGATIVE — SIGNIFICANT CHANGE UP
BUN SERPL-MCNC: 19 MG/DL — SIGNIFICANT CHANGE UP (ref 7–23)
CALCIUM SERPL-MCNC: 10.3 MG/DL — SIGNIFICANT CHANGE UP (ref 8.4–10.5)
CHLORIDE SERPL-SCNC: 101 MMOL/L — SIGNIFICANT CHANGE UP (ref 96–108)
CO2 SERPL-SCNC: 24 MMOL/L — SIGNIFICANT CHANGE UP (ref 22–31)
CREAT SERPL-MCNC: 0.74 MG/DL — SIGNIFICANT CHANGE UP (ref 0.5–1.3)
EGFR: 105 ML/MIN/1.73M2 — SIGNIFICANT CHANGE UP
ESTIMATED AVERAGE GLUCOSE: 123 MG/DL — HIGH (ref 68–114)
GLUCOSE SERPL-MCNC: 96 MG/DL — SIGNIFICANT CHANGE UP (ref 70–99)
HCT VFR BLD CALC: 47.2 % — SIGNIFICANT CHANGE UP (ref 39–50)
HGB BLD-MCNC: 15.4 G/DL — SIGNIFICANT CHANGE UP (ref 13–17)
MCHC RBC-ENTMCNC: 30.1 PG — SIGNIFICANT CHANGE UP (ref 27–34)
MCHC RBC-ENTMCNC: 32.6 GM/DL — SIGNIFICANT CHANGE UP (ref 32–36)
MCV RBC AUTO: 92.2 FL — SIGNIFICANT CHANGE UP (ref 80–100)
MRSA PCR RESULT.: SIGNIFICANT CHANGE UP
NRBC # BLD: 0 /100 WBCS — SIGNIFICANT CHANGE UP (ref 0–0)
PLATELET # BLD AUTO: 279 K/UL — SIGNIFICANT CHANGE UP (ref 150–400)
POTASSIUM SERPL-MCNC: 4.7 MMOL/L — SIGNIFICANT CHANGE UP (ref 3.5–5.3)
POTASSIUM SERPL-SCNC: 4.7 MMOL/L — SIGNIFICANT CHANGE UP (ref 3.5–5.3)
RBC # BLD: 5.12 M/UL — SIGNIFICANT CHANGE UP (ref 4.2–5.8)
RBC # FLD: 12 % — SIGNIFICANT CHANGE UP (ref 10.3–14.5)
RH IG SCN BLD-IMP: POSITIVE — SIGNIFICANT CHANGE UP
S AUREUS DNA NOSE QL NAA+PROBE: SIGNIFICANT CHANGE UP
SODIUM SERPL-SCNC: 138 MMOL/L — SIGNIFICANT CHANGE UP (ref 135–145)
WBC # BLD: 6.6 K/UL — SIGNIFICANT CHANGE UP (ref 3.8–10.5)
WBC # FLD AUTO: 6.6 K/UL — SIGNIFICANT CHANGE UP (ref 3.8–10.5)

## 2023-01-27 PROCEDURE — G0463: CPT

## 2023-01-27 PROCEDURE — 87641 MR-STAPH DNA AMP PROBE: CPT

## 2023-01-27 PROCEDURE — 85027 COMPLETE CBC AUTOMATED: CPT

## 2023-01-27 PROCEDURE — 86900 BLOOD TYPING SEROLOGIC ABO: CPT

## 2023-01-27 PROCEDURE — 87640 STAPH A DNA AMP PROBE: CPT

## 2023-01-27 PROCEDURE — 86901 BLOOD TYPING SEROLOGIC RH(D): CPT

## 2023-01-27 PROCEDURE — 80048 BASIC METABOLIC PNL TOTAL CA: CPT

## 2023-01-27 PROCEDURE — 83036 HEMOGLOBIN GLYCOSYLATED A1C: CPT

## 2023-01-27 PROCEDURE — 86850 RBC ANTIBODY SCREEN: CPT

## 2023-01-27 RX ORDER — ACETAMINOPHEN 500 MG
3 TABLET ORAL
Qty: 0 | Refills: 0 | DISCHARGE

## 2023-01-27 RX ORDER — LISINOPRIL 2.5 MG/1
1 TABLET ORAL
Qty: 0 | Refills: 0 | DISCHARGE

## 2023-01-27 NOTE — H&P PST ADULT - PROBLEM SELECTOR PLAN 1
Recurrent incisional hernia repair, reconstruction of abdominal wall, possible component separation, possible muscle flaps, possible mesh placements/wound vac placement on 2/10/23  Labs- CBC, BMP, Hb A1C, MRSA/MSSA, T&S  Pre op instructions discussed as per ERP protocol, verbalized understanding  Pre op PCP evaluation

## 2023-01-27 NOTE — H&P PST ADULT - NSICDXPASTMEDICALHX_GEN_ALL_CORE_FT
PAST MEDICAL HISTORY:  Abdominal wall hernia     COVID-19 virus infection     HTN (hypertension)     Obesity BMI-33    Perforated bowel      PAST MEDICAL HISTORY:  Abdominal wall hernia     COVID-19 virus infection     HTN (hypertension)     Perforated bowel     Prediabetes

## 2023-01-27 NOTE — H&P PST ADULT - PROBLEM SELECTOR PLAN 3
Advanced Heart Failure Office Note      Date of Visit:  8/8/2022  Patient Name:  Lisette Leary  Medical Record Number:  [id number]   YOB: 1936   Primary Physician:  David Weeks MD.          History of Presenting Illness:    Lisette Leary is a 86 year old female with a history of longstanding high blood pressure for the last 10 years, hypothyroidism, recent development of atrial fibrillation for last month, last hospitalization for heart failure preserved ejection fraction April 2021, document of severe mitral regurgitation, status post mitral clip May 2021, who comes for follow-up.  Since her last visit, the patient continues to remain stable.  She has lost weight with routine walking.  She is tolerating her medication therapies.  She does have bruising with the Eliquis dosing though this was decreased to 2.5 mg twice daily.  She is only intermittently taking aspirin therapy.  She denies any further bleeding.  Her laboratory data remained stable.    HEART FAILURE MEDICATIONS:   [] ACEi [] ARB [] ARNI :  [x] BB (beta blocker):  [] Corlanor:  [] DIG (digoxin):  [x] Diuretic:   [] Hydralazine [] Nitrate:     [] MRA:   [] SGLT2i:    DEVICES:   [] ICD (implantable cardioverter-defibrillator)  [] BIV (biventricular) - ICD [x] PPM (permanent pacemaker)  [] Life Vest   [] CardioMEMS         New York Heart Association Classification:   NYHA Class II      REVIEW OF SYSTEMS:    Review of Systems   Constitutional: Positive for weight loss (wt down to 5.3 lbs from LOV). Negative for chills, fever and weight gain (wt up to 4.4 lbs from LOV).   HENT: Negative for hearing loss.    Eyes: Positive for redness (redness of both eyes). Negative for blurred vision.        Patient denies significant visual changes   Cardiovascular: Negative for chest pain, claudication, dyspnea on exertion, irregular heartbeat, leg swelling and palpitations.        Negative except for what's indicated in HPI   Respiratory:  Negative for cough, hemoptysis, shortness of breath and sleep disturbances due to breathing.    Hematologic/Lymphatic: Does not bruise/bleed easily.   Skin: Negative for rash and suspicious lesions.   Musculoskeletal: Positive for arthritis (knees and hands). Negative for back pain, joint pain, joint swelling, muscle cramps and muscle weakness.   Gastrointestinal: Negative for bloating, abdominal pain, constipation, diarrhea, hematochezia and melena.   Genitourinary: Negative for flank pain and hematuria.   Neurological: Positive for loss of balance (walks with cane ). Negative for dizziness, headaches and light-headedness.        No localized deficits   Psychiatric/Behavioral: Negative for depression. The patient does not have insomnia.    Allergic/Immunologic: Negative for environmental allergies.        No new food allergies       PMHx:  Past Medical History:   Diagnosis Date   • A-fib (CMS/HCC)    • Breast cancer (CMS/HCC)    • Carotid artery occlusion    • Congestive cardiac failure (CMS/HCC)    • Dyspnea    • Essential (primary) hypertension    • High cholesterol    • Mitral regurgitation    • Thyroid condition        PSHx:  Past Surgical History:   Procedure Laterality Date   • Breast surgery     • Cardiac catherization  04/23/2021   • Cholecystectomy     • Endarterectomy Right    • Mitral valve repair  05/19/2021    Cecy Clip   • Open coronary endarterectomy     • Removal gallbladder           Allergies:   ALLERGIES:   Allergen Reactions   • Ace Inhibitors ANXIETY and DEPRESSION   • Demerol Other (See Comments)       Medications:  Current Outpatient Medications   Medication Sig Dispense Refill   • metoPROLOL tartrate (LOPRESSOR) 25 MG tablet TAKE ONE TABLET BY MOUTH EVERY 12 HOURS 180 tablet 3   • AMIODarone (PACERONE) 200 MG tablet Take 1 tablet by mouth daily. 30 tablet 0   • apixaBAN (ELIQUIS) 2.5 MG Tab Take 1 tablet by mouth 2 times daily. 180 tablet 3   • cyclobenzaprine (FLEXERIL) 5 MG tablet Take 1  tablet by mouth 3 times daily as needed for Muscle spasms. 20 tablet 0   • docusate sodium 100 MG Cap Take 100 mg by mouth daily.  0   • HYDROcodone-acetaminophen (NORCO)  MG per tablet Take 1 tablet by mouth every 4 hours as needed for Pain. 20 tablet 0   • polyethylene glycol (MIRALAX) 17 g packet Take 17 g by mouth daily. Do not start before May 11, 2022. Stir and dissolve powder in any 4 to 8 ounces of beverage, then drink.  0   • lidocaine (LIDOCARE) 4 % patch Place 1 patch onto the skin daily. Do not start before May 11, 2022. 10 patch 0   • aspirin 81 MG EC tablet Take 1 tablet by mouth daily. Last dose 10/15/21 0815     • acetaminophen (TYLENOL) 500 MG tablet Take 1 tablet by mouth every 6 hours as needed for Pain. 30 tablet    • furosemide (LASIX) 20 MG tablet Take 1 tablet by mouth daily. 90 tablet 3   • amLODIPine (NORVASC) 5 MG tablet Take 1 tablet by mouth daily. 90 tablet 3   • amoxicillin (AMOXIL) 500 MG tablet Take 4 tablets by mouth 1 time as needed (dental prescription). one hour before dental work 4 tablet 0   • albuterol (PROAIR RESPICLICK) 108 (90 Base) MCG/ACT inhaler Inhale 2 puffs into the lungs every 4 hours as needed for Shortness of Breath or Wheezing. 1 Inhaler 12   • levothyroxine 50 MCG tablet Take 50 mcg by mouth daily (before breakfast).      • Multiple Vitamins-Minerals (VITAMIN - THERAPEUTIC MULTIVITAMINS W/MINERALS) tablet Take 1 tablet by mouth daily.      • omeprazole (PRILOSEC) 20 MG capsule Take 20 mg by mouth daily.     • simvastatin (ZOCOR) 20 MG tablet Take 20 mg by mouth nightly.       No current facility-administered medications for this visit.         OBJECTIVE:  Physical Exam:  Vitals: Blood pressure 130/60, pulse 62, resp. rate 18, height 5' 1\" (1.549 m), weight 69.7 kg (153 lb 11.2 oz).  Weight    08/08/22 0933   Weight: 69.7 kg (153 lb 11.2 oz)     General:  No acute distress.  HEENT: Mucosa moist, no scleral icterus  Lungs: Clear to auscultation  Cardiac: JVP  normal. No carotid bruit. RRR, Normal s1, s2, no s3   1 out of 6 systolic murmur.  No significant edema  Abdomen: Soft, non tender, BS+  Musculoskeletal: Gait normal. No tendon xanthoma.   Skin: Warm, no cyanosis.  Neuro: A & O, nonfocal  Psychiatric: Normal affect.    LABORATORY:    Recent Labs    Lab Results   Component Value Date    CHOLESTEROL 213 03/02/2018    CALCLDL 102 03/02/2018    HDL 91 03/02/2018    TRIGLYCERIDE 99 03/02/2018    GPT 45 10/16/2021    GPT 39 05/30/2016    SODIUM 132 (L) 05/11/2022    SODIUM 139 07/24/2020    POTASSIUM 3.7 05/11/2022    POTASSIUM 3.5 07/24/2020    CO2 32 05/11/2022    CO2 31 07/24/2020    BUN 15 05/11/2022    BUN 14 07/24/2020    CREATININE 0.66 05/11/2022    CREATININE 0.75 07/24/2020    TSH 1.810 10/16/2021    TSH 2.080 07/24/2020    NTPROB 449 10/15/2021    NTPROB 153 07/24/2020            DIAGNOSTICS:  The following diagnostics were reviewed:    Echocardiogram May 2022: EF 70%.  Status post mitral clip, mild residual mitral regurgitation.    Right heart cath: RA: 10, PA pressure 30/11, mean of 19, wedge pressure 15, cardiac index 2.7, PVR 1.3, no diastolic pressure gradient, transpulmonary gradient of 4 mmHg.    Dual-chamber pacemaker July 2022: A. fib less than 0.1%.  No VT.  Stable function.      ASSESSMENT:  86 year old female with history of essential hypertension, paroxysmal atrial fibrillation, hypothyroidism, heart failure preserved ejection fraction exacerbation 4/2021, s/p mitral clip 5/2021, stabilized filling pressures and stable cardiac index, maintaining in New York Heart Association class II.  The patient remained stable on the current regimen.  Given her bruising history, we will discontinue aspirin therapy.  She will remain on the low-dose Eliquis.  She will follow-up in 6 months.        PLAN:    1.  Discontinue aspirin.  2.  Continue low-dose Eliquis.  3.  Follow-up in 6 months echo and labs.        Lenin Zafar MD, [facility]  Advanced Heart Failure and  Pulmonary Hypertension  Heart Failure Medical Director [facility]       The Caprini score indicates this patient is at risk for a VTE event (score 3-5).  Most surgical patients in this group would benefit from pharmacologic prophylaxis.  The surgical team will determine the balance between VTE risk and bleeding risk

## 2023-01-27 NOTE — H&P PST ADULT - NSICDXFAMILYHX_GEN_ALL_CORE_FT
FAMILY HISTORY:  Sibling  Still living? Yes, Estimated age: 51-60  FH: type 1 diabetes mellitus, Age at diagnosis: Age Unknown

## 2023-01-27 NOTE — H&P PST ADULT - RESPIRATORY
normal/no wheezes/no rales/no rhonchi normal/clear to auscultation bilaterally/no wheezes/no rales/no rhonchi

## 2023-01-27 NOTE — H&P PST ADULT - REASON FOR ADMISSION
Dev reversal combined with incisional hernia repair on 3/15/22. "For repair of recurrent incisional hernia & abdominal wall reconstruction"

## 2023-01-27 NOTE — H&P PST ADULT - HISTORY OF PRESENT ILLNESS
56 year old male with PMH of HTN , Covid in August of 2021, Status post Dev procedure for perforated diverticulitis in 9/21@ [facility] / discharged home with an extended course of antibiotic and required wound VAC placement which was removed . Pt noted with abdominal bulge, c/o abdominal pain/discomfort noted with abdominal wall hernia . Pt was seen by Dr. Chi Campos and Dr Wagner -  for Dev reversal combined with incisional hernia repair on 3/15/22.    Covid test 3/12/22  Pt will stop phentermine 7 days prior to surgery     pt current on ABT and prednisone for ? sinus infection - requested medical eval prior to surgery        57 year old male with PMH of HTN ,  Status post Dev procedure for perforated diverticulitis in 9/21@ [facility], reversal of Dev's and incisional hernia repair on 3/2022 . Pt c/o abdominal bulge x 6 months- f/u surgical consult- scheduled for recurrent incisional hernia repair, reconstruction of abdominal wall, possible component separation, possible muscle flaps, possible mesh placements/wound vac placement on 2/10/23  **Pt denies any fever, chills, CP/palpitations or sick contacts  **Covid 19 PCR on 2/7/23

## 2023-01-27 NOTE — H&P PST ADULT - NSICDXPASTSURGICALHX_GEN_ALL_CORE_FT
PAST SURGICAL HISTORY:  H/O arthroscopy of knee x2 15 years ago    H/O umbilical hernia repair 20 years ago    History of colon surgery     Status post Dev's procedure

## 2023-02-09 ENCOUNTER — TRANSCRIPTION ENCOUNTER (OUTPATIENT)
Age: 58
End: 2023-02-09

## 2023-02-09 LAB — SARS-COV-2 N GENE NPH QL NAA+PROBE: NOT DETECTED

## 2023-02-10 ENCOUNTER — APPOINTMENT (OUTPATIENT)
Dept: SURGERY | Facility: HOSPITAL | Age: 58
End: 2023-02-10
Payer: COMMERCIAL

## 2023-02-10 ENCOUNTER — TRANSCRIPTION ENCOUNTER (OUTPATIENT)
Age: 58
End: 2023-02-10

## 2023-02-10 ENCOUNTER — APPOINTMENT (OUTPATIENT)
Dept: PLASTIC SURGERY | Facility: HOSPITAL | Age: 58
End: 2023-02-10

## 2023-02-10 ENCOUNTER — INPATIENT (INPATIENT)
Facility: HOSPITAL | Age: 58
LOS: 1 days | Discharge: HOME | DRG: 355 | End: 2023-02-12
Attending: SURGERY | Admitting: SURGERY
Payer: COMMERCIAL

## 2023-02-10 ENCOUNTER — RESULT REVIEW (OUTPATIENT)
Age: 58
End: 2023-02-10

## 2023-02-10 VITALS
WEIGHT: 210.98 LBS | HEART RATE: 80 BPM | OXYGEN SATURATION: 95 % | HEIGHT: 71 IN | SYSTOLIC BLOOD PRESSURE: 115 MMHG | DIASTOLIC BLOOD PRESSURE: 75 MMHG | RESPIRATION RATE: 18 BRPM | TEMPERATURE: 98 F

## 2023-02-10 DIAGNOSIS — Z98.890 OTHER SPECIFIED POSTPROCEDURAL STATES: Chronic | ICD-10-CM

## 2023-02-10 DIAGNOSIS — K43.2 INCISIONAL HERNIA WITHOUT OBSTRUCTION OR GANGRENE: ICD-10-CM

## 2023-02-10 DIAGNOSIS — Z93.3 COLOSTOMY STATUS: Chronic | ICD-10-CM

## 2023-02-10 DIAGNOSIS — R10.9 UNSPECIFIED ABDOMINAL PAIN: ICD-10-CM

## 2023-02-10 LAB — GLUCOSE BLDC GLUCOMTR-MCNC: 178 MG/DL — HIGH (ref 70–99)

## 2023-02-10 PROCEDURE — 15734 MUSCLE-SKIN GRAFT TRUNK: CPT

## 2023-02-10 PROCEDURE — 88304 TISSUE EXAM BY PATHOLOGIST: CPT | Mod: 26

## 2023-02-10 PROCEDURE — 14301 TIS TRNFR ANY 30.1-60 SQ CM: CPT

## 2023-02-10 PROCEDURE — 49617 RPR AA HRN RCR > 10 RDC: CPT | Mod: 82

## 2023-02-10 PROCEDURE — 49617 RPR AA HRN RCR > 10 RDC: CPT

## 2023-02-10 PROCEDURE — 14302 TIS TRNFR ADDL 30 SQ CM: CPT

## 2023-02-10 DEVICE — MESH HERNIA VENTRIO ST OVAL 3X4.7IN SM: Type: IMPLANTABLE DEVICE | Status: FUNCTIONAL

## 2023-02-10 DEVICE — ETHICON HERNIA SECURESTRAP 5MM SIZE 25: Type: IMPLANTABLE DEVICE | Status: FUNCTIONAL

## 2023-02-10 RX ORDER — OXYCODONE HYDROCHLORIDE 5 MG/1
10 TABLET ORAL EVERY 4 HOURS
Refills: 0 | Status: DISCONTINUED | OUTPATIENT
Start: 2023-02-10 | End: 2023-02-12

## 2023-02-10 RX ORDER — SODIUM CHLORIDE 9 MG/ML
1000 INJECTION, SOLUTION INTRAVENOUS
Refills: 0 | Status: DISCONTINUED | OUTPATIENT
Start: 2023-02-10 | End: 2023-02-11

## 2023-02-10 RX ORDER — CEFAZOLIN SODIUM 1 G
2000 VIAL (EA) INJECTION ONCE
Refills: 0 | Status: COMPLETED | OUTPATIENT
Start: 2023-02-10 | End: 2023-02-10

## 2023-02-10 RX ORDER — SEMAGLUTIDE 0.68 MG/ML
1 INJECTION, SOLUTION SUBCUTANEOUS
Qty: 0 | Refills: 0 | DISCHARGE

## 2023-02-10 RX ORDER — DIAZEPAM 5 MG
5 TABLET ORAL EVERY 6 HOURS
Refills: 0 | Status: DISCONTINUED | OUTPATIENT
Start: 2023-02-10 | End: 2023-02-10

## 2023-02-10 RX ORDER — OXYCODONE HYDROCHLORIDE 5 MG/1
5 TABLET ORAL EVERY 4 HOURS
Refills: 0 | Status: DISCONTINUED | OUTPATIENT
Start: 2023-02-10 | End: 2023-02-12

## 2023-02-10 RX ORDER — SENNA PLUS 8.6 MG/1
2 TABLET ORAL AT BEDTIME
Refills: 0 | Status: DISCONTINUED | OUTPATIENT
Start: 2023-02-10 | End: 2023-02-12

## 2023-02-10 RX ORDER — ACETAMINOPHEN 500 MG
1000 TABLET ORAL EVERY 6 HOURS
Refills: 0 | Status: COMPLETED | OUTPATIENT
Start: 2023-02-10 | End: 2023-02-11

## 2023-02-10 RX ORDER — LIDOCAINE HCL 20 MG/ML
0.2 VIAL (ML) INJECTION ONCE
Refills: 0 | Status: DISCONTINUED | OUTPATIENT
Start: 2023-02-10 | End: 2023-02-10

## 2023-02-10 RX ORDER — LISINOPRIL 2.5 MG/1
1 TABLET ORAL
Qty: 0 | Refills: 0 | DISCHARGE

## 2023-02-10 RX ORDER — OXYCODONE HYDROCHLORIDE 5 MG/1
5 TABLET ORAL EVERY 6 HOURS
Refills: 0 | Status: DISCONTINUED | OUTPATIENT
Start: 2023-02-10 | End: 2023-02-10

## 2023-02-10 RX ORDER — GABAPENTIN 400 MG/1
600 CAPSULE ORAL ONCE
Refills: 0 | Status: COMPLETED | OUTPATIENT
Start: 2023-02-10 | End: 2023-02-10

## 2023-02-10 RX ORDER — CELECOXIB 200 MG/1
400 CAPSULE ORAL ONCE
Refills: 0 | Status: COMPLETED | OUTPATIENT
Start: 2023-02-10 | End: 2023-02-10

## 2023-02-10 RX ORDER — HEPARIN SODIUM 5000 [USP'U]/ML
5000 INJECTION INTRAVENOUS; SUBCUTANEOUS EVERY 8 HOURS
Refills: 0 | Status: DISCONTINUED | OUTPATIENT
Start: 2023-02-10 | End: 2023-02-12

## 2023-02-10 RX ORDER — ACETAMINOPHEN 500 MG
1 TABLET ORAL
Qty: 0 | Refills: 0 | DISCHARGE

## 2023-02-10 RX ORDER — OXYCODONE HYDROCHLORIDE 5 MG/1
10 TABLET ORAL EVERY 6 HOURS
Refills: 0 | Status: DISCONTINUED | OUTPATIENT
Start: 2023-02-10 | End: 2023-02-10

## 2023-02-10 RX ORDER — MORPHINE SULFATE 50 MG/1
0.25 CAPSULE, EXTENDED RELEASE ORAL ONCE
Refills: 0 | Status: DISCONTINUED | OUTPATIENT
Start: 2023-02-10 | End: 2023-02-11

## 2023-02-10 RX ORDER — ONDANSETRON 8 MG/1
4 TABLET, FILM COATED ORAL ONCE
Refills: 0 | Status: DISCONTINUED | OUTPATIENT
Start: 2023-02-10 | End: 2023-02-10

## 2023-02-10 RX ORDER — ONDANSETRON 8 MG/1
4 TABLET, FILM COATED ORAL EVERY 8 HOURS
Refills: 0 | Status: DISCONTINUED | OUTPATIENT
Start: 2023-02-10 | End: 2023-02-12

## 2023-02-10 RX ORDER — ASCORBIC ACID 60 MG
1 TABLET,CHEWABLE ORAL
Qty: 0 | Refills: 0 | DISCHARGE

## 2023-02-10 RX ORDER — CHLORHEXIDINE GLUCONATE 213 G/1000ML
1 SOLUTION TOPICAL ONCE
Refills: 0 | Status: DISCONTINUED | OUTPATIENT
Start: 2023-02-10 | End: 2023-02-10

## 2023-02-10 RX ORDER — POLYETHYLENE GLYCOL 3350 17 G/17G
17 POWDER, FOR SOLUTION ORAL DAILY
Refills: 0 | Status: DISCONTINUED | OUTPATIENT
Start: 2023-02-10 | End: 2023-02-12

## 2023-02-10 RX ORDER — LISINOPRIL 2.5 MG/1
10 TABLET ORAL DAILY
Refills: 0 | Status: DISCONTINUED | OUTPATIENT
Start: 2023-02-11 | End: 2023-02-12

## 2023-02-10 RX ORDER — SODIUM CHLORIDE 9 MG/ML
3 INJECTION INTRAMUSCULAR; INTRAVENOUS; SUBCUTANEOUS EVERY 8 HOURS
Refills: 0 | Status: DISCONTINUED | OUTPATIENT
Start: 2023-02-10 | End: 2023-02-10

## 2023-02-10 RX ORDER — AMLODIPINE BESYLATE 2.5 MG/1
5 TABLET ORAL DAILY
Refills: 0 | Status: DISCONTINUED | OUTPATIENT
Start: 2023-02-11 | End: 2023-02-12

## 2023-02-10 RX ORDER — AMLODIPINE BESYLATE 2.5 MG/1
1 TABLET ORAL
Qty: 0 | Refills: 0 | DISCHARGE

## 2023-02-10 RX ORDER — KETOROLAC TROMETHAMINE 30 MG/ML
30 SYRINGE (ML) INJECTION EVERY 6 HOURS
Refills: 0 | Status: DISCONTINUED | OUTPATIENT
Start: 2023-02-10 | End: 2023-02-11

## 2023-02-10 RX ADMIN — OXYCODONE HYDROCHLORIDE 5 MILLIGRAM(S): 5 TABLET ORAL at 17:12

## 2023-02-10 RX ADMIN — Medication 400 MILLIGRAM(S): at 14:40

## 2023-02-10 RX ADMIN — HEPARIN SODIUM 5000 UNIT(S): 5000 INJECTION INTRAVENOUS; SUBCUTANEOUS at 21:07

## 2023-02-10 RX ADMIN — Medication 30 MILLIGRAM(S): at 17:50

## 2023-02-10 RX ADMIN — Medication 1000 MILLIGRAM(S): at 21:37

## 2023-02-10 RX ADMIN — SENNA PLUS 2 TABLET(S): 8.6 TABLET ORAL at 21:07

## 2023-02-10 RX ADMIN — OXYCODONE HYDROCHLORIDE 10 MILLIGRAM(S): 5 TABLET ORAL at 21:37

## 2023-02-10 RX ADMIN — CELECOXIB 400 MILLIGRAM(S): 200 CAPSULE ORAL at 06:57

## 2023-02-10 RX ADMIN — GABAPENTIN 600 MILLIGRAM(S): 400 CAPSULE ORAL at 06:57

## 2023-02-10 RX ADMIN — SODIUM CHLORIDE 75 MILLILITER(S): 9 INJECTION, SOLUTION INTRAVENOUS at 12:35

## 2023-02-10 RX ADMIN — OXYCODONE HYDROCHLORIDE 10 MILLIGRAM(S): 5 TABLET ORAL at 12:23

## 2023-02-10 RX ADMIN — Medication 1000 MILLIGRAM(S): at 15:00

## 2023-02-10 RX ADMIN — OXYCODONE HYDROCHLORIDE 5 MILLIGRAM(S): 5 TABLET ORAL at 17:50

## 2023-02-10 RX ADMIN — HEPARIN SODIUM 5000 UNIT(S): 5000 INJECTION INTRAVENOUS; SUBCUTANEOUS at 14:40

## 2023-02-10 RX ADMIN — Medication 30 MILLIGRAM(S): at 17:19

## 2023-02-10 RX ADMIN — OXYCODONE HYDROCHLORIDE 10 MILLIGRAM(S): 5 TABLET ORAL at 12:08

## 2023-02-10 RX ADMIN — OXYCODONE HYDROCHLORIDE 10 MILLIGRAM(S): 5 TABLET ORAL at 21:07

## 2023-02-10 RX ADMIN — Medication 400 MILLIGRAM(S): at 21:07

## 2023-02-10 NOTE — BRIEF OPERATIVE NOTE - OPERATION/FINDINGS
Incisional hernia repair (see General Surgery brief op note). Abdominal wall reconstruction with mesh, creation of local tissue flaps.

## 2023-02-10 NOTE — PRE-OP CHECKLIST - IDENTIFICATION BAND VERIFIED
subjective-patient time p.o., minimal pain, minimal bleeding, coping well  Objective vitals stable  Chest-clear to auscultation  Abdomen soft, nontender, uterus firm at umbilicus  Pelvic-positive lochia  Extremities-no calf tenderness or edema  Assessment postpartum day #1 hemoglobin 10  Plan discharge home follow-up in 6 weeks.  Discharge instructions are given           Electronically Signed On 02.06.2019 07:55  ___________________________________________________   Mark Pruitt MD    
done

## 2023-02-10 NOTE — PRE-ANESTHESIA EVALUATION ADULT - NSANTHPMHFT_GEN_ALL_CORE
chart and consultant notes reviewed. no hx sig cv/pulm dz - states et > 1 flight, no cp/sob. dm well cxontrolled

## 2023-02-10 NOTE — BRIEF OPERATIVE NOTE - NSICDXBRIEFPROCEDURE_GEN_ALL_CORE_FT
PROCEDURES:  Open repair of recurrent incisional hernia of anterior abdominal wall using synthetic mesh and anterior component separation technique 10-Feb-2023 10:42:12  Davin Gordillo

## 2023-02-10 NOTE — CHART NOTE - NSCHARTNOTEFT_GEN_A_CORE
Surgery Post op Note    Procedure: Open recurrent incisional hernia repair with mesh, anterior component separation technique and plastics closure    Subjective: Patient seen and evaluated at the bedside. Complaining of some abdominal pain over incision, requesting oxycodone. Not reporting nausea/vomiting. Tried drinking water which he tolerated. Has been passing gas since surgery.     Objective:   Vital Signs Last 24 Hrs  T(C): 36.2 (10 Feb 2023 11:21), Max: 36.6 (10 Feb 2023 06:39)  T(F): 97.2 (10 Feb 2023 11:21), Max: 97.9 (10 Feb 2023 06:39)  HR: 85 (10 Feb 2023 14:30) (67 - 85)  BP: 98/58 (10 Feb 2023 14:30) (91/60 - 115/75)  BP(mean): 73 (10 Feb 2023 14:30) (69 - 85)  RR: 16 (10 Feb 2023 15:00) (16 - 18)  SpO2: 96% (10 Feb 2023 14:30) (93% - 96%)    Parameters below as of 10 Feb 2023 15:00  Patient On (Oxygen Delivery Method): nasal cannula  O2 Flow (L/min): 2    I&O's Summary    10 Feb 2023 07:01  -  10 Feb 2023 15:58  --------------------------------------------------------  IN: 425 mL / OUT: 0 mL / NET: 425 mL      I&O's Detail    10 Feb 2023 07:01  -  10 Feb 2023 15:58  --------------------------------------------------------  IN:    Lactated Ringers: 225 mL    Oral Fluid: 200 mL  Total IN: 425 mL    OUT:  Total OUT: 0 mL    Total NET: 425 mL        Labs:        MEDICATIONS  (STANDING):  acetaminophen   IVPB .. 1000 milliGRAM(s) IV Intermittent every 6 hours  heparin   Injectable 5000 Unit(s) SubCutaneous every 8 hours  ketorolac   Injectable 30 milliGRAM(s) IV Push every 6 hours  lactated ringers. 1000 milliLiter(s) (75 mL/Hr) IV Continuous <Continuous>  morphine PF Spinal 0.25 milliGRAM(s) IntraThecal. once  polyethylene glycol 3350 17 Gram(s) Oral daily  senna 2 Tablet(s) Oral at bedtime    MEDICATIONS  (PRN):  diazepam  Injectable 5 milliGRAM(s) IV Push every 6 hours PRN Muscle Spasms  ondansetron Injectable 4 milliGRAM(s) IV Push once PRN Nausea and/or Vomiting  ondansetron Injectable 4 milliGRAM(s) IV Push every 8 hours PRN Nausea and/or Vomiting  oxyCODONE    IR 5 milliGRAM(s) Oral every 4 hours PRN Moderate Pain (4 - 6)  oxyCODONE    IR 10 milliGRAM(s) Oral every 4 hours PRN Severe Pain (7 - 10)        Physical Exam:  General: well developed, well nourished, NAD  HEENT: ncat, trachea midline  Respiratory: respirations non labored  Gastrointestinal: soft, nondistended, tender over incision, abd binder in place, 2 ERASTO bulbs with serosanguinous output  Extremities: FROM, warm  Neurological: non-focal    Assessment/Plan: 57y Male s/p open recurrent incisional hernia repair with mesh and plastics closure 2/10  - ERP  - Diet: CLD  - Activity - OOB with assistance  - Labs: am labs  - monitor vital signs  - Pain medication as needed   - DVT ppx  - Incentive spirometry   - ERASTO drain care  - keep abdominal binder on  - f/u plastics recs    [facility], [id number]

## 2023-02-10 NOTE — PATIENT PROFILE ADULT - FALL HARM RISK - UNIVERSAL INTERVENTIONS
Bed in lowest position, wheels locked, appropriate side rails in place/Call bell, personal items and telephone in reach/Instruct patient to call for assistance before getting out of bed or chair/Non-slip footwear when patient is out of bed/[place] to call system/Physically safe environment - no spills, clutter or unnecessary equipment/Purposeful Proactive Rounding/Room/bathroom lighting operational, light cord in reach

## 2023-02-10 NOTE — BRIEF OPERATIVE NOTE - OPERATION/FINDINGS
s/p repair of recurrent incisional hernia with ST Ventrio Patch 8x12cm    Midline incision over scar performed and adhesions taken down sharply along the posterior abdominal wall. Prior ovitex mesh was encountered and kept in the abdominal wall as it was well incorporated. Total defect measured greater than 10cm with a small inferior defect and a superior defect. ST Ventrio PAtch placed underneath the superior defect and secured. Please refer to plastic surgery op note for further details. s/p repair of recurrent incisional hernia with ST Ventrio Patch 8x12cm.Tthe rec incisional hernia size was >10cm    Midline incision over scar performed and adhesions taken down sharply along the posterior abdominal wall. Prior ovitex mesh was encountered and kept in the abdominal wall as it was well incorporated. Total defect measured greater than 10cm with a small inferior defect and a superior defect. ST Ventrio PAtch placed underneath the superior defect and secured. Please refer to plastic surgery op note for further details.

## 2023-02-11 ENCOUNTER — TRANSCRIPTION ENCOUNTER (OUTPATIENT)
Age: 58
End: 2023-02-11

## 2023-02-11 LAB
ANION GAP SERPL CALC-SCNC: 11 MMOL/L — SIGNIFICANT CHANGE UP (ref 5–17)
BUN SERPL-MCNC: 16 MG/DL — SIGNIFICANT CHANGE UP (ref 7–23)
CALCIUM SERPL-MCNC: 8.8 MG/DL — SIGNIFICANT CHANGE UP (ref 8.4–10.5)
CHLORIDE SERPL-SCNC: 99 MMOL/L — SIGNIFICANT CHANGE UP (ref 96–108)
CO2 SERPL-SCNC: 24 MMOL/L — SIGNIFICANT CHANGE UP (ref 22–31)
CREAT SERPL-MCNC: 0.77 MG/DL — SIGNIFICANT CHANGE UP (ref 0.5–1.3)
EGFR: 104 ML/MIN/1.73M2 — SIGNIFICANT CHANGE UP
GLUCOSE SERPL-MCNC: 136 MG/DL — HIGH (ref 70–99)
HCT VFR BLD CALC: 35.3 % — LOW (ref 39–50)
HGB BLD-MCNC: 11.6 G/DL — LOW (ref 13–17)
MAGNESIUM SERPL-MCNC: 2.2 MG/DL — SIGNIFICANT CHANGE UP (ref 1.6–2.6)
MCHC RBC-ENTMCNC: 30.6 PG — SIGNIFICANT CHANGE UP (ref 27–34)
MCHC RBC-ENTMCNC: 32.9 GM/DL — SIGNIFICANT CHANGE UP (ref 32–36)
MCV RBC AUTO: 93.1 FL — SIGNIFICANT CHANGE UP (ref 80–100)
NRBC # BLD: 0 /100 WBCS — SIGNIFICANT CHANGE UP (ref 0–0)
PHOSPHATE SERPL-MCNC: 3.8 MG/DL — SIGNIFICANT CHANGE UP (ref 2.5–4.5)
PLATELET # BLD AUTO: 190 K/UL — SIGNIFICANT CHANGE UP (ref 150–400)
POTASSIUM SERPL-MCNC: 4.1 MMOL/L — SIGNIFICANT CHANGE UP (ref 3.5–5.3)
POTASSIUM SERPL-SCNC: 4.1 MMOL/L — SIGNIFICANT CHANGE UP (ref 3.5–5.3)
RBC # BLD: 3.79 M/UL — LOW (ref 4.2–5.8)
RBC # FLD: 12.5 % — SIGNIFICANT CHANGE UP (ref 10.3–14.5)
SODIUM SERPL-SCNC: 134 MMOL/L — LOW (ref 135–145)
WBC # BLD: 8.03 K/UL — SIGNIFICANT CHANGE UP (ref 3.8–10.5)
WBC # FLD AUTO: 8.03 K/UL — SIGNIFICANT CHANGE UP (ref 3.8–10.5)

## 2023-02-11 RX ORDER — OXYCODONE HYDROCHLORIDE 5 MG/1
1 TABLET ORAL
Qty: 15 | Refills: 0
Start: 2023-02-11

## 2023-02-11 RX ORDER — ACETAMINOPHEN 500 MG
1000 TABLET ORAL EVERY 6 HOURS
Refills: 0 | Status: DISCONTINUED | OUTPATIENT
Start: 2023-02-11 | End: 2023-02-12

## 2023-02-11 RX ORDER — IBUPROFEN 200 MG
600 TABLET ORAL EVERY 6 HOURS
Refills: 0 | Status: DISCONTINUED | OUTPATIENT
Start: 2023-02-11 | End: 2023-02-12

## 2023-02-11 RX ORDER — IBUPROFEN 200 MG
1 TABLET ORAL
Qty: 0 | Refills: 0 | DISCHARGE
Start: 2023-02-11

## 2023-02-11 RX ADMIN — HEPARIN SODIUM 5000 UNIT(S): 5000 INJECTION INTRAVENOUS; SUBCUTANEOUS at 13:12

## 2023-02-11 RX ADMIN — OXYCODONE HYDROCHLORIDE 10 MILLIGRAM(S): 5 TABLET ORAL at 11:04

## 2023-02-11 RX ADMIN — Medication 600 MILLIGRAM(S): at 13:45

## 2023-02-11 RX ADMIN — OXYCODONE HYDROCHLORIDE 10 MILLIGRAM(S): 5 TABLET ORAL at 02:02

## 2023-02-11 RX ADMIN — Medication 1000 MILLIGRAM(S): at 06:02

## 2023-02-11 RX ADMIN — OXYCODONE HYDROCHLORIDE 10 MILLIGRAM(S): 5 TABLET ORAL at 20:28

## 2023-02-11 RX ADMIN — Medication 30 MILLIGRAM(S): at 00:50

## 2023-02-11 RX ADMIN — Medication 1000 MILLIGRAM(S): at 11:35

## 2023-02-11 RX ADMIN — POLYETHYLENE GLYCOL 3350 17 GRAM(S): 17 POWDER, FOR SOLUTION ORAL at 13:11

## 2023-02-11 RX ADMIN — Medication 400 MILLIGRAM(S): at 11:05

## 2023-02-11 RX ADMIN — OXYCODONE HYDROCHLORIDE 10 MILLIGRAM(S): 5 TABLET ORAL at 19:58

## 2023-02-11 RX ADMIN — Medication 600 MILLIGRAM(S): at 21:45

## 2023-02-11 RX ADMIN — Medication 1000 MILLIGRAM(S): at 17:59

## 2023-02-11 RX ADMIN — Medication 600 MILLIGRAM(S): at 13:12

## 2023-02-11 RX ADMIN — HEPARIN SODIUM 5000 UNIT(S): 5000 INJECTION INTRAVENOUS; SUBCUTANEOUS at 21:44

## 2023-02-11 RX ADMIN — OXYCODONE HYDROCHLORIDE 10 MILLIGRAM(S): 5 TABLET ORAL at 15:17

## 2023-02-11 RX ADMIN — Medication 30 MILLIGRAM(S): at 06:47

## 2023-02-11 RX ADMIN — Medication 600 MILLIGRAM(S): at 22:15

## 2023-02-11 RX ADMIN — OXYCODONE HYDROCHLORIDE 10 MILLIGRAM(S): 5 TABLET ORAL at 01:32

## 2023-02-11 RX ADMIN — Medication 1000 MILLIGRAM(S): at 17:06

## 2023-02-11 RX ADMIN — OXYCODONE HYDROCHLORIDE 10 MILLIGRAM(S): 5 TABLET ORAL at 11:35

## 2023-02-11 RX ADMIN — SENNA PLUS 2 TABLET(S): 8.6 TABLET ORAL at 21:44

## 2023-02-11 RX ADMIN — Medication 400 MILLIGRAM(S): at 05:32

## 2023-02-11 RX ADMIN — Medication 30 MILLIGRAM(S): at 07:20

## 2023-02-11 RX ADMIN — Medication 30 MILLIGRAM(S): at 01:20

## 2023-02-11 RX ADMIN — HEPARIN SODIUM 5000 UNIT(S): 5000 INJECTION INTRAVENOUS; SUBCUTANEOUS at 05:32

## 2023-02-11 RX ADMIN — OXYCODONE HYDROCHLORIDE 10 MILLIGRAM(S): 5 TABLET ORAL at 07:20

## 2023-02-11 RX ADMIN — OXYCODONE HYDROCHLORIDE 10 MILLIGRAM(S): 5 TABLET ORAL at 15:50

## 2023-02-11 RX ADMIN — OXYCODONE HYDROCHLORIDE 10 MILLIGRAM(S): 5 TABLET ORAL at 06:46

## 2023-02-11 NOTE — DISCHARGE NOTE PROVIDER - PROVIDER TOKENS
PROVIDER:[TOKEN:[[id number]],FOLLOWUP:[2 weeks]],PROVIDER:[TOKEN:[[id number]],FOLLOWUP:[1 week]] Fall with Harm Risk

## 2023-02-11 NOTE — DISCHARGE NOTE PROVIDER - NSDCFUSCHEDAPPT_GEN_ALL_CORE_FT
[facility] Physician [facility]  [id number]  Scheduled Appointment: 02/16/2023

## 2023-02-11 NOTE — DISCHARGE NOTE PROVIDER - CARE PROVIDERS DIRECT ADDRESSES
[email or web address]

## 2023-02-11 NOTE — PHYSICAL THERAPY INITIAL EVALUATION ADULT - CRITERIA FOR SKILLED THERAPEUTIC INTERVENTIONS
Pt is I with functional mobility. Pt to be taken off PT program, however pt will remain on amb aide to maintain strength and functional independence.

## 2023-02-11 NOTE — DISCHARGE NOTE PROVIDER - CARE PROVIDER_API CALL
Mann Wagner)  Surgery  [address], [address]  [place] [address]  Phone: [phone]  Fax: [phone]  Follow Up Time: 2 weeks    Morales Bell)  ColonRectal Surgery; Plastic Surgery; Surgery; Surgery of the Hand  [address], [address]  [place] [address]  Phone: [phone]  Fax: [phone]  Follow Up Time: 1 week

## 2023-02-11 NOTE — PHYSICAL THERAPY INITIAL EVALUATION ADULT - DID THE PATIENT HAVE SURGERY?
Open recurrent incisional hernia repair with mesh, anterior component separation technique and plastics closure/yes

## 2023-02-11 NOTE — DISCHARGE NOTE PROVIDER - NSDCFUADDINST_GEN_ALL_CORE_FT
Please keep abdominal binder in place until follow up with plastic surgery Please keep abdominal binder in place and keep incision dry until follow up with plastic surgery

## 2023-02-11 NOTE — PROGRESS NOTE ADULT - SUBJECTIVE AND OBJECTIVE BOX
Plastic Surgery Progress Note    SUBJECTIVE: Pt seen and examined at bedside. Patient comfortable and in no-apparent distress.     Vital Signs Last 24 Hrs  T(C): 36.4 (11 Feb 2023 05:30), Max: 36.8 (10 Feb 2023 17:00)  T(F): 97.6 (11 Feb 2023 05:30), Max: 98.2 (10 Feb 2023 17:00)  HR: 66 (11 Feb 2023 05:30) (54 - 90)  BP: 100/62 (11 Feb 2023 05:30) (91/60 - 132/78)  BP(mean): 78 (10 Feb 2023 16:30) (69 - 85)  RR: 18 (11 Feb 2023 05:30) (16 - 18)  SpO2: 92% (11 Feb 2023 05:30) (92% - 99%)    Parameters below as of 11 Feb 2023 05:30  Patient On (Oxygen Delivery Method): room air        Physical Exam:  General Appearance: Appears well, NAD  Respiratory: No labored breathing  CV: Pulse regularly present  Abdomen: Soft, nontender, incision c/d/i, ERASTO drains with SS output     LABS:                        11.6   8.03  )-----------( 190      ( 11 Feb 2023 07:09 )             35.3     02-11    134<L>  |  99  |  16  ----------------------------<  136<H>  4.1   |  24  |  0.77    Ca    8.8      11 Feb 2023 07:08  Phos  3.8     02-11  Mg     2.2     02-11            INs and OUTs:    02-10-23 @ 07:01  -  02-11-23 @ 07:00  --------------------------------------------------------  IN: 1800 mL / OUT: 1080 mL / NET: 720 mL

## 2023-02-11 NOTE — PROGRESS NOTE ADULT - SUBJECTIVE AND OBJECTIVE BOX
Overnight events:   - No acute events    SUBJECTIVE:  Patient was seen and examined on AM rounds.    OBJECTIVE:  Vital Signs Last 24 Hrs  T(C): 36.7 (11 Feb 2023 00:25), Max: 36.8 (10 Feb 2023 17:00)  T(F): 98 (11 Feb 2023 00:25), Max: 98.2 (10 Feb 2023 17:00)  HR: 82 (11 Feb 2023 00:25) (54 - 90)  BP: 132/78 (11 Feb 2023 00:25) (91/60 - 132/78)  BP(mean): 78 (10 Feb 2023 16:30) (69 - 85)  RR: 18 (11 Feb 2023 00:25) (16 - 18)  SpO2: 96% (11 Feb 2023 00:25) (93% - 99%)    Parameters below as of 11 Feb 2023 00:25  Patient On (Oxygen Delivery Method): room air        02-10-23 @ 07:01  -  02-11-23 @ 00:45  --------------------------------------------------------  IN: 800 mL / OUT: 630 mL / NET: 170 mL      Physical Examination:  General: well developed, well nourished, NAD  HEENT: ncat, trachea midline  Respiratory: respirations non labored  Gastrointestinal: soft, nondistended, tender over incision, abd binder in place, 2 ERASTO bulbs with serosanguinous output  Extremities: FROM, warm  Neurological: non-focal        LABS:               Overnight events:   - Passed TOV    SUBJECTIVE:  Patient was seen and examined on AM rounds.    OBJECTIVE:  Vital Signs Last 24 Hrs  T(C): 36.7 (11 Feb 2023 00:25), Max: 36.8 (10 Feb 2023 17:00)  T(F): 98 (11 Feb 2023 00:25), Max: 98.2 (10 Feb 2023 17:00)  HR: 82 (11 Feb 2023 00:25) (54 - 90)  BP: 132/78 (11 Feb 2023 00:25) (91/60 - 132/78)  BP(mean): 78 (10 Feb 2023 16:30) (69 - 85)  RR: 18 (11 Feb 2023 00:25) (16 - 18)  SpO2: 96% (11 Feb 2023 00:25) (93% - 99%)    Parameters below as of 11 Feb 2023 00:25  Patient On (Oxygen Delivery Method): room air        02-10-23 @ 07:01  -  02-11-23 @ 00:45  --------------------------------------------------------  IN: 800 mL / OUT: 630 mL / NET: 170 mL      Physical Examination:  General: NAD, resting in bed  Respiratory: respirations non labored  Gastrointestinal: soft, nondistended, appropriately tender, abd binder in place, 2 JPs with serosanguinous output  Extremities: FROM, warm        LABS:               Overnight events:   - Passed TOV    SUBJECTIVE:  Patient was seen and examined on AM rounds. Pain is controlled. Patient reports some urinary incontinence overnight. Tolerated clears.    OBJECTIVE:  Vital Signs Last 24 Hrs  T(C): 36.7 (11 Feb 2023 00:25), Max: 36.8 (10 Feb 2023 17:00)  T(F): 98 (11 Feb 2023 00:25), Max: 98.2 (10 Feb 2023 17:00)  HR: 82 (11 Feb 2023 00:25) (54 - 90)  BP: 132/78 (11 Feb 2023 00:25) (91/60 - 132/78)  BP(mean): 78 (10 Feb 2023 16:30) (69 - 85)  RR: 18 (11 Feb 2023 00:25) (16 - 18)  SpO2: 96% (11 Feb 2023 00:25) (93% - 99%)    Parameters below as of 11 Feb 2023 00:25  Patient On (Oxygen Delivery Method): room air        02-10-23 @ 07:01  -  02-11-23 @ 00:45  --------------------------------------------------------  IN: 800 mL / OUT: 630 mL / NET: 170 mL      Physical Examination:  General: NAD, resting in bed  Respiratory: respirations non labored  Gastrointestinal: soft, nondistended, appropriately tender, dermabond/prineo over midline incision, abd binder in place, 2 JPs with serosanguinous output  Extremities: FROM, warm        LABS:

## 2023-02-11 NOTE — DISCHARGE NOTE PROVIDER - NSDCMRMEDTOKEN_GEN_ALL_CORE_FT
amLODIPine 5 mg oral tablet: 1 tab(s) orally once a day  ibuprofen 600 mg oral tablet: 1 tab(s) orally every 6 hours  lisinopril 10 mg oral tablet: 1 tab(s) orally once a day  Multiple Vitamins oral tablet: 1 tab(s) orally once a day  Ozempic 2 mg/1.5 mL (1 mg dose) subcutaneous solution: 1  subcutaneous once a week, on Mondays  Tylenol 325 mg oral tablet: 1 tab(s) orally every 8 hours, As Needed  Vitamin C 1000 mg oral tablet: 1 tab(s) orally once a day   amLODIPine 5 mg oral tablet: 1 tab(s) orally once a day  ibuprofen 600 mg oral tablet: 1 tab(s) orally every 6 hours  lisinopril 10 mg oral tablet: 1 tab(s) orally once a day  Multiple Vitamins oral tablet: 1 tab(s) orally once a day  oxyCODONE 5 mg oral tablet: 1 tab(s) orally every 6 hours, As Needed for breakthrough pain MDD:4  Ozempic 2 mg/1.5 mL (1 mg dose) subcutaneous solution: 1  subcutaneous once a week, on Mondays  Tylenol 325 mg oral tablet: 1 tab(s) orally every 8 hours, As Needed  Vitamin C 1000 mg oral tablet: 1 tab(s) orally once a day   amLODIPine 5 mg oral tablet: 1 tab(s) orally once a day  cefadroxil 500 mg oral capsule: 1 cap(s) orally 2 times a day   ibuprofen 600 mg oral tablet: 1 tab(s) orally every 6 hours  lisinopril 10 mg oral tablet: 1 tab(s) orally once a day  Multiple Vitamins oral tablet: 1 tab(s) orally once a day  oxyCODONE 5 mg oral tablet: 1 tab(s) orally every 6 hours, As Needed for breakthrough pain MDD:4  Ozempic 2 mg/1.5 mL (1 mg dose) subcutaneous solution: 1  subcutaneous once a week, on Mondays  Tylenol 325 mg oral tablet: 1 tab(s) orally every 8 hours, As Needed  Vitamin C 1000 mg oral tablet: 1 tab(s) orally once a day

## 2023-02-11 NOTE — DISCHARGE NOTE PROVIDER - HOSPITAL COURSE
57y Male w/ PMH of HTN, perforated diverticulitis s/p Dev's 9/2021 and reversal w/ incisional hernia repair 3/2022, now presented 2/10 for scheduled recurrent incisional hernia repair.    Patient underwent open recurrent incisional hernia repair with ventrio ST mesh and anterior component separation w/ PRS 2/10. The patient tolerated the procedure well (see operative report for full details). He received intrathecal morphine for pain control. He was placed on IV tylenol and toradol for 24 hours. The patient was given clear liquids with ensure clears in PACU and encouraged with early ambulation. Tello was removed on POD #0 with subsequent successful trial of void. Once IV pain control dosing complete, he was transitioned to oral tylenol and motrin with oxycodone for breakthrough pain. Once he tolerated 600cc of clear liquids, his diet was advanced to low fiber. PT evaluated him and recommended no skilled needs. DVT ppx with SCDs and Lovenox administered daily. Ambulation and incentive spirometry encouraged. On day of discharge, the patient was tolerating diet, ambulating well and pain controlled. He will follow up with Dr. Wagner in 2 weeks and plastics, Dr. Bell in 1 week.   57y Male w/ PMH of HTN, perforated diverticulitis s/p Dev's 9/2021 and reversal w/ incisional hernia repair 3/2022, now presented 2/10 for scheduled recurrent incisional hernia repair.    Patient underwent open recurrent incisional hernia repair with ventrio ST mesh and anterior component separation w/ PRS 2/10. The patient tolerated the procedure well (see operative report for full details). He received intrathecal morphine for pain control. He was placed on IV tylenol and toradol for 24 hours. The patient was given clear liquids with ensure clears in PACU and encouraged with early ambulation. Tello was removed on POD #0 with subsequent successful trial of void. Once IV pain control dosing complete, he was transitioned to oral tylenol and motrin with oxycodone for breakthrough pain. Once he tolerated 600cc of clear liquids, his diet was advanced to low fiber. PT evaluated him and recommended no skilled needs. DVT ppx with SCDs and Lovenox administered daily. Ambulation and incentive spirometry encouraged.     On day of discharge, POD2, the patient was tolerating diet, ambulating well and pain controlled. He will follow up with Dr. Wagner in 2 weeks and plastics, Dr. Bell in 1 week and will be discharged on 1 week of abx.

## 2023-02-11 NOTE — PHYSICAL THERAPY INITIAL EVALUATION ADULT - PERTINENT HX OF CURRENT PROBLEM, REHAB EVAL
58 yo M s/p Open recurrent incisional hernia repair with mesh, anterior component separation technique and plastics closure 2/10.

## 2023-02-11 NOTE — PROGRESS NOTE ADULT - ASSESSMENT
57y Male s/p open recurrent incisional hernia repair with mesh and plastics closure 2/10    Recs:  - ERP  - Diet: CLD  - Activity - OOB with assistance  - Labs: am labs  - monitor vital signs  - Pain medication as needed   - DVT ppx  - Incentive spirometry   - ERASTO drain care  - keep abdominal binder on  - f/u plastics recs    Green surgery   [id number]   57y Male w/ PMH of HTN , perforated diverticulitis s/p Dev's 9/2021 and reversal w/ incisional hernia repair 3/2022, now s/p open recurrent incisional hernia repair with mesh and anterior component separation w/ PRS 2/10.    Plan:  - ERP  - advance diet  - pain control  - monitor ERASTO output, drains per PRS  - f/u PRS recs  - keep abd binder in place  - Activity - OOB with assistance  - DVT ppx    Green surgery   [id number]   57y Male w/ PMH of HTN , perforated diverticulitis s/p Dev's 9/2021 and reversal w/ incisional hernia repair 3/2022, now s/p open recurrent incisional hernia repair with mesh and anterior component separation w/ PRS 2/10.    Plan:  - ERP  - advance diet to LRD  - pain control  - monitor ERASTO output, drains per PRS  - f/u PRS recs - patient to f/u outpatient w/ Dr. Bell  - keep abd binder in place  - Activity - OOB with assistance  - DVT ppx  - PT eval pending  - dispo planning    Green surgery   [id number]

## 2023-02-11 NOTE — DISCHARGE NOTE PROVIDER - NSDCCPTREATMENT_GEN_ALL_CORE_FT
PRINCIPAL PROCEDURE  Procedure: Open repair of recurrent incisional hernia of anterior abdominal wall using synthetic mesh and anterior component separation technique  Findings and Treatment: WOUND CARE: Please keep prineo dressing over incision in place. You may cover with guaze for comfort if desired. Please keep abdominal binder in place.  BATHING: Please do not submerge wound underwater. You may shower and/or sponge bathe. Please pat wound dry after showering.    ACTIVITY: No heavy lifting anything more than 10-15lbs or straining. Otherwise, you may return to your usual level of physical activity. If you are taking narcotic pain medication (such as oxycodone or Percocet), do NOT drive a car, operate machinery or make important decisions.  NOTIFY YOUR SURGEON IF: You have any excessive bleeding or pus draining from your wound, any fever (over 100.4 F) or chills, persistent nausea/vomiting with inability to tolerate food or liquids, persistent diarrhea, or if your pain is not controlled on your discharge pain medications.  FOLLOW-UP:  1. Please call to make a follow-up appointment with Dr. Wagner in 2 weeks upon discharge from the hospital - Please call immediately upon discharge to schedule the appointment  2. Please follow up with your primary care physician in one week regarding your hospitalization.  3. Please follow up with plastic surgery, Dr. Bell, in 1 week upon discharge  DRAIN CARE: Rigo Tidwell drain (ERASTO Drain)- If you go home with a ERASTO drain it is important that you measure and record the fluid that is in it (output) on the output record sheet. Please bring the output record sheet to your follow-up appointment. Keep the drain secured to your clothing with a safety pin at all times to avoid accidental displacement. Monitor the insertion site for redness, pain, swelling, or purulent drainage.  You may shower with the drain. Wash around the drain with soap and water, pat dry, and reapply dressing. If you noticed a sudden change in the color or amount of fluid in the drain, please contact your surgeon’s office.  Your drain will be removed at an outpatient follow up appointment.       PRINCIPAL PROCEDURE  Procedure: Open repair of recurrent incisional hernia of anterior abdominal wall using synthetic mesh and anterior component separation technique  Findings and Treatment: WOUND CARE: Please keep abdominal binder in place, can change abdominal pads as needed for comfort.  BATHING: Please do not allow incision to get wet until follow up with Dr. Bell.  ACTIVITY: No heavy lifting anything more than 10-15lbs or straining. Otherwise, you may return to your usual level of physical activity. If you are taking narcotic pain medication (such as oxycodone or Percocet), do NOT drive a car, operate machinery or make important decisions.  NOTIFY YOUR SURGEON IF: You have any excessive bleeding or pus draining from your wound, any fever (over 100.4 F) or chills, persistent nausea/vomiting with inability to tolerate food or liquids, persistent diarrhea, or if your pain is not controlled on your discharge pain medications.  FOLLOW-UP:  1. Please call to make a follow-up appointment with Dr. Wagner in 2 weeks upon discharge from the hospital - Please call immediately upon discharge to schedule the appointment  2. Please follow up with your primary care physician in one week regarding your hospitalization.  3. Please follow up with plastic surgery, Dr. Bell, in 1 week upon discharge  DRAIN CARE: Rigo Tidwell drain (ERASTO Drain)- If you go home with a ERASTO drain it is important that you measure and record the fluid that is in it (output) on the output record sheet. Please bring the output record sheet to your follow-up appointment. Keep the drain secured to your clothing with a safety pin at all times to avoid accidental displacement. Monitor the insertion site for redness, pain, swelling, or purulent drainage.  You may shower with the drain. Wash around the drain with soap and water, pat dry, and reapply dressing. If you noticed a sudden change in the color or amount of fluid in the drain, please contact your surgeon’s office.  Your drain will be removed at an outpatient follow up appointment.

## 2023-02-11 NOTE — PHYSICAL THERAPY INITIAL EVALUATION ADULT - ADDITIONAL COMMENTS
pt lives in private home with family. Prior to admission, pt was I with all functional mobility and ADLs without AD. 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

## 2023-02-11 NOTE — PROGRESS NOTE ADULT - ASSESSMENT
57y Male w/ PMH of HTN , perforated diverticulitis s/p Dev's 9/2021 and reversal w/ incisional hernia repair 3/2022, now s/p open recurrent incisional hernia repair with mesh and anterior component separation w/ PRS 2/10.    -Make sure abdominal binder stays in place  -Monitor drain outputs  -OOB and ambulate  -Care per primary team     Plastic Surgery

## 2023-02-11 NOTE — DISCHARGE NOTE PROVIDER - NSDCCPCAREPLAN_GEN_ALL_CORE_FT
PRINCIPAL DISCHARGE DIAGNOSIS  Diagnosis: Recurrent incisional hernia  Assessment and Plan of Treatment:

## 2023-02-11 NOTE — PROVIDER CONTACT NOTE (OTHER) - BACKGROUND
Pt admitted 2/10 for planned open repair of incisional hernia with mesh and abdominal wall reconstruction.

## 2023-02-12 ENCOUNTER — TRANSCRIPTION ENCOUNTER (OUTPATIENT)
Age: 58
End: 2023-02-12

## 2023-02-12 VITALS
RESPIRATION RATE: 18 BRPM | TEMPERATURE: 98 F | OXYGEN SATURATION: 97 % | HEART RATE: 83 BPM | SYSTOLIC BLOOD PRESSURE: 137 MMHG | DIASTOLIC BLOOD PRESSURE: 78 MMHG

## 2023-02-12 LAB
ANION GAP SERPL CALC-SCNC: 9 MMOL/L — SIGNIFICANT CHANGE UP (ref 5–17)
BUN SERPL-MCNC: 17 MG/DL — SIGNIFICANT CHANGE UP (ref 7–23)
CALCIUM SERPL-MCNC: 9 MG/DL — SIGNIFICANT CHANGE UP (ref 8.4–10.5)
CHLORIDE SERPL-SCNC: 105 MMOL/L — SIGNIFICANT CHANGE UP (ref 96–108)
CO2 SERPL-SCNC: 27 MMOL/L — SIGNIFICANT CHANGE UP (ref 22–31)
CREAT SERPL-MCNC: 0.84 MG/DL — SIGNIFICANT CHANGE UP (ref 0.5–1.3)
EGFR: 101 ML/MIN/1.73M2 — SIGNIFICANT CHANGE UP
GLUCOSE SERPL-MCNC: 105 MG/DL — HIGH (ref 70–99)
HCT VFR BLD CALC: 36.4 % — LOW (ref 39–50)
HGB BLD-MCNC: 11.7 G/DL — LOW (ref 13–17)
MAGNESIUM SERPL-MCNC: 2.3 MG/DL — SIGNIFICANT CHANGE UP (ref 1.6–2.6)
MCHC RBC-ENTMCNC: 31.1 PG — SIGNIFICANT CHANGE UP (ref 27–34)
MCHC RBC-ENTMCNC: 32.1 GM/DL — SIGNIFICANT CHANGE UP (ref 32–36)
MCV RBC AUTO: 96.8 FL — SIGNIFICANT CHANGE UP (ref 80–100)
NRBC # BLD: 0 /100 WBCS — SIGNIFICANT CHANGE UP (ref 0–0)
PHOSPHATE SERPL-MCNC: 2.9 MG/DL — SIGNIFICANT CHANGE UP (ref 2.5–4.5)
PLATELET # BLD AUTO: 171 K/UL — SIGNIFICANT CHANGE UP (ref 150–400)
POTASSIUM SERPL-MCNC: 4.4 MMOL/L — SIGNIFICANT CHANGE UP (ref 3.5–5.3)
POTASSIUM SERPL-SCNC: 4.4 MMOL/L — SIGNIFICANT CHANGE UP (ref 3.5–5.3)
RBC # BLD: 3.76 M/UL — LOW (ref 4.2–5.8)
RBC # FLD: 12.7 % — SIGNIFICANT CHANGE UP (ref 10.3–14.5)
SODIUM SERPL-SCNC: 141 MMOL/L — SIGNIFICANT CHANGE UP (ref 135–145)
WBC # BLD: 6.21 K/UL — SIGNIFICANT CHANGE UP (ref 3.8–10.5)
WBC # FLD AUTO: 6.21 K/UL — SIGNIFICANT CHANGE UP (ref 3.8–10.5)

## 2023-02-12 PROCEDURE — C9399: CPT

## 2023-02-12 PROCEDURE — 83735 ASSAY OF MAGNESIUM: CPT

## 2023-02-12 PROCEDURE — C1889: CPT

## 2023-02-12 PROCEDURE — C1781: CPT

## 2023-02-12 PROCEDURE — 82962 GLUCOSE BLOOD TEST: CPT

## 2023-02-12 PROCEDURE — 84100 ASSAY OF PHOSPHORUS: CPT

## 2023-02-12 PROCEDURE — 97161 PT EVAL LOW COMPLEX 20 MIN: CPT

## 2023-02-12 PROCEDURE — 80048 BASIC METABOLIC PNL TOTAL CA: CPT

## 2023-02-12 PROCEDURE — 88304 TISSUE EXAM BY PATHOLOGIST: CPT

## 2023-02-12 PROCEDURE — 85027 COMPLETE CBC AUTOMATED: CPT

## 2023-02-12 PROCEDURE — 36415 COLL VENOUS BLD VENIPUNCTURE: CPT

## 2023-02-12 RX ORDER — OXYCODONE HYDROCHLORIDE 5 MG/1
1 TABLET ORAL
Qty: 25 | Refills: 0
Start: 2023-02-12

## 2023-02-12 RX ADMIN — Medication 1000 MILLIGRAM(S): at 12:01

## 2023-02-12 RX ADMIN — POLYETHYLENE GLYCOL 3350 17 GRAM(S): 17 POWDER, FOR SOLUTION ORAL at 12:01

## 2023-02-12 RX ADMIN — Medication 600 MILLIGRAM(S): at 01:46

## 2023-02-12 RX ADMIN — LISINOPRIL 10 MILLIGRAM(S): 2.5 TABLET ORAL at 08:02

## 2023-02-12 RX ADMIN — HEPARIN SODIUM 5000 UNIT(S): 5000 INJECTION INTRAVENOUS; SUBCUTANEOUS at 05:33

## 2023-02-12 RX ADMIN — Medication 600 MILLIGRAM(S): at 02:16

## 2023-02-12 RX ADMIN — OXYCODONE HYDROCHLORIDE 10 MILLIGRAM(S): 5 TABLET ORAL at 13:22

## 2023-02-12 RX ADMIN — OXYCODONE HYDROCHLORIDE 10 MILLIGRAM(S): 5 TABLET ORAL at 06:00

## 2023-02-12 RX ADMIN — Medication 600 MILLIGRAM(S): at 08:02

## 2023-02-12 RX ADMIN — OXYCODONE HYDROCHLORIDE 10 MILLIGRAM(S): 5 TABLET ORAL at 05:33

## 2023-02-12 RX ADMIN — OXYCODONE HYDROCHLORIDE 10 MILLIGRAM(S): 5 TABLET ORAL at 09:21

## 2023-02-12 RX ADMIN — AMLODIPINE BESYLATE 5 MILLIGRAM(S): 2.5 TABLET ORAL at 08:02

## 2023-02-12 NOTE — DISCHARGE NOTE NURSING/CASE MANAGEMENT/SOCIAL WORK - PATIENT PORTAL LINK FT
You can access the FollowMyHealth Patient Portal offered by [facility] by registering at the following website: [email or web address]. By joining [facility]’s FollowMyHealth portal, you will also be able to view your health information using other applications (apps) compatible with our system.

## 2023-02-12 NOTE — PROGRESS NOTE ADULT - ATTENDING COMMENTS
I have seen and examined the patient. I agree with the above surgery resident's note.  doing well, d/c home
I have seen and examined the patient. I agree with the above surgery resident's note.

## 2023-02-12 NOTE — PROGRESS NOTE ADULT - ASSESSMENT
57y Male w/ PMH of HTN , perforated diverticulitis s/p Dev's 9/2021 and reversal w/ incisional hernia repair 3/2022, now s/p open recurrent incisional hernia repair with mesh and anterior component separation w/ PRS 2/10.    -Make sure abdominal binder stays in place  - okay to dc from our standpoint  - duricef abx sent to pharmacy, take for 1 week. Follow up with Dr. yadav next week  - do not allow incision to get wet until follow up, change ABD prn for comfort.   -Monitor drain outputs  -OOB and ambulate  -Care per primary team     Plastic Surgery

## 2023-02-12 NOTE — DISCHARGE NOTE NURSING/CASE MANAGEMENT/SOCIAL WORK - NSDCPEFALRISK_GEN_ALL_CORE
For information on Fall & Injury Prevention, visit: [email or web address] OR  [email or web address] OR  https://www.cdc.gov/steadi/patient.html

## 2023-02-12 NOTE — PROGRESS NOTE ADULT - SUBJECTIVE AND OBJECTIVE BOX
`Surgery Progress Note    Overnight Events: No acute events overnight.  SUBJECTIVE: Patient seen and examined at bedside with surgical team, patient without complaints. Denies fever, chills, CP, SOB nausea, vomiting, dizziness.      OBJECTIVE:    Vital Signs Last 24 Hrs  T(C): 36.7 (12 Feb 2023 01:37), Max: 36.7 (12 Feb 2023 01:37)  T(F): 98.1 (12 Feb 2023 01:37), Max: 98.1 (12 Feb 2023 01:37)  HR: 71 (12 Feb 2023 01:37) (65 - 88)  BP: 105/60 (12 Feb 2023 01:37) (100/62 - 123/72)  BP(mean): --  RR: 18 (12 Feb 2023 01:37) (18 - 18)  SpO2: 95% (12 Feb 2023 01:37) (92% - 98%)    Parameters below as of 12 Feb 2023 01:37  Patient On (Oxygen Delivery Method): room air    I&O's Detail    10 Feb 2023 07:01  -  11 Feb 2023 07:00  --------------------------------------------------------  IN:    IV PiggyBack: 200 mL    Lactated Ringers: 1200 mL    Oral Fluid: 400 mL  Total IN: 1800 mL    OUT:    Drain (mL): 55 mL    Drain (mL): 25 mL    Voided (mL): 1000 mL  Total OUT: 1080 mL    Total NET: 720 mL      11 Feb 2023 07:01  -  12 Feb 2023 04:11  --------------------------------------------------------  IN:    Oral Fluid: 200 mL  Total IN: 200 mL    OUT:    Drain (mL): 10 mL    Drain (mL): 10 mL    Voided (mL): 500 mL  Total OUT: 520 mL    Total NET: -320 mL      MEDICATIONS  (STANDING):  acetaminophen     Tablet .. 1000 milliGRAM(s) Oral every 6 hours  amLODIPine   Tablet 5 milliGRAM(s) Oral daily  heparin   Injectable 5000 Unit(s) SubCutaneous every 8 hours  ibuprofen  Tablet. 600 milliGRAM(s) Oral every 6 hours  lisinopril 10 milliGRAM(s) Oral daily  polyethylene glycol 3350 17 Gram(s) Oral daily  senna 2 Tablet(s) Oral at bedtime    MEDICATIONS  (PRN):  diazepam  Injectable 5 milliGRAM(s) IV Push every 6 hours PRN Muscle Spasms  ondansetron Injectable 4 milliGRAM(s) IV Push every 8 hours PRN Nausea and/or Vomiting  oxyCODONE    IR 5 milliGRAM(s) Oral every 4 hours PRN Moderate Pain (4 - 6)  oxyCODONE    IR 10 milliGRAM(s) Oral every 4 hours PRN Severe Pain (7 - 10)      PHYSICAL EXAM:  General: NAD, resting in bed  Respiratory: respirations non labored  Gastrointestinal: soft, nondistended, appropriately tender, dermabond/prineo over midline incision, abd binder in place, 2 JPs with serosanguinous output  Extremities: FROM, warm    LABS:                        11.6   8.03  )-----------( 190      ( 11 Feb 2023 07:09 )             35.3     02-11    134<L>  |  99  |  16  ----------------------------<  136<H>  4.1   |  24  |  0.77    Ca    8.8      11 Feb 2023 07:08  Phos  3.8     02-11  Mg     2.2     02-11              IMAGING: `Surgery Progress Note    Overnight Events: No acute events overnight.  SUBJECTIVE: Patient seen and examined at bedside with surgical team. Patient reports abdominal pain with movement. Denies fever, chills, CP, SOB nausea, vomiting, dizziness. +flatus, no BM yet. Urinary incontinence has improved.      OBJECTIVE:    Vital Signs Last 24 Hrs  T(C): 36.7 (12 Feb 2023 01:37), Max: 36.7 (12 Feb 2023 01:37)  T(F): 98.1 (12 Feb 2023 01:37), Max: 98.1 (12 Feb 2023 01:37)  HR: 71 (12 Feb 2023 01:37) (65 - 88)  BP: 105/60 (12 Feb 2023 01:37) (100/62 - 123/72)  BP(mean): --  RR: 18 (12 Feb 2023 01:37) (18 - 18)  SpO2: 95% (12 Feb 2023 01:37) (92% - 98%)    Parameters below as of 12 Feb 2023 01:37  Patient On (Oxygen Delivery Method): room air    I&O's Detail    10 Feb 2023 07:01  -  11 Feb 2023 07:00  --------------------------------------------------------  IN:    IV PiggyBack: 200 mL    Lactated Ringers: 1200 mL    Oral Fluid: 400 mL  Total IN: 1800 mL    OUT:    Drain (mL): 55 mL    Drain (mL): 25 mL    Voided (mL): 1000 mL  Total OUT: 1080 mL    Total NET: 720 mL      11 Feb 2023 07:01  -  12 Feb 2023 04:11  --------------------------------------------------------  IN:    Oral Fluid: 200 mL  Total IN: 200 mL    OUT:    Drain (mL): 10 mL    Drain (mL): 10 mL    Voided (mL): 500 mL  Total OUT: 520 mL    Total NET: -320 mL      MEDICATIONS  (STANDING):  acetaminophen     Tablet .. 1000 milliGRAM(s) Oral every 6 hours  amLODIPine   Tablet 5 milliGRAM(s) Oral daily  heparin   Injectable 5000 Unit(s) SubCutaneous every 8 hours  ibuprofen  Tablet. 600 milliGRAM(s) Oral every 6 hours  lisinopril 10 milliGRAM(s) Oral daily  polyethylene glycol 3350 17 Gram(s) Oral daily  senna 2 Tablet(s) Oral at bedtime    MEDICATIONS  (PRN):  diazepam  Injectable 5 milliGRAM(s) IV Push every 6 hours PRN Muscle Spasms  ondansetron Injectable 4 milliGRAM(s) IV Push every 8 hours PRN Nausea and/or Vomiting  oxyCODONE    IR 5 milliGRAM(s) Oral every 4 hours PRN Moderate Pain (4 - 6)  oxyCODONE    IR 10 milliGRAM(s) Oral every 4 hours PRN Severe Pain (7 - 10)      PHYSICAL EXAM:  General: NAD, resting in bed  Respiratory: respirations non labored  Gastrointestinal: soft, nondistended, appropriately tender, dermabond/prineo over midline incision, abd binder in place, 2 JPs with serosanguinous output  Extremities: FROM, warm    LABS:                        11.6   8.03  )-----------( 190      ( 11 Feb 2023 07:09 )             35.3     02-11    134<L>  |  99  |  16  ----------------------------<  136<H>  4.1   |  24  |  0.77    Ca    8.8      11 Feb 2023 07:08  Phos  3.8     02-11  Mg     2.2     02-11              IMAGING:

## 2023-02-12 NOTE — PROGRESS NOTE ADULT - ASSESSMENT
57y Male w/ PMH of HTN , perforated diverticulitis s/p Dev's 9/2021 and reversal w/ incisional hernia repair 3/2022, now s/p open recurrent incisional hernia repair with mesh and anterior component separation w/ PRS 2/10.    Plan:  - ERP  - advance diet to LRD  - pain control  - monitor ERASTO output, drains per PRS  - f/u PRS recs - patient to f/u outpatient w/ Dr. Bell  - keep abd binder in place  - Activity - OOB with assistance  - DVT ppx  - PT eval: NPT  - dispo planning    Green surgery   [id number] 57y Male w/ PMH of HTN , perforated diverticulitis s/p Dev's 9/2021 and reversal w/ incisional hernia repair 3/2022, now s/p open recurrent incisional hernia repair with mesh and anterior component separation w/ PRS 2/10.    Plan:  - ERP  - LRD  - pain control  - monitor ERASTO output, drains per PRS  - f/u PRS recs - patient to f/u outpatient w/ Dr. Arabella dc w/ 1wk abx  - keep abd binder in place  - Activity - OOB with assistance  - DVT ppx  - PT eval: NPT  - dispo planning    Green surgery   [id number] 57y Male w/ PMH of HTN , perforated diverticulitis s/p Dev's 9/2021 and reversal w/ incisional hernia repair 3/2022, now s/p open recurrent incisional hernia repair with mesh and anterior component separation w/ PRS 2/10.    Plan:  - ERP  - LRD  - pain control  - monitor ERASTO output, drains per PRS  - f/u PRS recs - patient to f/u outpatient w/ Dr. Arabella dc w/ 1wk abx  - keep abd binder in place  - Activity - OOB with assistance  - DVT ppx  - PT eval: NPT  - plan for discharge home today    Green surgery   [id number]

## 2023-02-12 NOTE — PROGRESS NOTE ADULT - SUBJECTIVE AND OBJECTIVE BOX
Plastic Surgery    SUBJECTIVE: Pt seen and examined on rounds with team. NAEON.      VITALS  T(C): 36.8 (02-12-23 @ 05:24), Max: 36.8 (02-12-23 @ 05:24)  HR: 72 (02-12-23 @ 05:24) (67 - 88)  BP: 109/63 (02-12-23 @ 05:38) (105/60 - 123/72)  RR: 18 (02-12-23 @ 05:24) (18 - 18)  SpO2: 93% (02-12-23 @ 05:24) (93% - 98%)  CAPILLARY BLOOD GLUCOSE          Is/Os    02-11 @ 07:01  -  02-12 @ 07:00  --------------------------------------------------------  IN:    Oral Fluid: 440 mL  Total IN: 440 mL    OUT:    Drain (mL): 20 mL    Drain (mL): 35 mL    Voided (mL): 800 mL  Total OUT: 855 mL    Total NET: -415 mL          Physical Exam:  General Appearance: Appears well, NAD  Respiratory: No labored breathing  CV: Pulse regularly present  Abdomen: Soft, nontender, incision c/d/i, ERASTO drains with SS output     MEDICATIONS (STANDING): acetaminophen     Tablet .. 1000 milliGRAM(s) Oral every 6 hours  amLODIPine   Tablet 5 milliGRAM(s) Oral daily  heparin   Injectable 5000 Unit(s) SubCutaneous every 8 hours  ibuprofen  Tablet. 600 milliGRAM(s) Oral every 6 hours  lisinopril 10 milliGRAM(s) Oral daily  polyethylene glycol 3350 17 Gram(s) Oral daily  senna 2 Tablet(s) Oral at bedtime    MEDICATIONS (PRN):diazepam  Injectable 5 milliGRAM(s) IV Push every 6 hours PRN Muscle Spasms  ondansetron Injectable 4 milliGRAM(s) IV Push every 8 hours PRN Nausea and/or Vomiting  oxyCODONE    IR 5 milliGRAM(s) Oral every 4 hours PRN Moderate Pain (4 - 6)  oxyCODONE    IR 10 milliGRAM(s) Oral every 4 hours PRN Severe Pain (7 - 10)      LABS  CBC (02-12 @ 07:15)                              11.7<L>                         6.21    )----------------(  171        --    % Neutrophils, --    % Lymphocytes, ANC: --                                  36.4<L>  CBC (02-11 @ 07:09)                              11.6<L>                         8.03    )----------------(  190        --    % Neutrophils, --    % Lymphocytes, ANC: --                                  35.3<L>    BMP (02-12 @ 07:16)             141     |  105     |  17    		Ca++ --      Ca 9.0                ---------------------------------( 105<H>		Mg 2.3                4.4     |  27      |  0.84  			Ph 2.9     BMP (02-11 @ 07:08)             134<L>  |  99      |  16    		Ca++ --      Ca 8.8                ---------------------------------( 136<H>		Mg 2.2                4.1     |  24      |  0.77  			Ph 3.8                   IMAGING STUDIES

## 2023-02-14 ENCOUNTER — NON-APPOINTMENT (OUTPATIENT)
Age: 58
End: 2023-02-14

## 2023-02-14 PROBLEM — R73.03 PREDIABETES: Chronic | Status: ACTIVE | Noted: 2023-01-27

## 2023-02-14 PROBLEM — U07.1 COVID-19: Chronic | Status: ACTIVE | Noted: 2023-01-27

## 2023-02-16 ENCOUNTER — APPOINTMENT (OUTPATIENT)
Dept: PLASTIC SURGERY | Facility: CLINIC | Age: 58
End: 2023-02-16
Payer: COMMERCIAL

## 2023-02-16 VITALS
OXYGEN SATURATION: 96 % | WEIGHT: 219 LBS | DIASTOLIC BLOOD PRESSURE: 79 MMHG | HEART RATE: 76 BPM | BODY MASS INDEX: 30.66 KG/M2 | TEMPERATURE: 97.5 F | HEIGHT: 71 IN | SYSTOLIC BLOOD PRESSURE: 121 MMHG

## 2023-02-16 PROCEDURE — 99212 OFFICE O/P EST SF 10 MIN: CPT

## 2023-02-23 ENCOUNTER — APPOINTMENT (OUTPATIENT)
Dept: PLASTIC SURGERY | Facility: CLINIC | Age: 58
End: 2023-02-23
Payer: COMMERCIAL

## 2023-02-23 ENCOUNTER — APPOINTMENT (OUTPATIENT)
Dept: SURGERY | Facility: CLINIC | Age: 58
End: 2023-02-23
Payer: COMMERCIAL

## 2023-02-23 VITALS
SYSTOLIC BLOOD PRESSURE: 138 MMHG | OXYGEN SATURATION: 97 % | WEIGHT: 202 LBS | TEMPERATURE: 97.7 F | HEART RATE: 73 BPM | HEIGHT: 71 IN | BODY MASS INDEX: 28.28 KG/M2 | DIASTOLIC BLOOD PRESSURE: 83 MMHG

## 2023-02-23 VITALS
DIASTOLIC BLOOD PRESSURE: 81 MMHG | BODY MASS INDEX: 30.66 KG/M2 | HEIGHT: 71 IN | WEIGHT: 219 LBS | SYSTOLIC BLOOD PRESSURE: 132 MMHG | RESPIRATION RATE: 17 BRPM | TEMPERATURE: 97.6 F | HEART RATE: 67 BPM | OXYGEN SATURATION: 98 %

## 2023-02-23 DIAGNOSIS — K43.2 INCISIONAL HERNIA W/OUT OBSTRUCTION OR GANGRENE: ICD-10-CM

## 2023-02-23 PROCEDURE — 99024 POSTOP FOLLOW-UP VISIT: CPT

## 2023-02-23 PROCEDURE — 99213 OFFICE O/P EST LOW 20 MIN: CPT

## 2023-02-23 NOTE — ASSESSMENT
[FreeTextEntry1] : The patient is to be seen by Dr. Bell (plastic surgeon) later today.  I have told the patient that I do not want him lifting anything over 30 or 40 pounds for the next month.  He is to see me in 1 month.

## 2023-02-23 NOTE — HISTORY OF PRESENT ILLNESS
[de-identified] : ANG is a 57 year old male here for s/p Open repair of recurrent incisional hernia of anterior abdominal wall using synthetic mesh and anterior component separation technique on 02/10/23.  The recurrent incisional hernia repair is intact.  The surgical wound is clean and dry

## 2023-02-23 NOTE — PHYSICAL EXAM
[de-identified] : The recurrent incisional hernia repair is intact.  The surgical Raymond drain is serosanguineous.  The surgical wound is clean and dry.

## 2023-03-10 ENCOUNTER — NON-APPOINTMENT (OUTPATIENT)
Age: 58
End: 2023-03-10

## 2023-03-16 ENCOUNTER — APPOINTMENT (OUTPATIENT)
Dept: PLASTIC SURGERY | Facility: CLINIC | Age: 58
End: 2023-03-16

## 2023-03-23 ENCOUNTER — APPOINTMENT (OUTPATIENT)
Dept: SURGERY | Facility: CLINIC | Age: 58
End: 2023-03-23
Payer: COMMERCIAL

## 2023-03-23 ENCOUNTER — APPOINTMENT (OUTPATIENT)
Dept: PLASTIC SURGERY | Facility: CLINIC | Age: 58
End: 2023-03-23
Payer: COMMERCIAL

## 2023-03-23 VITALS
HEART RATE: 90 BPM | SYSTOLIC BLOOD PRESSURE: 116 MMHG | BODY MASS INDEX: 28.14 KG/M2 | OXYGEN SATURATION: 97 % | WEIGHT: 201 LBS | TEMPERATURE: 98.5 F | HEIGHT: 71 IN | DIASTOLIC BLOOD PRESSURE: 77 MMHG

## 2023-03-23 VITALS
OXYGEN SATURATION: 100 % | RESPIRATION RATE: 17 BRPM | DIASTOLIC BLOOD PRESSURE: 82 MMHG | SYSTOLIC BLOOD PRESSURE: 128 MMHG | HEART RATE: 73 BPM | TEMPERATURE: 97.1 F

## 2023-03-23 DIAGNOSIS — Z98.890 OTHER SPECIFIED POSTPROCEDURAL STATES: ICD-10-CM

## 2023-03-23 DIAGNOSIS — K43.2 INCISIONAL HERNIA W/OUT OBSTRUCTION OR GANGRENE: ICD-10-CM

## 2023-03-23 PROCEDURE — 99213 OFFICE O/P EST LOW 20 MIN: CPT

## 2023-03-23 PROCEDURE — 99024 POSTOP FOLLOW-UP VISIT: CPT

## 2023-03-23 RX ORDER — OXYCODONE AND ACETAMINOPHEN 5; 325 MG/1; MG/1
5-325 TABLET ORAL
Qty: 24 | Refills: 0 | Status: DISCONTINUED | COMMUNITY
Start: 2022-10-27 | End: 2023-03-23

## 2023-03-23 RX ORDER — OXYCODONE AND ACETAMINOPHEN 5; 325 MG/1; MG/1
5-325 TABLET ORAL
Qty: 24 | Refills: 0 | Status: DISCONTINUED | COMMUNITY
Start: 2022-12-05 | End: 2023-03-23

## 2023-03-23 RX ORDER — OXYCODONE AND ACETAMINOPHEN 5; 325 MG/1; MG/1
5-325 TABLET ORAL
Qty: 15 | Refills: 0 | Status: DISCONTINUED | COMMUNITY
Start: 2023-01-18 | End: 2023-03-23

## 2023-03-23 RX ORDER — OXYCODONE AND ACETAMINOPHEN 5; 325 MG/1; MG/1
5-325 TABLET ORAL
Qty: 24 | Refills: 0 | Status: DISCONTINUED | COMMUNITY
Start: 2022-11-10 | End: 2023-03-23

## 2023-03-23 RX ORDER — OXYCODONE 5 MG/1
5 TABLET ORAL
Qty: 12 | Refills: 0 | Status: DISCONTINUED | COMMUNITY
Start: 2023-02-14 | End: 2023-03-23

## 2023-03-23 RX ORDER — DIAZEPAM 5 MG/1
5 TABLET ORAL
Qty: 10 | Refills: 0 | Status: DISCONTINUED | COMMUNITY
Start: 2023-02-14 | End: 2023-03-23

## 2023-03-23 RX ORDER — OXYCODONE AND ACETAMINOPHEN 5; 325 MG/1; MG/1
5-325 TABLET ORAL
Qty: 24 | Refills: 0 | Status: DISCONTINUED | COMMUNITY
Start: 2023-01-31 | End: 2023-03-23

## 2023-03-23 RX ORDER — OXYCODONE AND ACETAMINOPHEN 5; 325 MG/1; MG/1
5-325 TABLET ORAL
Qty: 16 | Refills: 0 | Status: DISCONTINUED | COMMUNITY
Start: 2023-02-23 | End: 2023-03-23

## 2023-03-23 RX ORDER — OXYCODONE AND ACETAMINOPHEN 5; 325 MG/1; MG/1
5-325 TABLET ORAL
Qty: 24 | Refills: 0 | Status: DISCONTINUED | COMMUNITY
Start: 2022-12-20 | End: 2023-03-23

## 2023-03-23 NOTE — HISTORY OF PRESENT ILLNESS
[de-identified] : Ramesh is a 56 y/o male s/p open repair of recurrent incisional hernia of anterior abdominal wall using synthetic mesh and anterior component separation technique on 02/10/23 \par \par Pt was last seen 2/23/23-  The recurrent incisional hernia repair is intact. The surgical Raymond drain is serosanguineous. The surgical wound is clean and dry. \par Pt was advised not to life anything greater than 30-40 lbs. \par \par Today pt reports 4/10 pain - pain gets worse at the end of the day. Pain managed with ibuprofen and Percocet. Denies drainage, bleeding, swelling, and redness of surgical incision. Denies nausea and vomiting. Denies fever and chills. Normal BM, denies constipation or diarrhea. Good appetite, normal diet. Not taking anticoagulants. \par \par

## 2023-03-23 NOTE — ASSESSMENT
Please notify patient that recent lab work shows the followin. Her blood counts are normal, her kidney and liver function testing is normal, her blood sugar is normal, her thyroid testing is normal, and her hepatitis C screening is negative2. Her lipid panel shows mildly elevated total cholesterol level and mildly elevated bad cholesterol level. She should make her best efforts to eat a lower carbohydrate and lower saturated fat diet. She should make her best efforts to get 150 minutes/week of moderate intensity exercise.  We will continue to follow her lab work over time [FreeTextEntry1] : I have discussed with the patient that we need to get him off his use of Percocet.  I have suggested he see a pain management specialist.  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

## 2023-04-03 PROBLEM — K43.2 RECURRENT INCISIONAL HERNIA: Status: ACTIVE | Noted: 2022-11-10

## 2023-04-03 PROBLEM — Z98.890 POSTOPERATIVE STATE: Status: ACTIVE | Noted: 2022-04-05

## 2023-06-22 ENCOUNTER — APPOINTMENT (OUTPATIENT)
Dept: PLASTIC SURGERY | Facility: CLINIC | Age: 58
End: 2023-06-22

## 2023-08-09 NOTE — PRE-ANESTHESIA EVALUATION ADULT - NSATTENDATTESTRD_GEN_ALL_CORE
----- Message from Deion Maynard MD sent at 8/9/2023 11:40 AM CDT -----  Please notify the patient of normal results.  Follow-up as planned.  
Pt informed of dr directives, verbalized understanding   Faxed pre op clearance paperwork  
The patient has been re-examined and I agree with the above assessment or I updated with my findings.

## 2023-10-19 ENCOUNTER — APPOINTMENT (OUTPATIENT)
Dept: PLASTIC SURGERY | Facility: CLINIC | Age: 58
End: 2023-10-19
Payer: COMMERCIAL

## 2023-10-19 VITALS
TEMPERATURE: 97.9 F | WEIGHT: 210 LBS | HEIGHT: 61 IN | OXYGEN SATURATION: 97 % | BODY MASS INDEX: 39.65 KG/M2 | DIASTOLIC BLOOD PRESSURE: 87 MMHG | HEART RATE: 73 BPM | SYSTOLIC BLOOD PRESSURE: 131 MMHG

## 2023-10-19 PROCEDURE — XXXXX: CPT | Mod: 1L

## 2023-10-19 RX ORDER — OXYCODONE AND ACETAMINOPHEN 5; 325 MG/1; MG/1
5-325 TABLET ORAL
Qty: 14 | Refills: 0 | Status: DISCONTINUED | COMMUNITY
Start: 2023-03-10 | End: 2023-10-19

## 2023-10-19 RX ORDER — IBUPROFEN 600 MG/1
600 TABLET, FILM COATED ORAL 3 TIMES DAILY
Qty: 90 | Refills: 0 | Status: DISCONTINUED | COMMUNITY
Start: 2023-02-16 | End: 2023-10-19

## 2023-10-19 RX ORDER — OXYCODONE AND ACETAMINOPHEN 5; 325 MG/1; MG/1
5-325 TABLET ORAL
Qty: 6 | Refills: 0 | Status: DISCONTINUED | COMMUNITY
Start: 2023-03-23 | End: 2023-10-19

## 2023-10-19 RX ORDER — IBUPROFEN 600 MG/1
600 TABLET ORAL
Qty: 3 | Refills: 0 | Status: DISCONTINUED | COMMUNITY
Start: 2023-02-14 | End: 2023-10-19

## 2023-10-19 RX ORDER — ACETAMINOPHEN 500 MG/1
500 TABLET ORAL
Qty: 40 | Refills: 0 | Status: DISCONTINUED | COMMUNITY
Start: 2023-02-14 | End: 2023-10-19

## 2023-10-26 NOTE — H&P PST ADULT - ASSESSMENT
MP2 , denies any loose teeth  DASI score 7  CAPRINI SCORE [CLOT updated 18]    AGE RELATED RISK FACTORS                                                       MOBILITY RELATED FACTORS  [ x] Age 41-60 years                                            (1 Point)                    [ ] Bed rest                                                        (1 Point)  [ ] Age: 61-74 years                                           (2 Points)                  [ ] Plaster cast                                                   (2 Points)  [ ] Age= 75 years                                              (3 Points)                    [ ] Bed bound for more than 72 hours                 (2 Points)    DISEASE RELATED RISK FACTORS                                               GENDER SPECIFIC FACTORS  [ ] Edema in the lower extremities                       (1 Point)              [ ] Pregnancy                                                     (1 Point)  [ ] Varicose veins                                               (1 Point)                     [ ] Post-partum < 6 weeks                                   (1 Point)             [x ] BMI > 25 Kg/m2                                            (1 Point)                     [ ] Hormonal therapy  or oral contraception          (1 Point)                 [ ] Sepsis (in the previous month)                        (1 Point)               [ ] History of pregnancy complications                 (1 point)  [ ] Pneumonia or serious lung disease                                               [ ] Unexplained or recurrent                     (1 Point)           (in the previous month)                               (1 Point)  [ ] Abnormal pulmonary function test                     (1 Point)                 SURGERY RELATED RISK FACTORS  [ ] Acute myocardial infarction                              (1 Point)               [ ]  Section                                             (1 Point)  [ ] Congestive heart failure (in the previous month)  (1 Point)      [ ] Minor surgery                                                  (1 Point)   [ ] Inflammatory bowel disease                             (1 Point)               [ ] Arthroscopic surgery                                        (2 Points)  [ ] Central venous access                                      (2 Points)                [x ] General surgery lasting more than 45 minutes (2 points)  [ ] Present or previous malignancy                     (2 Points)                [ ] Elective arthroplasty                                         (5 points)    [ ] Stroke (in the previous month)                          (5 Points)                                                                                                                                                           HEMATOLOGY RELATED FACTORS                                                 TRAUMA RELATED RISK FACTORS  [ ] Prior episodes of VTE                                     (3 Points)                [ ] Fracture of the hip, pelvis, or leg                       (5 Points)  [ ] Positive family history for VTE                         (3 Points)             [ ] Acute spinal cord injury (in the previous month)  (5 Points)  [ ] Prothrombin 92336 A                                     (3 Points)               [ ] Paralysis  (less than 1 month)                             (5 Points)  [ ] Factor V Leiden                                             (3 Points)                  [ ] Multiple Trauma within 1 month                        (5 Points)  [ ] Lupus anticoagulants                                     (3 Points)                                                           [ ] Anticardiolipin antibodies                               (3 Points)                                                       [ ] High homocysteine in the blood                      (3 Points)                                             [ ] Other congenital or acquired thrombophilia      (3 Points)                                                [ ] Heparin induced thrombocytopenia                  (3 Points)                                     Total Score [   4       ] Physical Therapy    Visit Type: treatment  SUBJECTIVE  Patient agreed to participate in therapy this date.  \" I feel ok in the chair\"   Patient / Family Goal: return home and return to previous functional status    Pain   Patient reports pain is not an issue/concern.    At onset of session (out of 10): 0     OBJECTIVE     Cognitive Status   Level of Consciousness   - alert  Arousal Alertness   - delayed responses to stimuli  Affect/Behavior    - calm, cooperative and pleasant  Orientation    - Oriented to: person, place, time and situation  Functional Communication   - Overall Status: impaired   - Forms of Communication: delayed responses, slurred and verbal  Attention Span    - Attention: appears intact  Following Direction   - follows one step commands with increased time    Patient Activity Tolerance: 1 to 1 activity to rest         Bed Mobility  - Rolling left: minimal assist  - Rolling right: minimal assist  - Supine to sit: moderate assist  Assist with bilateral lower extremities and  Trunk to get to edge of bed . Patient using bed rail for assist.   Transfers  Assistive devices: gait belt, hand hold, 1 person (with bed rail for assist. )  - Sit to stand: minimal assist  - Stand to sit: minimal assist  - Stand pivot: moderate assist  Transfer with one person assist with the right  leg blocked to assist with stand pivot transfer      Interventions     Supine    Lower Extremity: Bilateral: SLR, ankle pumps and heel slides, AROM, 10 reps, 1 sets  Seated    Lower Extremity: Bilateral: seated hip flexion, knee flexion, hip abduction and knee extensions, AROM, 10 reps, 1 sets  Neuromuscular Re-Education: Patient working on sitting balance which consisted of weight shift, reaching task and sitting tolerance. Patient progressed to standing balance task with side steps and mini squats with assist for therapist on the left side.   Training provided: activity tolerance, neuromuscular reeducation, transfer training, bed  mobility training and balance retraining    Skilled input: Verbal instruction/cues, tactile instruction/cues and facilitation  Verbal Consent: Writer verbally educated and received verbal consent for hand placement, positioning of patient, and techniques to be performed today from patient for therapist position for techniques and hand placement and palpation for techniques as described above and how they are pertinent to the patient's plan of care.         Education:   - Present and ready to learn: patient  Education provided during session:  - Results of above outlined education: Verbalizes understanding and Needs reinforcement    ASSESSMENT   Progress: progressing toward goals  Interferring components: decreased activity tolerance    Discharge needs based on today's assessment:   - Current level of function: significantly below baseline level of function   - Therapy needs at discharge: intensive daily therapy   - Activities of daily living (ADLs) requiring support at discharge: bed mobility, transfers, ambulation and stairs   - Impairments that require further therapy intervention: activity tolerance, balance, proprioception, strength, ROM and coordination    This session able to participate in all the exercises and transfer. Patient required assist with right lower extremity. Patient is motivated to  participate in all task however is quick to fatigue. Remains up in chair with no apparent distress.       AM-PAC  - Generalized Prior Level of Function: Needs a little help ([facility] 12-21)       Key: MOD A=moderate assistance, IND/MOD I=independent/modified independent  - Generalized Current Level of Function     - Current Mobility Score: 14       AM-PAC Scoring Key= >21 Modified Independent; 20-21 Supervision; 18-19 Minimal assist; 13-17 Moderate assist; 9-12 Max assist; <9 Total assist      Pain at End of Session:   Pain: 0/10    PLAN (while hospitalized)  Suggestions for next session as indicated: Work on standing  balance in preparation for gait training.   PT Frequency: 6-7 x per week      PT/OT Mobility Equipment for Discharge: TBD  PT/OT ADL Equipment for Discharge: Tub Transfer Bench; Grab Bars (Tub/shower)  Agreement to plan and goals: patient agrees with goals and treatment plan        GOALS  Long Term Goals: (to be met by time of discharge from hospital)  Sit to supine: Patient will complete sit to supine modified independent.  Status: progressing/ongoing  Supine to sit: Patient will complete supine to sit modified independent.  Status: progressing/ongoing  Sit (edge of bed): Patient will sit at edge of bed for contact guard or touching/steadying.  Status: progressing/ongoing  Sit to stand: Patient will complete sit to stand transfer with gait belt and least restrictive device, minimal assist.   Status: progressing/ongoing    Documented in the chart in the following areas: Assessment/Plan.      Patient at End of Session:   Location: in chair  Safety measures: call light within reach, alarm system in place/re-engaged and bed rails x2  Handoff to: nurse      Therapy procedure time and total treatment time can be found documented on the Time Entry flowsheet

## 2023-12-14 ENCOUNTER — APPOINTMENT (OUTPATIENT)
Dept: PLASTIC SURGERY | Facility: CLINIC | Age: 58
End: 2023-12-14

## (undated) DEVICE — MEDICATION LABELS W MARKER

## (undated) DEVICE — DRAIN RESERVOIR FOR JACKSON PRATT 100CC CARDINAL

## (undated) DEVICE — NDL HYPO REGULAR BEVEL 22G X 1.5" (TURQUOISE)

## (undated) DEVICE — DRSG OPSITE 13.75 X 4"

## (undated) DEVICE — SOL IRR POUR NS 0.9% 500ML

## (undated) DEVICE — VENODYNE/SCD SLEEVE CALF MEDIUM

## (undated) DEVICE — STAPLER SKIN VISI-STAT 35 WIDE

## (undated) DEVICE — LAP PAD 18 X 18"

## (undated) DEVICE — ABDOMINAL BINDER XL 9" X 62"-74"

## (undated) DEVICE — SUT PDS II 1 48" TP-1

## (undated) DEVICE — DRAIN CHANNEL 19FR ROUND FULL FLUTED

## (undated) DEVICE — TAPE UMBILICAL 1/8 X 30" STRANDS

## (undated) DEVICE — GLV 8.5 PROTEXIS (WHITE)

## (undated) DEVICE — VENODYNE/SCD SLEEVE CALF LARGE

## (undated) DEVICE — DRAPE 3/4 SHEET W REINFORCEMENT 56X77"

## (undated) DEVICE — DRSG COMBINE 5X9"

## (undated) DEVICE — DRAPE TOWEL BLUE 17" X 24"

## (undated) DEVICE — DRSG CURITY GAUZE SPONGE 4 X 4" 12-PLY

## (undated) DEVICE — SUT CHROMIC 3-0 30" V-20

## (undated) DEVICE — DRAPE LIGHT HANDLE COVER (BLUE)

## (undated) DEVICE — VISITEC 4X4

## (undated) DEVICE — DRAPE INSTRUMENT POUCH 6.75" X 11"

## (undated) DEVICE — SOL IRR POUR H2O 250ML

## (undated) DEVICE — DRAPE MAYO STAND 30"

## (undated) DEVICE — STAPLER COVIDIEN ENDO GIA SHORT HANDLE

## (undated) DEVICE — SUT MAXON 1 36" GS-24

## (undated) DEVICE — GOWN TRIMAX LG

## (undated) DEVICE — FOLEY TRAY 16FR 5CC LTX UMETER CLOSED

## (undated) DEVICE — ACMI SELF-SEALING SEAL UP TO 7FR

## (undated) DEVICE — DRSG VAC GRANUFOAM LARGE (BLACK)

## (undated) DEVICE — SOL IRR BAG H2O 3000ML

## (undated) DEVICE — FOLEY CATH 2-WAY 16FR 5CC LATEX COUDE RED

## (undated) DEVICE — POSITIONER FOAM EGG CRATE ULNAR 2PCS (PINK)

## (undated) DEVICE — SYR ASEPTO

## (undated) DEVICE — GLV 7 PROTEXIS (WHITE)

## (undated) DEVICE — GLV 8 PROTEXIS (WHITE)

## (undated) DEVICE — PREP CHLORAPREP HI-LITE ORANGE 26ML

## (undated) DEVICE — PACK MAJOR ABDOMINAL SUPINE

## (undated) DEVICE — SUT MONOSOF 2-0 18" C-15

## (undated) DEVICE — DRSG STERISTRIPS 0.5 X 4"

## (undated) DEVICE — ADAPTER URETHRAL CATH CONNECTING

## (undated) DEVICE — PACK BASIN SPECIAL PROCEDURE

## (undated) DEVICE — LIGASURE IMPACT

## (undated) DEVICE — LONE STAR ELASTIC STAY HOOK 5MM SHARP

## (undated) DEVICE — MARKING PEN W RULER

## (undated) DEVICE — ABDOMINAL BINDER MED/LG 12" X 45"-62"

## (undated) DEVICE — NDL HYPO REGULAR BEVEL 25G X 1.5" (BLUE)

## (undated) DEVICE — SPECIMEN CONTAINER 100ML

## (undated) DEVICE — DRSG VAC WHITEFOAM LARGE (WHITE)

## (undated) DEVICE — SUT MONOCRYL 3-0 18" PS-2 UNDYED

## (undated) DEVICE — DRAIN JACKSON PRATT 7MM FLAT FULL NO TROCAR

## (undated) DEVICE — Device

## (undated) DEVICE — CATH NG SALEM SUMP 16FR

## (undated) DEVICE — DRAPE 1/2 SHEET 40X57"

## (undated) DEVICE — SYR LUER LOK 10CC

## (undated) DEVICE — BLADE SCALPEL SAFETYLOCK #10

## (undated) DEVICE — GLV 7.5 PROTEXIS (WHITE)

## (undated) DEVICE — SUT POLYSORB 3-0 30" V-20 UNDYED

## (undated) DEVICE — TUBING SUCTION 20FT

## (undated) DEVICE — LONE STAR RETRACTOR SQUARE 14.1CMX14.1CM DISP

## (undated) DEVICE — DRAIN JACKSON PRATT 10MM FLAT FULL NO TROCAR

## (undated) DEVICE — DRSG TELFA 3 X 8

## (undated) DEVICE — BLADE SCALPEL SAFETYLOCK #15

## (undated) DEVICE — WARMING BLANKET UPPER ADULT

## (undated) DEVICE — ELCTR BOVIE PENCIL HANDPIECE

## (undated) DEVICE — GLV 6.5 PROTEXIS (WHITE)

## (undated) DEVICE — SUT CHROMIC 0 36" GS-21

## (undated) DEVICE — SUCTION YANKAUER NO CONTROL VENT

## (undated) DEVICE — PACK CYSTO

## (undated) DEVICE — WARMING BLANKET FULL ADULT

## (undated) DEVICE — SUT CHROMIC 1 30" GS-21

## (undated) DEVICE — TUBING RANGER FLUID IRRIGATION SET DISP

## (undated) DEVICE — DRSG TEGADERM 6"X8"